# Patient Record
Sex: FEMALE | Race: WHITE | Employment: OTHER | URBAN - METROPOLITAN AREA
[De-identification: names, ages, dates, MRNs, and addresses within clinical notes are randomized per-mention and may not be internally consistent; named-entity substitution may affect disease eponyms.]

---

## 2017-03-03 ENCOUNTER — TELEPHONE (OUTPATIENT)
Dept: NEUROLOGY | Facility: CLINIC | Age: 62
End: 2017-03-03

## 2017-03-03 NOTE — TELEPHONE ENCOUNTER
Attending physician's statement and pertinent clinic visit notes faxed to [employer] at [phone].

## 2017-03-12 ENCOUNTER — MEDICAL CORRESPONDENCE (OUTPATIENT)
Dept: HEALTH INFORMATION MANAGEMENT | Facility: CLINIC | Age: 62
End: 2017-03-12

## 2017-03-27 ASSESSMENT — ENCOUNTER SYMPTOMS
BACK PAIN: 1
WEIGHT GAIN: 0
STIFFNESS: 1
DECREASED CONCENTRATION: 1
DOUBLE VISION: 0
FEVER: 0
SORE THROAT: 0
FLANK PAIN: 0
LOSS OF CONSCIOUSNESS: 0
TREMORS: 1
SEIZURES: 0
TASTE DISTURBANCE: 0
DISTURBANCES IN COORDINATION: 1
CHILLS: 0
COUGH DISTURBING SLEEP: 0
WEAKNESS: 1
RESPIRATORY PAIN: 0
SMELL DISTURBANCE: 0
SINUS PAIN: 0
NECK PAIN: 1
BRUISES/BLEEDS EASILY: 1
SPUTUM PRODUCTION: 0
DECREASED APPETITE: 0
MYALGIAS: 1
INSOMNIA: 1
DYSPNEA ON EXERTION: 1
POLYPHAGIA: 0
MUSCLE CRAMPS: 0
HEMOPTYSIS: 0
SWOLLEN GLANDS: 0
NERVOUS/ANXIOUS: 1
FATIGUE: 1
HEADACHES: 0
NIGHT SWEATS: 1
SINUS CONGESTION: 0
EYE IRRITATION: 1
SHORTNESS OF BREATH: 1
SPEECH CHANGE: 1
WEIGHT LOSS: 0
POSTURAL DYSPNEA: 1
DEPRESSION: 1
MEMORY LOSS: 1
POLYDIPSIA: 0
JOINT SWELLING: 0
EYE PAIN: 0
HEMATURIA: 0
ARTHRALGIAS: 1
TINGLING: 0
DIFFICULTY URINATING: 0
WHEEZING: 0
DIZZINESS: 1
NECK MASS: 0
NUMBNESS: 1
DYSURIA: 0
MUSCLE WEAKNESS: 1
INCREASED ENERGY: 0
HOARSE VOICE: 1
ALTERED TEMPERATURE REGULATION: 1
EYE WATERING: 0
SNORES LOUDLY: 0
PARALYSIS: 0
EYE REDNESS: 0
TROUBLE SWALLOWING: 0
COUGH: 0
PANIC: 0
HALLUCINATIONS: 0

## 2017-04-03 ENCOUNTER — OFFICE VISIT (OUTPATIENT)
Dept: NEUROLOGY | Facility: CLINIC | Age: 62
End: 2017-04-03

## 2017-04-03 VITALS — DIASTOLIC BLOOD PRESSURE: 89 MMHG | HEIGHT: 65 IN | SYSTOLIC BLOOD PRESSURE: 147 MMHG | HEART RATE: 79 BPM

## 2017-04-03 DIAGNOSIS — R25.3 FASCICULATION: ICD-10-CM

## 2017-04-03 DIAGNOSIS — F41.9 ANXIETY: ICD-10-CM

## 2017-04-03 DIAGNOSIS — G70.00 MYASTHENIA GRAVIS WITHOUT EXACERBATION (H): Primary | ICD-10-CM

## 2017-04-03 DIAGNOSIS — G70.00 MYASTHENIA GRAVIS WITHOUT EXACERBATION (H): ICD-10-CM

## 2017-04-03 LAB
EXPTIME-PRE: 6.14 SEC
FEF2575-%PRED-PRE: 91 %
FEF2575-PRE: 1.96 L/SEC
FEF2575-PRED: 2.13 L/SEC
FEFMAX-%PRED-PRE: 87 %
FEFMAX-PRE: 5.38 L/SEC
FEFMAX-PRED: 6.12 L/SEC
FEV1-%PRED-PRE: 67 %
FEV1-PRE: 1.63 L
FEV1FEV6-PRE: 88 %
FEV1FEV6-PRED: 80 %
FEV1FVC-PRE: 88 %
FEV1FVC-PRED: 79 %
FIFMAX-PRE: 3.21 L/SEC
FVC-%PRED-PRE: 59 %
FVC-PRE: 1.84 L
FVC-PRED: 3.08 L
MEP-PRE: 90 CMH2O
MIP-PRE: -80 CMH2O

## 2017-04-03 RX ORDER — CLONAZEPAM 1 MG/1
TABLET ORAL
Qty: 60 TABLET | Refills: 3 | Status: SHIPPED | OUTPATIENT
Start: 2017-04-03 | End: 2017-08-10

## 2017-04-03 RX ORDER — PREDNISONE 5 MG/1
15 TABLET ORAL DAILY
Qty: 90 TABLET | Refills: 3 | Status: SHIPPED | OUTPATIENT
Start: 2017-04-03 | End: 2017-09-18

## 2017-04-03 RX ORDER — CITALOPRAM HYDROBROMIDE 20 MG/1
20 TABLET ORAL DAILY
Qty: 30 TABLET | Refills: 5 | Status: SHIPPED | OUTPATIENT
Start: 2017-04-03 | End: 2018-02-10

## 2017-04-03 RX ORDER — CARBAMAZEPINE 200 MG/1
200 CAPSULE, EXTENDED RELEASE ORAL 2 TIMES DAILY
Qty: 120 CAPSULE | Refills: 5 | Status: SHIPPED | OUTPATIENT
Start: 2017-04-03 | End: 2018-07-15

## 2017-04-03 ASSESSMENT — PAIN SCALES - GENERAL: PAINLEVEL: NO PAIN (0)

## 2017-04-03 NOTE — PATIENT INSTRUCTIONS
If you have questions or concerns following today's appointment, please contact   Kendra Beckwith at [phone].    For more urgent concerns, contact the neurology department triage line at [phone] option 3.    Follow up with Dr. Vitale in 6 months.     We now have a  available to help patients with psychosocial needs, supportive counseling, advanced care planning, and insurance and/or disability questions. You can reach SEAN Zhang, [facility] at [phone].

## 2017-04-03 NOTE — LETTER
4/3/2017       RE: Martina Kim  [address] [facility] [address]     Dear Colleague,    Thank you for referring your patient, Martina Kim, to the [facility] NEUROLOGY at [facility]. Please see a copy of my visit note below.    April 3, 2017      Ivan Marie MD   [address]   [place] [address]      RE: Martina Kim   MRN: [id number]    : 1955        Dear Dr. Marie:      I had the pleasure of seeing your patient, Martina Kim at the [facility] on 2017.  Since her previous visit in November, she has done fairly well with no worsening of her symptoms.  In fact, she is feeling better and reports fairly good quality of life.  She is more active and able to walk independently for some distance.  She does take breaks and naps when she requires.  She denies any bulbar symptoms, significant double vision or respiratory difficulties.  She continues to be on prednisone which was reduced down to 15 mg daily.  She continues on Hizentra weekly.  Her last infusion of intravenous immunoglobulin was a few weeks ago.  She continues to have some fasciculations for which she is on carbamazepine.  She also reports on some vibration sensation in her thighs which last for a few seconds.  Her anxiety is improved on Celexa.      REVIEW OF SYSTEMS:  The reminder of 10-point review of systems was negative except as mentioned above.      CURRENT MEDICATIONS:  Reviewed and as in the EMR.      PHYSICAL EXAMINATION:   VITAL SIGNS:  Her blood pressure was 147/89.  Pulse 79.   GENERAL:   She appeared to be at her usual state.  She was awake, alert.   Sclera and conjunctiva clear.  Oropharynx was moist.  Language and speech were normal.  She had no ptosis and has good facial strength.  The reminder of the examination showed good strength in the proximal and distal muscles in the upper  extremities and mild weakness in hip flexion bilaterally.      IMPRESSION:  Ms. 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  At this point, she has been stable and will continue with her current course as it is.  She will continue with prednisone and gradual very slow taper.  She will continue with Hizentra at this point.  She will return to our clinic in 6 months or sooner if necessary.      Thank you for allowing me to be involved in her care.      Sincerely,       Frederick Vitale MD      In all, I spent at least 15 minutes more than half of overall time in counseling and coordination of care.            D: 2017 17:14   T: 2017 11:30   MT: AKA      Name:     IVY PETERSON   MRN:      [id number]        Account:      [id number]   :      1955           Service Date: 2017      Document: [id number]

## 2017-04-03 NOTE — MR AVS SNAPSHOT
After Visit Summary   4/3/2017    Martina Kim    MRN: [id number]           Patient Information     Date Of Birth          1955        Visit Information        Provider Department      4/3/2017 4:30 PM Frederick Vitale MD [facility] Neurology        Today's Diagnoses     Myasthenia gravis without exacerbation (H)    -  1    Anxiety        Fasciculation          Care Instructions    If you have questions or concerns following today's appointment, please contact   Kendra Beckwith at [phone].    For more urgent concerns, contact the neurology department triage line at [phone] option 3.    Follow up with Dr. Vitale in 6 months.     We now have a  available to help patients with psychosocial needs, supportive counseling, advanced care planning, and insurance and/or disability questions. You can reach SEAN Zhang, [facility] at [phone].            Follow-ups after your visit        Follow-up notes from your care team     Return in about 6 months (around 10/3/2017).      Who to contact     Please call your clinic at [phone] to:    Ask questions about your health    Make or cancel appointments    Discuss your medicines    Learn about your test results    Speak to your doctor   If you have compliments or concerns about an experience at your clinic, or if you wish to file a complaint, please contact [facility] Patient Relations at [phone] or email us at [email or web address]         Additional Information About Your Visit        MyChart Information     [employer] gives you secure access to your electronic health record. If you see a primary care provider, you can also send messages to your care team and make appointments. If you have questions, please call your primary care clinic.  If you do not have a primary care provider, please call [phone] and they will assist you.      [employer] is an electronic gateway  "that provides easy, online access to your medical records. With [employer], you can request a clinic appointment, read your test results, renew a prescription or communicate with your care team.     To access your existing account, please contact your [facility] or call [phone] for assistance.        Care EveryWhere ID     This is your Care EveryWhere ID. This could be used by other organizations to access your [facility] medical records  [id number]        Your Vitals Were     Pulse Height                79 5' 4.5\" (163.8 cm)           Blood Pressure from Last 3 Encounters:   04/03/17 147/89   11/28/16 140/76   10/27/16 125/75    Weight from Last 3 Encounters:   10/27/16 170 lb 11.2 oz (77.4 kg)   10/25/16 170 lb (77.1 kg)   08/29/16 178 lb (80.7 kg)                 Today's Medication Changes          These changes are accurate as of: 4/3/17 11:59 PM.  If you have any questions, ask your nurse or doctor.               These medicines have changed or have updated prescriptions.        Dose/Directions    citalopram 20 MG tablet   Commonly known as:  celeXA   This may have changed:    - how much to take  - how to take this  - when to take this  - additional instructions   Used for:  Myasthenia gravis without exacerbation (H)   Changed by:  Frederick Vitale MD        Dose:  20 mg   Take 1 tablet (20 mg) by mouth daily   Quantity:  30 tablet   Refills:  5       predniSONE 5 MG tablet   Commonly known as:  DELTASONE   This may have changed:    - how much to take  - how to take this  - when to take this   Used for:  Myasthenia gravis without exacerbation (H)   Changed by:  Frederick Vitale MD        Dose:  15 mg   Take 3 tablets (15 mg) by mouth daily Start 3.5 tab (17.5mg) x 4 weeks then 3tab (15 mg) daily   Quantity:  90 tablet   Refills:  3            Where to get your medicines      These medications were sent to [id number] - [place] - [address] [facility] [address]     Phone:  [phone]     carBAMazepine 200 MG 12 hr capsule    citalopram 20 MG tablet    predniSONE 5 MG tablet         Some of these will need a paper prescription and others can be bought over the counter.  Ask your nurse if you have questions.     Bring a paper prescription for each of these medications     clonazePAM 1 MG tablet                Primary Care Provider Office Phone # Fax #    Ivan Marie [phone]       [place] [address]  [place] [address]        Thank you!     Thank you for choosing [facility] NEUROLOGY  for your care. Our goal is always to provide you with excellent care. Hearing back from our patients is one way we can continue to improve our services. Please take a few minutes to complete the written survey that you may receive in the mail after your visit with us. Thank you!             Your Updated Medication List - Protect others around you: Learn how to safely use, store and throw away your medicines at www.disposemymeds.org.          This list is accurate as of: 4/3/17 11:59 PM.  Always use your most recent med list.                   Brand Name Dispense Instructions for use    albuterol 108 (90 BASE) MCG/ACT Inhaler    PROAIR HFA/PROVENTIL HFA/VENTOLIN HFA    1 Inhaler    Inhale 2 puffs into the lungs every 6 hours as needed for shortness of breath / dyspnea or wheezing       amLODIPine-benazepril 2.5-10 MG per capsule    LOTREL    90 capsule    Take 1 capsule by mouth daily       aspirin 81 MG tablet      Take  by mouth daily. 1 Tablet Daily       calcium carbonate 500 MG tablet    OS-LINDEN 500 mg [place]. Ca     Take 500 mg by mouth 2 times daily       carBAMazepine 200 MG 12 hr capsule    CARBATROL    120 capsule    Take 1 capsule (200 mg) by mouth 2 times daily       citalopram 20 MG tablet    celeXA    30 tablet    Take 1 tablet (20 mg) by mouth daily       clonazePAM 1 MG tablet    klonoPIN    60 tablet     Take 1-2 tablets at bedtime as needed for sleep       diphenhydrAMINE 50 MG capsule    BENADRYL     Take 50 mg by mouth. Every 4 hours during infusion.       DOXYCYCLINE CALCIUM PO      Take 100 mg by mouth       estradiol 0.5 MG tablet    ESTRACE    42 tablet    Take 1 tablet (0.5 mg) by mouth daily 1 Tablet Daily       fluticasone-salmeterol 500-50 MCG/DOSE diskus inhaler    ADVAIR    1 Inhaler    Inhale 1 puff into the lungs 2 times daily       FOSAMAX PO      Take 70 mg by mouth once a week       GAMMAKED IJ          IBUPROFEN PO      Take by mouth as needed       Immune globulin 2 GM/10ML Soln    HIZENTRA    65 mL    Inject 65 mLs (13 g) Subcutaneous once       LUMIGAN 0.01 % Soln   Generic drug:  bimatoprost      Place 1 drop into both eyes daily.       order for [facility]      [employer] Dream Station AutoBiPAP 11/6 cm, ComfortGel Blue Pte nasal mask.       predniSONE 5 MG tablet    DELTASONE    90 tablet    Take 3 tablets (15 mg) by mouth daily Start 3.5 tab (17.5mg) x 4 weeks then 3tab (15 mg) daily       PREVACID 30 MG CR capsule   Generic drug:  LANsoprazole      Take  by mouth daily. 1 Tablet Daily       SOLU-MEDROL 125 mg/2 mL injection   Generic drug:  methylPREDNISolone sodium succinate      Inject 125 mg into the vein once       spironolactone 100 MG tablet    ALDACTONE    30 tablet    Take 1 tablet (100 mg) by mouth daily Before infusion       UNKNOWN TO PATIENT      Eye drop- prednisone drop       VITAMIN A PO      Take  by mouth. 1 Tablet Daily       VITAMIN C PO      Take 1,000 mg by mouth daily       VITAMIN D PO      Take  by mouth. 1 Tablet Daily       VITAMIN E      1 Tablet Daily

## 2017-04-04 NOTE — PROGRESS NOTES
April 3, 2017      Ivan Marie MD   [facility]    [address]   [place] [address]      RE: Martina Kim   MRN: [id number]    : 1955        Dear Dr. Marie:      I had the pleasure of seeing your patient, Martina Kim at the [facility] on 2017.  Since her previous visit in November, she has done fairly well with no worsening of her symptoms.  In fact, she is feeling better and reports fairly good quality of life.  She is more active and able to walk independently for some distance.  She does take breaks and naps when she requires.  She denies any bulbar symptoms, significant double vision or respiratory difficulties.  She continues to be on prednisone which was reduced down to 15 mg daily.  She continues on Hizentra weekly.  Her last infusion of intravenous immunoglobulin was a few weeks ago.  She continues to have some fasciculations for which she is on carbamazepine.  She also reports on some vibration sensation in her thighs which last for a few seconds.  Her anxiety is improved on Celexa.      REVIEW OF SYSTEMS:  The reminder of 10-point review of systems was negative except as mentioned above.      CURRENT MEDICATIONS:  Reviewed and as in the EMR.      PHYSICAL EXAMINATION:   VITAL SIGNS:  Her blood pressure was 147/89.  Pulse 79.   GENERAL:   She appeared to be at her usual state.  She was awake, alert.   Sclera and conjunctiva clear.  Oropharynx was moist.  Language and speech were normal.  She had no ptosis and has good facial strength.  The reminder of the examination showed good strength in the proximal and distal muscles in the upper extremities and mild weakness in hip flexion bilaterally.      IMPRESSION:  Ms. 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  At this point, she has been stable and will  continue with her current course as it is.  She will continue with prednisone and gradual very slow taper.  She will continue with Hizentra at this point.  She will return to our clinic in 6 months or sooner if necessary.      Thank you for allowing me to be involved in her care.      Sincerely,       Frederick Vitale MD      In all, I spent at least 15 minutes more than half of overall time in counseling and coordination of care.            D: 2017 17:14   T: 2017 11:30   MT: AKA      Name:     IVY PETERSON   MRN:      [id number]        Account:      [id number]   :      1955           Service Date: 2017      Document: [id number]

## 2017-05-15 ENCOUNTER — HOSPITAL ENCOUNTER (EMERGENCY)
Facility: CLINIC | Age: 62
Discharge: HOME | End: 2017-05-15
Attending: PHYSICIAN ASSISTANT | Admitting: PHYSICIAN ASSISTANT
Payer: COMMERCIAL

## 2017-05-15 ENCOUNTER — APPOINTMENT (OUTPATIENT)
Dept: GENERAL RADIOLOGY | Facility: CLINIC | Age: 62
End: 2017-05-15
Attending: PHYSICIAN ASSISTANT
Payer: COMMERCIAL

## 2017-05-15 VITALS
HEART RATE: 76 BPM | HEIGHT: 65 IN | OXYGEN SATURATION: 97 % | DIASTOLIC BLOOD PRESSURE: 90 MMHG | BODY MASS INDEX: 28.32 KG/M2 | SYSTOLIC BLOOD PRESSURE: 163 MMHG | WEIGHT: 170 LBS | RESPIRATION RATE: 18 BRPM | TEMPERATURE: 97.9 F

## 2017-05-15 DIAGNOSIS — M79.671 RIGHT FOOT PAIN: Primary | ICD-10-CM

## 2017-05-15 PROCEDURE — 99213 OFFICE O/P EST LOW 20 MIN: CPT | Performed by: PHYSICIAN ASSISTANT

## 2017-05-15 PROCEDURE — 99213 OFFICE O/P EST LOW 20 MIN: CPT

## 2017-05-15 PROCEDURE — 73630 X-RAY EXAM OF FOOT: CPT | Mod: RT

## 2017-05-15 ASSESSMENT — ENCOUNTER SYMPTOMS
NEUROLOGICAL NEGATIVE: 1
CONSTITUTIONAL NEGATIVE: 1

## 2017-05-15 NOTE — ED AVS SNAPSHOT
[facility] Emergency Department    [address] [address]    Phone:  [phone]    Fax:  [phone]                                       Martina Kim   MRN: [id number]    Department:  [facility] Emergency Department   Date of Visit:  5/15/2017           Patient Information     Date Of Birth          1955        Your diagnoses for this visit were:     Right foot pain        You were seen by Emma Smith PA-C.      Follow-up Information     Call Orthopedics.    Why:  For follow-up        Follow up with [facility] Emergency Department.    Specialty:  EMERGENCY MEDICINE    Why:  As needed, If symptoms worsen    Contact information:    [address] [place] [address] [phone]    Additional information:    The medical center is located at   [address] ([address] [place], [place] miles north   of [place]).      Discharge References/Attachments     FRACTURE, STRESS (ENGLISH)      24 Hour Appointment Hotline       To make an appointment at any [place] clinic, call [phone] ([phone]). If you don't have a family doctor or clinic, we will help you find one. [place] clinics are conveniently located to serve the needs of you and your family.          ED Discharge Orders     Cam Walker Adj Ankle           ORTHO  REFERRAL       [facility] is referring you to the Orthopedic  Services at [facility].       The  Representative will assist you in the coordination of your Orthopedic and Musculoskeletal Care as prescribed by your physician.    The  Representative will call you within 1 business day to help schedule your appointment, or you may contact the  Representative at:    All areas ~ [phone]     Type of Referral : Non Surgical       Timeframe requested: Within 1-2 weeks    Coverage of these services is subject to the  terms and limitations of your health insurance plan.  Please call member services at your health plan with any benefit or coverage questions.      If X-rays, CT or MRI's have been performed, please contact the facility where they were done to arrange for , prior to your scheduled appointment.  Please bring this referral request to your appointment and present it to your specialist.                     Review of your medicines      Our records show that you are taking the medicines listed below. If these are incorrect, please call your family doctor or clinic.        Dose / Directions Last dose taken    albuterol 108 (90 BASE) MCG/ACT Inhaler   Commonly known as:  PROAIR HFA/PROVENTIL HFA/VENTOLIN HFA   Dose:  2 puff   Quantity:  1 Inhaler        Inhale 2 puffs into the lungs every 6 hours as needed for shortness of breath / dyspnea or wheezing   Refills:  11        amLODIPine-benazepril 2.5-10 MG per capsule   Commonly known as:  LOTREL   Dose:  1 capsule   Quantity:  90 capsule        Take 1 capsule by mouth daily   Refills:  3        aspirin 81 MG tablet        Take  by mouth daily. 1 Tablet Daily   Refills:  0        calcium carbonate 500 MG tablet   Commonly known as:  OS-LINDEN 500 mg [place]. Ca   Dose:  500 mg        Take 500 mg by mouth 2 times daily   Refills:  0        carBAMazepine 200 MG 12 hr capsule   Commonly known as:  CARBATROL   Dose:  200 mg   Quantity:  120 capsule        Take 1 capsule (200 mg) by mouth 2 times daily   Refills:  5        citalopram 20 MG tablet   Commonly known as:  celeXA   Dose:  20 mg   Quantity:  30 tablet        Take 1 tablet (20 mg) by mouth daily   Refills:  5        clonazePAM 1 MG tablet   Commonly known as:  klonoPIN   Quantity:  60 tablet        Take 1-2 tablets at bedtime as needed for sleep   Refills:  3        diphenhydrAMINE 50 MG capsule   Commonly known as:  BENADRYL   Dose:  50 mg        Take 50 mg by mouth. Every 4 hours during infusion.   Refills:  0         DOXYCYCLINE CALCIUM PO   Dose:  100 mg        Take 100 mg by mouth   Refills:  0        estradiol 0.5 MG tablet   Commonly known as:  ESTRACE   Dose:  0.5 mg   Quantity:  42 tablet        Take 1 tablet (0.5 mg) by mouth daily 1 Tablet Daily   Refills:  3        fluticasone-salmeterol 500-50 MCG/DOSE diskus inhaler   Commonly known as:  ADVAIR   Dose:  1 puff   Quantity:  1 Inhaler        Inhale 1 puff into the lungs 2 times daily   Refills:  11        FOSAMAX PO   Dose:  70 mg        Take 70 mg by mouth once a week   Refills:  0        GAMMAKED IJ        Refills:  0        IBUPROFEN PO        Take by mouth as needed   Refills:  0        Immune globulin 2 GM/10ML Soln   Commonly known as:  HIZENTRA   Dose:  13 g   Quantity:  65 mL        Inject 65 mLs (13 g) Subcutaneous once   Refills:  0        LUMIGAN 0.01 % Soln   Dose:  1 drop   Generic drug:  bimatoprost        Place 1 drop into both eyes daily.   Refills:  0        order for [facility]        [employer] Dream Station AutoBiPAP 11/6 cm, ComfortGel Blue Pte nasal mask.   Refills:  0        predniSONE 5 MG tablet   Commonly known as:  DELTASONE   Dose:  15 mg   Quantity:  90 tablet        Take 3 tablets (15 mg) by mouth daily Start 3.5 tab (17.5mg) x 4 weeks then 3tab (15 mg) daily   Refills:  3        PREVACID 30 MG CR capsule   Generic drug:  LANsoprazole        Take  by mouth daily. 1 Tablet Daily   Refills:  0        SOLU-MEDROL 125 mg/2 mL injection   Dose:  125 mg   Generic drug:  methylPREDNISolone sodium succinate        Inject 125 mg into the vein once   Refills:  0        spironolactone 100 MG tablet   Commonly known as:  ALDACTONE   Dose:  100 mg   Quantity:  30 tablet        Take 1 tablet (100 mg) by mouth daily Before infusion   Refills:  3        UNKNOWN TO PATIENT        Eye drop- prednisone drop   Refills:  0        VITAMIN A PO        Take  by mouth. 1 Tablet Daily   Refills:  0        VITAMIN C PO   Dose:  1000 mg        Take 1,000 mg by mouth daily    Refills:  0        VITAMIN D PO        Take  by mouth. 1 Tablet Daily   Refills:  0        VITAMIN E        1 Tablet Daily   Refills:  0                Procedures and tests performed during your visit     Foot  XR, G/E 3 views, right      Orders Needing Specimen Collection     None      Pending Results     Date and Time Order Name Status Description    5/15/2017 1734 Foot  XR, G/E 3 views, right Preliminary             Pending Culture Results     No orders found from 5/13/2017 to 5/16/2017.            Pending Results Instructions     If you had any lab results that were not finalized at the time of your Discharge, you can call the ED Lab Result RN at [phone]. You will be contacted by this team for any positive Lab results or changes in treatment. The nurses are available 7 days a week from 10A to 6:30P.  You can leave a message 24 hours per day and they will return your call.        Test Results From Your Hospital Stay        5/15/2017  5:57 PM      Narrative     RIGHT FOOT THREE OR MORE VIEWS   5/15/2017 5:48 PM     HISTORY: Tenderness to distal great toe metatarsal.    COMPARISON: None.        Impression     IMPRESSION: Normal.                 Thank you for choosing [facility]       Thank you for choosing [facility] for your care. Our goal is always to provide you with excellent care. Hearing back from our patients is one way we can continue to improve our services. Please take a few minutes to complete the written survey that you may receive in the mail after you visit with us. Thank you!        [employer] Information     [employer] gives you secure access to your electronic health record. If you see a primary care provider, you can also send messages to your care team and make appointments. If you have questions, please call your primary care clinic.  If you do not have a primary care provider, please call [phone] and they will assist you.        Care EveryWhere ID     This is your Care EveryWhere ID. This  could be used by other organizations to access your [facility] medical records  [id number]        After Visit Summary       This is your record. Keep this with you and show to your community pharmacist(s) and doctor(s) at your next visit.

## 2017-05-15 NOTE — ED AVS SNAPSHOT
[facility] Emergency Department    [address] [address]    Phone:  [phone]    Fax:  [phone]                                       Martina Kim   MRN: [id number]    Department:  [facility] Emergency Department   Date of Visit:  5/15/2017           After Visit Summary Signature Page     I have received my discharge instructions, and my questions have been answered. I have discussed any challenges I see with this plan with the nurse or doctor.    ..........................................................................................................................................  Patient/Patient Representative Signature      ..........................................................................................................................................  Patient Representative Print Name and Relationship to Patient    ..................................................               ................................................  Date                                            Time    ..........................................................................................................................................  Reviewed by Signature/Title    ...................................................              ..............................................  Date                                                            Time

## 2017-05-15 NOTE — ED PROVIDER NOTES
"  History     Chief Complaint   Patient presents with     Foot Pain     R foot pain x 3 days after standing on a shovel trying to get it throuh a bush.  pt is on chronic steroids dt myastina gravis     HPI  Martina Kim is a 61 year old female with hx myasthenia gravis, HTN who presents with complaints of right foot pain x 3 days.  States she was standing and pushing on a shovel several times while she was dividing a hydrangea plant and developed pain to the plantar aspect of her foot at that time, 3 days ago.  She reports worsening foot pain since that time.  The pain has now localized to the dorsum of her foot along her distal great toe metatarsal region.  Since this morning she has been unable to put weight on her foot due to the pain.  Pt denies prior injury to this foot.  She does note that she is on chronic steroids for her myasthenia gravis.  She did develop ecchymosis to the plantar aspect of her foot.    I have reviewed the Medications, Allergies, Past Medical and Surgical History, and Social History in the Epic system.    Review of Systems   Constitutional: Negative.    Musculoskeletal:        Right foot pain   Skin: Negative.    Neurological: Negative.    All other systems reviewed and are negative.      Physical Exam   BP: 163/90  Pulse: 76  Temp: 97.9  F (36.6  C)  Resp: 18  Height: 163.8 cm (5' 4.5\")  Weight: 77.1 kg (170 lb)  SpO2: 97 %  Physical Exam   Constitutional: She appears well-developed and well-nourished. No distress.   HENT:   Head: Normocephalic and atraumatic.   Cardiovascular: Intact distal pulses.    Musculoskeletal:        Right ankle: Normal.        Right foot: There is bony tenderness (point tenderness to distal first metatarsal) and swelling. There is normal range of motion, normal capillary refill, no crepitus, no deformity and no laceration.        Feet:    Ecchymosis to plantar aspect of right foot along distal metatarsals   Neurological: She is alert. She has normal " strength. No sensory deficit.   Skin: Skin is warm and dry.       ED Course     ED Course     Procedures    Results for orders placed or performed during the hospital encounter of 05/15/17   Foot  XR, G/E 3 views, right    Narrative    RIGHT FOOT THREE OR MORE VIEWS   5/15/2017 5:48 PM     HISTORY: Tenderness to distal great toe metatarsal.    COMPARISON: None.      Impression    IMPRESSION: Normal.     GERI RUCKER MD       Assessments & Plan (with Medical Decision Making)     Pt is a 61 year old female with hx myasthenia gravis, HTN who presents with complaints of right foot pain x 3 days.  States she was standing and pushing on a shovel several times while she was dividing a hydrangea plant and developed pain to the plantar aspect of her foot at that time, 3 days ago.  She reports worsening foot pain since that time.  The pain has now localized to the dorsum of her foot along her distal great toe metatarsal region.  Since this morning she has been unable to put weight on her foot due to the pain.  Pt denies prior injury to this foot.  She does note that she is on chronic steroids for her myasthenia gravis.  She did develop ecchymosis to the plantar aspect of her foot.  Pt is afebrile on arrival.  Exam as above.  X-rays of right foot were negative for fracture or acute pathology.  Discussed results with patient.  Concern for possible stress fracture given pt's point tenderness and inability to weight-bear; she is also at risk given her chronic steroid use.  Pt was therefore placed in a cam boot.  She has a cane to help with ambulation as well.  Hand-outs provided.    Patient was instructed to follow-up in the orthopedic clinic in 5-7 days for repeat imaging evaluating for stress fracture and for continued care and management.  She is to return to the ED for persistent and/or worsening symptoms.  Patient expressed understanding of the diagnosis and plan and was discharged home in good condition.    I have reviewed  the nursing notes.    I have reviewed the findings, diagnosis, plan and need for follow up with the patient.    Discharge Medication List as of 5/15/2017  6:17 PM          Final diagnoses:   Right foot pain       5/15/2017   [facility] EMERGENCY DEPARTMENT     Emma Smith PA-C  05/15/17 2042

## 2017-05-18 ENCOUNTER — TELEPHONE (OUTPATIENT)
Dept: NEUROLOGY | Facility: CLINIC | Age: 62
End: 2017-05-18

## 2017-05-18 RX ORDER — SPIRONOLACTONE 100 MG/1
100 TABLET, FILM COATED ORAL DAILY
Status: CANCELLED
Start: 2017-05-18

## 2017-05-18 RX ORDER — ACETAMINOPHEN 325 MG/1
650 TABLET ORAL ONCE
Status: CANCELLED
Start: 2017-05-18 | End: 2017-05-18

## 2017-05-18 RX ORDER — DIPHENHYDRAMINE HCL 25 MG
50 CAPSULE ORAL EVERY 6 HOURS PRN
Status: CANCELLED | OUTPATIENT
Start: 2017-05-18

## 2017-05-18 NOTE — TELEPHONE ENCOUNTER
Patient c/o worsening sx MG, and increased fasiculations in legs and arms, and some facial twitching.     Drooping of one or both eyelids: Yes, left eye has worsened  Double vision (diplopia): No    Altered speaking (i.e. Soft or nasal): No  Difficulty swallowing (i.e. Choking easily, liquids coming out of nose): No  Problems chewing (i.e. Muscles wear out detention through a meal, weight loss): No  Limited facial expressions: No, but some facial drooping    Weakness: YES If so, where (i.e. Neck and arms more often than legs): Trunk and neck    Lifestyle factors (i.e. Fatigue, illness, stress): Stress fracture recently at foot. Patient worries that she might have a UTI, and will see her PCP ASAP to address this.   Any new meds (i.e. Beta blocker, quinidine gluconate, quinidine sulfate, quinine (Qualaquin), phenytoin (Dilantin), certain anesthetics, and some antibiotics): No, but does do occasional doxycycline courses for 100mg daily for 10 days for optical rosacea and recently completed a course    Medications for MG (i.e. choinesterase inhibitor (pyridostigmine-Mestinon), corticosteroids (Prednisone), immunosuppressants (azathioprine-Imuran or mycophenolate mofetil-CellCept): SubQ Hyzentra every week, and Prednisone 17.5mg daily. No Mestinon currently, but wonders if this should be reintroduced.     Therapy for MG (i.e. Plasmapheresis, IVIg, Rituximab): Has had IVIG in the past, and wonders if she can do another round of this. If so, will need Spirinolactone prior to treatment.     Also, needs follow up appointment set up.

## 2017-05-18 NOTE — TELEPHONE ENCOUNTER
Per Dr. Vitale, will prescribe IVIG at same dose and frequency as last round.    Patient informed and agrees to this plan. Order updated and faxed to [facility] at [phone].

## 2017-05-19 ENCOUNTER — TELEPHONE (OUTPATIENT)
Dept: NEUROLOGY | Facility: CLINIC | Age: 62
End: 2017-05-19

## 2017-05-19 DIAGNOSIS — G70.01 MG WITH EXACERBATION (MYASTHENIA GRAVIS) (H): ICD-10-CM

## 2017-05-19 RX ORDER — SPIRONOLACTONE 100 MG/1
100 TABLET, FILM COATED ORAL DAILY
Qty: 5 TABLET | Refills: 3 | Status: SHIPPED | OUTPATIENT
Start: 2017-05-19 | End: 2017-11-27

## 2017-05-19 NOTE — TELEPHONE ENCOUNTER
Caller name:  Lima [facility] [phone]  IVIG is ordered and they do not have Gammaked, the immune globulin used before for patient. Lima states that Privigen and Gammunex C are substitutes they can offer.  Writer return call to Lima and left message stating that either would be acceptable substitutes and our preference would be for Gammunex C.  She is encouraged to call back with any questions or concerns.

## 2017-06-12 ENCOUNTER — MEDICAL CORRESPONDENCE (OUTPATIENT)
Dept: HEALTH INFORMATION MANAGEMENT | Facility: CLINIC | Age: 62
End: 2017-06-12

## 2017-07-03 ENCOUNTER — TELEPHONE (OUTPATIENT)
Dept: NEUROLOGY | Facility: CLINIC | Age: 62
End: 2017-07-03

## 2017-07-03 ENCOUNTER — MEDICAL CORRESPONDENCE (OUTPATIENT)
Dept: HEALTH INFORMATION MANAGEMENT | Facility: CLINIC | Age: 62
End: 2017-07-03

## 2017-07-03 NOTE — TELEPHONE ENCOUNTER
Prescription for Hizentra signed, [facility]ed [facility] at [phone], and scanned into EPIC.

## 2017-07-08 ENCOUNTER — HEALTH MAINTENANCE LETTER (OUTPATIENT)
Age: 62
End: 2017-07-08

## 2017-07-17 ENCOUNTER — TELEPHONE (OUTPATIENT)
Dept: NEUROLOGY | Facility: CLINIC | Age: 62
End: 2017-07-17

## 2017-08-10 DIAGNOSIS — G70.00 MYASTHENIA GRAVIS WITHOUT EXACERBATION (H): ICD-10-CM

## 2017-08-10 RX ORDER — CLONAZEPAM 1 MG/1
TABLET ORAL
Qty: 60 TABLET | Refills: 3 | Status: SHIPPED | OUTPATIENT
Start: 2017-08-10 | End: 2018-05-03

## 2017-09-12 ENCOUNTER — MEDICAL CORRESPONDENCE (OUTPATIENT)
Dept: HEALTH INFORMATION MANAGEMENT | Facility: CLINIC | Age: 62
End: 2017-09-12

## 2017-09-18 ENCOUNTER — TELEPHONE (OUTPATIENT)
Dept: NEUROLOGY | Facility: CLINIC | Age: 62
End: 2017-09-18

## 2017-09-18 DIAGNOSIS — G70.00 MYASTHENIA GRAVIS WITHOUT EXACERBATION (H): ICD-10-CM

## 2017-09-18 RX ORDER — PREDNISONE 5 MG/1
TABLET ORAL
Qty: 90 TABLET | Refills: 1 | Status: CANCELLED | OUTPATIENT
Start: 2017-09-18

## 2017-09-18 NOTE — TELEPHONE ENCOUNTER
Patient states that, when she attempted to make recommended reduction of Prednisone from 17.5mg to 15mg PO daily, fatigue and other MG symptoms returned. She has remained at 17.5mg and wishes to do so, as she feels stable at this dose.     She has been experiencing hot flashes for about 2 months, several times a day, that cause her to sweat profusely. She cannot think of any changes associated with this symptom. She saw her PCP related to this concern, who did many labs including thyroid, and everything was within normal limits. She would like to run this symptom by Dr. Vitale and ask whether he thinks this might be related to prednisone or any other treatment or concern related to MG.    Dr. Vitale will contact patient directly related to symptoms outlined above.

## 2017-09-19 RX ORDER — PREDNISONE 5 MG/1
17.5 TABLET ORAL DAILY
Qty: 105 TABLET | Refills: 3 | Status: SHIPPED | OUTPATIENT
Start: 2017-09-19 | End: 2018-01-23

## 2017-10-10 DIAGNOSIS — G70.00 MYASTHENIA GRAVIS WITHOUT EXACERBATION (H): ICD-10-CM

## 2017-10-10 DIAGNOSIS — R25.3 FASCICULATION: ICD-10-CM

## 2017-10-12 RX ORDER — CYCLOBENZAPRINE HCL 10 MG
TABLET ORAL
Qty: 30 TABLET | Refills: 1 | Status: SHIPPED | OUTPATIENT
Start: 2017-10-12 | End: 2017-12-06

## 2017-10-12 NOTE — TELEPHONE ENCOUNTER
Received refill request for cyclobenzaprine from [facility]; Patient was last seen in April and has follow up appointment in November with Dr. Vitale. Pended Rx to MD for signature.     Estee Hopper, RN Care Coordinator

## 2017-11-27 ENCOUNTER — OFFICE VISIT (OUTPATIENT)
Dept: NEUROLOGY | Facility: CLINIC | Age: 62
End: 2017-11-27

## 2017-11-27 ENCOUNTER — RESEARCH ENCOUNTER (OUTPATIENT)
Dept: NEUROLOGY | Facility: CLINIC | Age: 62
End: 2017-11-27

## 2017-11-27 VITALS
TEMPERATURE: 98.2 F | WEIGHT: 168.1 LBS | BODY MASS INDEX: 28.01 KG/M2 | SYSTOLIC BLOOD PRESSURE: 144 MMHG | HEART RATE: 89 BPM | HEIGHT: 65 IN | DIASTOLIC BLOOD PRESSURE: 81 MMHG | OXYGEN SATURATION: 93 % | RESPIRATION RATE: 20 BRPM

## 2017-11-27 DIAGNOSIS — G47.36 SLEEP-RELATED HYPOVENTILATION DUE TO NEUROMUSCULAR DISORDER (H): ICD-10-CM

## 2017-11-27 DIAGNOSIS — G70.00 MYASTHENIA GRAVIS WITHOUT EXACERBATION (H): Primary | ICD-10-CM

## 2017-11-27 DIAGNOSIS — G70.9 SLEEP-RELATED HYPOVENTILATION DUE TO NEUROMUSCULAR DISORDER (H): ICD-10-CM

## 2017-11-27 DIAGNOSIS — G70.01 MG WITH EXACERBATION (MYASTHENIA GRAVIS) (H): ICD-10-CM

## 2017-11-27 RX ORDER — THIAMINE HCL 50 MG
50 TABLET ORAL DAILY
COMMUNITY

## 2017-11-27 RX ORDER — SPIRONOLACTONE 100 MG/1
100 TABLET, FILM COATED ORAL DAILY
Qty: 5 TABLET | Refills: 3 | Status: SHIPPED | OUTPATIENT
Start: 2017-11-27 | End: 2019-06-04

## 2017-11-27 RX ORDER — FLUOROMETHOLONE 0.1 %
1 SUSPENSION, DROPS(FINAL DOSAGE FORM)(ML) OPHTHALMIC (EYE) 3 TIMES DAILY
Refills: 1 | COMMUNITY
Start: 2017-10-04

## 2017-11-27 ASSESSMENT — PAIN SCALES - GENERAL: PAINLEVEL: NO PAIN (0)

## 2017-11-27 NOTE — LETTER
11/27/2017       RE: Martina Kim  [address]  [place] [address]     Dear Colleague,    Thank you for referring your patient, Martina Kim, to the [facility] NEUROLOGY at [facility]. Please see a copy of my visit note below.    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 She is here with her .    Patient was last seen on 4/3/17, since the last visit she had a colonoscopy about two weeks ago and ended up being hospitalized briefly for worsening weakness, hypotension, elevated lactic acid, rigors and concern for sepsis. She reports she received one dose of rocephin in the ED and then was observed. She never became febrile and did not have a leukocytosis.  Since then she has noticed that she has been more run down. She wonders if she needs another couple of doses of IVIG to help her strength. She experiences exertional dyspnea more quickly. 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 She has concerns about the fact that she will not have health insurance coverage after February of 2018.     She reports her voice becomes more hoarse and soft when she is fatigued. She has not had any swallowing difficulty. She has not had any significant double vision but feels some eye twitching intermittently. She continues to have muscle twitching and cramping regularly. She uses a cane to walk. She has not had any major falls.     She continues to take 17.5 mg of prednisone daily. She also receives SQ Hizentra weekly.     Prior pertinent laboratory work-up:  2/2012 Ach Binding Ab elevated 53.0, Striated Type Muscle Ab IgG positive (1:40).   4/20/17 Neuronal (V-G) K+ channel Ab 0.10 (refe <=0.02).   Anti- DAVIS + at 250.     Past  "Medical History:   Myasthenia Gravis  Urinary Incontinence    Past Surgical History:  Colonoscopy  Cystoscopy    Medical Allergies:  As per medical record     Current Medications: As per medical record     Review of Systems: A 10 point review of systems was obtained and was negative except for what was noted above.    Physical examination:    /81  Pulse 89  Temp 98.2  F (36.8  C)  Resp 20  Ht 1.638 m (5' 4.5\")  Wt 76.2 kg (168 lb 1.6 oz)  SpO2 93%  Breastfeeding? No  BMI 28.41 kg/m2     General Appearance: NAD    Skin: There are no rashes or other skin lesions.    Musculoskeletal:  There is no scoliosis, lordosis, kyphosis, pes cavus, or hammertoes.    Neurologic examination:    Mental status:  Patient is alert, attentive, and oriented x 3.  Language is coherent and fluent without dysarthria or aphasia.  Memory, comprehension and ability to follow commands were intact.       Cranial nerves:  VFF. Pupils were round and reacted to light.  Extraocular movements were full. There is eye closure weakness (L>R). Intermittent left eyelid fasciculations. Slight mouth closure weakness. There was no jaw, palate or tongue weakness or atrophy. Hearing was grossly intact.  Shoulder shrug was normal.  She has shortness of breath while speaking during the examination and is only able to count until about 18-20 before she has to take a breath in.      Motor exam: No atrophy or fasciculations.  Manual muscle testing revealed the following MRC grade muscle power: upper extremity strength was 5/5 with encouragement but did have a component of inconsistent give away.    Right Left   Neck flexion 4+    Neck extension: 5    Shoulder abduction:  5 5   Elbow extension: 5 5   Elbow flexion:  5 5   Wrist flexion:  5 5   Wrist extension:  5 5   Finger flexion 5 5   FDI 5 5   APB 5 5   Hip flexion 5 5   Hip extension 5 5   Knee flexion 5 5   Knee extension 5 5   Dorsiflexion 5 5   Plantar flexion 5 5     Complex motor skills " revealed normal coordination.  Finger-nose- finger and heel to shin were intact.       Sensory exam revealed normal perception of  light touch proximally and distally in the arms and legs bilaterally.     Deep tendon reflexes:   Right Left   Triceps 3 3   Biceps 3 3   Brachioradialis 3 3   Knee jerk 3 3   Ankle jerk 2 2   Plantar responses were flexor bilaterally.       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 Unfortunately, she has developed some worsening subjective weakness after a routine colonoscopy which resulted in hospitalization for possible sepsis. She received a single dose of IV rocephin and after observation was eventually stable enough to be discharged home. She does have shortness of breath with speaking during today's interview, she is only able to count to about 18-20 before she takes a breath. She has previously received a dose of IVIG and has had improvement of her symptoms.     Plan:      1. PFT's  2. IVIg 0.5g/kg x 4 days with premedication ( tylenol, benadryl, methylprednisolone, aldactone).   3. Continue Prednisone 17.5 mg daily  4. Continue SQ Hizentra weekly  5. Follow up in 3 months      Miri Monreal DO  Neuromuscular Medicine Fellow [id number]  [facility] Department of Neurology  Pager # [phone]    I personally examined this patient, reviewed vital signs and pertinent auxiliary test results.  This note details our findings, impression and plan that we formulated together.    I spent total of 15 minutes face-to-face with Martina Kim during today's visit. Over 50% of this time was spent counseling the patient and coordinating care. See note for details.    Sincerely yours,      Frederick Vitale MD  Pediatric  Neurology  [phone]

## 2017-11-27 NOTE — PROGRESS NOTES
[facility] Neuromuscular Registry    IRB # [id number]  PI: Sean Kraft MD, PhD  : Michelle Macias    Patient was approached for possible participation for the above study. The current approved IRB consent form was discussed and explained to the patient.  It was discussed that involvement with the study is voluntary and refusal to participate would not involve penalty or decrease benefits at which the patient is entitled, and the subject may discontinue his/her involvement at any time without penalty or loss in benefits. We also discussed that this study does not have follow up visits or procedures. Patient was informed that an additional contact might occur if data needed was not found in patient s medical record. The patient was given time to review and ask any questions about the consent. Patient was shown contact information for PI and study staff in consent for future questions. Patient verbalized understanding of consent and study by restating the purpose, procedures, duration, risk, confidentiality of PHI, and voluntarily participation. Patient printed, signed and dated the consent and HIPAA form prior to study involvement. A copy was given to the patient for their records.     Subject Consent/HIPAA : SIGNED ON 11.27.2017

## 2017-11-27 NOTE — PROGRESS NOTES
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 She is here with her .    Patient was last seen on 4/3/17, since the last visit she had a colonoscopy about two weeks ago and ended up being hospitalized briefly for worsening weakness, hypotension, elevated lactic acid, rigors and concern for sepsis. She reports she received one dose of rocephin in the ED and then was observed. She never became febrile and did not have a leukocytosis.  Since then she has noticed that she has been more run down. She wonders if she needs another couple of doses of IVIG to help her strength. She experiences exertional dyspnea more quickly. 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 She has concerns about the fact that she will not have health insurance coverage after February of 2018.     She reports her voice becomes more hoarse and soft when she is fatigued. She has not had any swallowing difficulty. She has not had any significant double vision but feels some eye twitching intermittently. She continues to have muscle twitching and cramping regularly. She uses a cane to walk. She has not had any major falls.     She continues to take 17.5 mg of prednisone daily. She also receives SQ Hizentra weekly.     Prior pertinent laboratory work-up:  2/2012 Ach Binding Ab elevated 53.0, Striated Type Muscle Ab IgG positive (1:40).   4/20/17 Neuronal (V-G) K+ channel Ab 0.10 (refe <=0.02).   Anti- DAVIS + at 250.     Past Medical History:   Myasthenia Gravis  Urinary Incontinence    Past Surgical History:  Colonoscopy  Cystoscopy    Medical Allergies:  As per medical record     Current Medications: As per medical record     Review of Systems: A 10 point review of systems was obtained and was negative except  "for what was noted above.    Physical examination:    /81  Pulse 89  Temp 98.2  F (36.8  C)  Resp 20  Ht 1.638 m (5' 4.5\")  Wt 76.2 kg (168 lb 1.6 oz)  SpO2 93%  Breastfeeding? No  BMI 28.41 kg/m2     General Appearance: NAD    Skin: There are no rashes or other skin lesions.    Musculoskeletal:  There is no scoliosis, lordosis, kyphosis, pes cavus, or hammertoes.    Neurologic examination:    Mental status:  Patient is alert, attentive, and oriented x 3.  Language is coherent and fluent without dysarthria or aphasia.  Memory, comprehension and ability to follow commands were intact.       Cranial nerves:  VFF. Pupils were round and reacted to light.  Extraocular movements were full. There is eye closure weakness (L>R). Intermittent left eyelid fasciculations. Slight mouth closure weakness. There was no jaw, palate or tongue weakness or atrophy. Hearing was grossly intact.  Shoulder shrug was normal.  She has shortness of breath while speaking during the examination and is only able to count until about 18-20 before she has to take a breath in.      Motor exam: No atrophy or fasciculations.  Manual muscle testing revealed the following MRC grade muscle power: upper extremity strength was 5/5 with encouragement but did have a component of inconsistent give away.    Right Left   Neck flexion 4+    Neck extension: 5    Shoulder abduction:  5 5   Elbow extension: 5 5   Elbow flexion:  5 5   Wrist flexion:  5 5   Wrist extension:  5 5   Finger flexion 5 5   FDI 5 5   APB 5 5   Hip flexion 5 5   Hip extension 5 5   Knee flexion 5 5   Knee extension 5 5   Dorsiflexion 5 5   Plantar flexion 5 5     Complex motor skills revealed normal coordination.  Finger-nose- finger and heel to shin were intact.       Sensory exam revealed normal perception of  light touch proximally and distally in the arms and legs bilaterally.     Deep tendon reflexes:   Right Left   Triceps 3 3   Biceps 3 3   Brachioradialis 3 3   Knee " jerk 3 3   Ankle jerk 2 2   Plantar responses were flexor bilaterally.       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 Unfortunately, she has developed some worsening subjective weakness after a routine colonoscopy which resulted in hospitalization for possible sepsis. She received a single dose of IV rocephin and after observation was eventually stable enough to be discharged home. She does have shortness of breath with speaking during today's interview, she is only able to count to about 18-20 before she takes a breath. She has previously received a dose of IVIG and has had improvement of her symptoms.     Plan:      1. PFT's  2. IVIg 0.5g/kg x 4 days with premedication ( tylenol, benadryl, methylprednisolone, aldactone).   3. Continue Prednisone 17.5 mg daily  4. Continue SQ Hizentra weekly  5. Follow up in 3 months      Miri Monreal DO  Neuromuscular Medicine Fellow [id number]  [facility] Department of Neurology  Pager # [phone]    I personally examined this patient, reviewed vital signs and pertinent auxiliary test results.  This note details our findings, impression and plan that we formulated together.    I spent total of 15 minutes face-to-face with Martina Kim during today's visit. Over 50% of this time was spent counseling the patient and coordinating care. See note for details.    Sincerely yours,      Frederick Vitale MD  Pediatric Neurology  [phone]

## 2017-11-27 NOTE — NURSING NOTE
Chief Complaint   Patient presents with     RECHECK     [facility]- MYASTHENIA GRAVIS F/U     Morro Velez, CMA

## 2017-11-27 NOTE — MR AVS SNAPSHOT
After Visit Summary   11/27/2017    Martina Kim    MRN: [id number]           Patient Information     Date Of Birth          1955        Visit Information        Provider Department      11/27/2017 3:00 PM Frederick Vitale MD [facility]        Today's Diagnoses     MG with exacerbation (myasthenia gravis) (H)           Follow-ups after your visit        Follow-up notes from your care team     Return in about 2 months (around 2/5/2018).      Your next 10 appointments already scheduled     Nov 27, 2017  5:30 PM CST   PFT VISIT with  PFL GABBIE   [facility] Pulmonary Function Testing ([facility])    [address] [id number] [phone]            Dec 21, 2017  2:00 PM CST   Return Sleep Patient with Jed Wade MD   [facility] [place] ([facility])    [address] [id number] [phone]            Feb 19, 2018  8:00 AM CST   (Arrive by 7:45 AM)   Return Myasthenia Gravis with Frederick Vitale MD   [facility] ([facility])    [address] [address] [phone]              Future tests that were ordered for you today     Open Future Orders        Priority Expected Expires Ordered    General PFT Lab (Please always keep checked) Routine  11/27/2018 11/27/2017            Who to contact     Please call your clinic at [phone] to:    Ask questions about your health    Make or cancel appointments    Discuss your medicines    Learn about your test results    Speak to your doctor   If you have compliments or concerns about an experience at your clinic, or if you wish to file a complaint, please contact [facility] Patient Relations at [phone] or email us at [email or web address]         Additional Information About Your Visit    "     MyChart Information     [employer] gives you secure access to your electronic health record. If you see a primary care provider, you can also send messages to your care team and make appointments. If you have questions, please call your primary care clinic.  If you do not have a primary care provider, please call [phone] and they will assist you.      [employer] is an electronic gateway that provides easy, online access to your medical records. With [employer], you can request a clinic appointment, read your test results, renew a prescription or communicate with your care team.     To access your existing account, please contact your [facility] or call [phone] for assistance.        Care EveryWhere ID     This is your Care EveryWhere ID. This could be used by other organizations to access your [facility] medical records  [id number]        Your Vitals Were     Pulse Temperature Respirations Height Pulse Oximetry Breastfeeding?    89 98.2  F (36.8  C) 20 1.638 m (5' 4.5\") 93% No    BMI (Body Mass Index)                   28.41 kg/m2            Blood Pressure from Last 3 Encounters:   11/27/17 144/81   05/15/17 163/90   04/03/17 147/89    Weight from Last 3 Encounters:   11/27/17 76.2 kg (168 lb 1.6 oz)   05/15/17 77.1 kg (170 lb)   10/27/16 77.4 kg (170 lb 11.2 oz)                 Where to get your medicines      These medications were sent to [id number] - [place] - [address]  [address] [place] [address]     Phone:  [phone]     spironolactone 100 MG tablet          Primary Care Provider Office Phone # Fax #    Ivan Marie [phone]       [address] [address]        Equal Access to Services     ALEXANDRA LANCASTER : Katherine hills Somichele, waaxda luqadaha, qaybta kaalmada james, tristen kohli. [facility] [phone].    ATENCIÓN: Si jessica melendez " mann disposición servicios gratuitos de asistencia lingüística. Andriy dior [phone].    We comply with applicable federal civil rights laws and [place] laws. We do not discriminate on the basis of race, color, national origin, age, disability, sex, sexual orientation, or gender identity.            Thank you!     Thank you for choosing [facility]  for your care. Our goal is always to provide you with excellent care. Hearing back from our patients is one way we can continue to improve our services. Please take a few minutes to complete the written survey that you may receive in the mail after your visit with us. Thank you!             Your Updated Medication List - Protect others around you: Learn how to safely use, store and throw away your medicines at www.disposemymeds.org.          This list is accurate as of: 11/27/17  4:51 PM.  Always use your most recent med list.                   Brand Name Dispense Instructions for use Diagnosis    albuterol 108 (90 BASE) MCG/ACT Inhaler    PROAIR HFA/PROVENTIL HFA/VENTOLIN HFA    1 Inhaler    Inhale 2 puffs into the lungs every 6 hours as needed for shortness of breath / dyspnea or wheezing    Shortness of breath       amLODIPine-benazepril 2.5-10 MG per capsule    LOTREL    90 capsule    Take 1 capsule by mouth daily    Hypertension       aspirin 81 MG tablet      Take  by mouth daily. 1 Tablet Daily        calcium carbonate 1250 MG tablet    OS-LINDEN 500 mg Ramona. Ca     Take 500 mg by mouth 2 times daily    SOB (shortness of breath)       carBAMazepine 200 MG 12 hr capsule    CARBATROL    120 capsule    Take 1 capsule (200 mg) by mouth 2 times daily    Fasciculation       citalopram 20 MG tablet    celeXA    30 tablet    Take 1 tablet (20 mg) by mouth daily    Myasthenia gravis without exacerbation (H)       clonazePAM 1 MG tablet    klonoPIN    60 tablet    Take 1-2 tablets at bedtime as needed for sleep    Myasthenia gravis without exacerbation (H)        cyclobenzaprine 10 MG tablet    FLEXERIL    30 tablet    TAKE ONE TABLET BY MOUTH NIGHTLY AS NEEDED FOR MUSCLE SPASM    Fasciculation, Myasthenia gravis without exacerbation (H)       diphenhydrAMINE 50 MG capsule    BENADRYL     Take 50 mg by mouth every 6 hours as needed Every 4 hours during infusion        fluorometholone 0.1 % ophthalmic susp    FML LIQUIFILM     Apply 1 drop to eye 3 times daily        HEALON 10 MG/ML Soln intra-ocular inj   Generic drug:  hyaluronate      Apply 1 Application topically 4 times daily        IBUPROFEN PO      Take 800 mg by mouth every 8 hours as needed        Immune globulin 2 GM/10ML Soln    HIZENTRA    65 mL    Inject 13 g Subcutaneous once a week    Myasthenia gravis without exacerbation (H)       LUMIGAN 0.01 % Soln   Generic drug:  bimatoprost      Place 1 drop into both eyes daily.        order for [facility]      [facility] [employer] AutoBiPAP 11/6 cm, ComfortGel Blue Pte nasal mask.        predniSONE 5 MG tablet    DELTASONE    105 tablet    Take 3.5 tablets (17.5 mg) by mouth daily    Myasthenia gravis without exacerbation (H)       PREVACID 30 MG CR capsule   Generic drug:  LANsoprazole      Take 30 mg by mouth daily 1 Tablet Daily        SOLU-MEDROL 125 mg/2 mL injection   Generic drug:  methylPREDNISolone sodium succinate      Inject 125 mg into the vein once        spironolactone 100 MG tablet    ALDACTONE    5 tablet    Take 1 tablet (100 mg) by mouth daily Before infusion    MG with exacerbation (myasthenia gravis) (H)       vitamin  B-1 50 MG tablet      Take 50 mg by mouth daily        VITAMIN A PO      Take 8,000 Units by mouth daily 1 Tablet Daily        VITAMIN C PO      Take 1,000 mg by mouth daily        VITAMIN D PO      Take 5,000 Units by mouth daily 1 Tablet Daily        VITAMIN E      400 Units daily 1 Tablet Daily

## 2017-11-28 ENCOUNTER — TELEPHONE (OUTPATIENT)
Dept: NEUROLOGY | Facility: CLINIC | Age: 62
End: 2017-11-28

## 2017-11-28 NOTE — TELEPHONE ENCOUNTER
Patient was seen in clinic yesterday and the decision was made to do a round of IVIG. Therapy plan entered by Dr. Vitale and RN faxed to [facility] (Phone: [phone]; fax: [phone]).    Estee Hopper RN Care Coordinator

## 2017-11-30 ENCOUNTER — TELEPHONE (OUTPATIENT)
Dept: NEUROLOGY | Facility: CLINIC | Age: 62
End: 2017-11-30

## 2017-11-30 ENCOUNTER — TELEPHONE (OUTPATIENT)
Dept: OTHER | Facility: CLINIC | Age: 62
End: 2017-11-30

## 2017-11-30 LAB
EXPTIME-PRE: 5.8 SEC
FEF2575-%PRED-PRE: 106 %
FEF2575-PRE: 2.24 L/SEC
FEF2575-PRED: 2.1 L/SEC
FEFMAX-%PRED-PRE: 87 %
FEFMAX-PRE: 5.31 L/SEC
FEFMAX-PRED: 6.08 L/SEC
FEV1-%PRED-PRE: 73 %
FEV1-PRE: 1.77 L
FEV1FEV6-PRE: 89 %
FEV1FEV6-PRED: 80 %
FEV1FVC-PRE: 89 %
FEV1FVC-PRED: 79 %
FIFMAX-PRE: 3.7 L/SEC
FVC-%PRED-PRE: 64 %
FVC-PRE: 1.99 L
FVC-PRED: 3.06 L
MEP-PRE: 90 CMH2O
MIP-PRE: -75 CMH2O

## 2017-11-30 NOTE — TELEPHONE ENCOUNTER
Received phone call from Ling, pharmacist at [facility] (phone: [phone] ext: [phone]. She requested the following:    Clarification on:  -What brand of IVIG to be infused (pt previously had Gamunex 40 g)  -What dose?? Currently ordered is 0.5 g/kg but it is noted to give 28 g x4 every 7 days. 0.5 g/kg does not add up to 28g. Patient's current weight is 76 kg. So she should be receiving approx. 38 g per infusion.   -Need aldactone added to the therapy plan    Dr. Vitale, please complete the above ASAP.     Thank you,  Estee Hopper, RN Care Coordinator

## 2017-11-30 NOTE — TELEPHONE ENCOUNTER
Received a call from patient regarding IVIg. Notes record dosage at 0.5g/kg x 4 days but dosage written by ideal body weight for 28g. Patient very upset and says she has always received 40g. Clarified with pharmacist, she will receive 40g (based on actual body weight).

## 2017-12-01 NOTE — TELEPHONE ENCOUNTER
Therapy plan updated to reflect 0.5 g/kg every 7 days for 4 doses and to use Gamunex brand IVIG. Spoke with Ling, pharmacist at [facility] to update her. Therapy plan routed to Dr. Vitale for signature. Once signed, will fax to [phone].    Estee Hopper RN Care Coordinator

## 2017-12-06 DIAGNOSIS — R25.3 FASCICULATION: ICD-10-CM

## 2017-12-06 DIAGNOSIS — G70.00 MYASTHENIA GRAVIS WITHOUT EXACERBATION (H): ICD-10-CM

## 2017-12-06 RX ORDER — CYCLOBENZAPRINE HCL 10 MG
TABLET ORAL
Qty: 30 TABLET | Refills: 1 | Status: SHIPPED | OUTPATIENT
Start: 2017-12-06 | End: 2018-05-01

## 2017-12-06 NOTE — TELEPHONE ENCOUNTER
Received refill request for Flexeril from [facility]'s pharmacy. Rx pended to Dr. Vitale for review and signature.     Estee Hopper, RN Care Coordinator

## 2017-12-18 ENCOUNTER — MYC MEDICAL ADVICE (OUTPATIENT)
Dept: NEUROLOGY | Facility: CLINIC | Age: 62
End: 2017-12-18

## 2017-12-18 DIAGNOSIS — R25.3 FASCICULATION: ICD-10-CM

## 2017-12-19 ENCOUNTER — TELEPHONE (OUTPATIENT)
Dept: LAB | Facility: CLINIC | Age: 62
End: 2017-12-19

## 2017-12-19 ENCOUNTER — TELEPHONE (OUTPATIENT)
Dept: SLEEP MEDICINE | Facility: CLINIC | Age: 62
End: 2017-12-19

## 2017-12-19 DIAGNOSIS — G70.01 MYASTHENIA GRAVIS WITH EXACERBATION (H): Primary | ICD-10-CM

## 2017-12-19 RX ORDER — HEPARIN SODIUM (PORCINE) LOCK FLUSH IV SOLN 100 UNIT/ML 100 UNIT/ML
5 SOLUTION INTRAVENOUS
Status: DISCONTINUED | OUTPATIENT
Start: 2017-12-20 | End: 2017-12-21 | Stop reason: HOSPADM

## 2017-12-19 RX ORDER — CARBAMAZEPINE 200 MG/1
CAPSULE, EXTENDED RELEASE ORAL
Qty: 180 CAPSULE | Refills: 1 | Status: SHIPPED | OUTPATIENT
Start: 2017-12-19 | End: 2017-12-21

## 2017-12-19 NOTE — TELEPHONE ENCOUNTER
Called to confirm patients Thursday, December 21st appointment, she informed me that she may not be able to keep the appointment due to possibly having a procedure done. She needs orders for supplies before the end of the year. Orders have pended by [facility]. Will route message to Dr. Wade.

## 2017-12-19 NOTE — PATIENT INSTRUCTIONS
I advised her to stop taking the Amlodipine Besy-Benazepril HCL, and asked her to contact her provider that prescribed the ACE Inhibitor.  She should be on an alternative medication for about 2 weeks until the plasma exchange series is complete.  She will be coming to Apheresis on Wed. 12/20/17 for the initial consultation, and a central line placement if her veins are not adequate for the plasma exchange procedure.  We will plan on performing the first plasma exchange procedure on Thursday afternoon on 12-.  Dr. Weaver notified, and agrees with the plan.

## 2017-12-20 ENCOUNTER — RADIANT APPOINTMENT (OUTPATIENT)
Dept: RADIOLOGY | Facility: AMBULATORY SURGERY CENTER | Age: 62
End: 2017-12-20
Attending: PSYCHIATRY & NEUROLOGY
Payer: COMMERCIAL

## 2017-12-20 ENCOUNTER — HOSPITAL ENCOUNTER (OUTPATIENT)
Facility: AMBULATORY SURGERY CENTER | Age: 62
End: 2017-12-20
Attending: PHYSICIAN ASSISTANT
Payer: COMMERCIAL

## 2017-12-20 ENCOUNTER — HOSPITAL ENCOUNTER (OUTPATIENT)
Dept: LAB | Facility: CLINIC | Age: 62
Discharge: HOME | End: 2017-12-20
Attending: INTERNAL MEDICINE | Admitting: INTERNAL MEDICINE
Payer: COMMERCIAL

## 2017-12-20 ENCOUNTER — SURGERY (OUTPATIENT)
Age: 62
End: 2017-12-20

## 2017-12-20 VITALS
BODY MASS INDEX: 27.55 KG/M2 | TEMPERATURE: 97.8 F | RESPIRATION RATE: 20 BRPM | DIASTOLIC BLOOD PRESSURE: 72 MMHG | HEART RATE: 69 BPM | SYSTOLIC BLOOD PRESSURE: 129 MMHG | HEIGHT: 65 IN | WEIGHT: 165.34 LBS

## 2017-12-20 VITALS
HEART RATE: 71 BPM | OXYGEN SATURATION: 95 % | DIASTOLIC BLOOD PRESSURE: 81 MMHG | RESPIRATION RATE: 16 BRPM | TEMPERATURE: 97.4 F | SYSTOLIC BLOOD PRESSURE: 141 MMHG

## 2017-12-20 DIAGNOSIS — G70.01 MYASTHENIA GRAVIS WITH EXACERBATION (H): ICD-10-CM

## 2017-12-20 LAB
ERYTHROCYTE [DISTWIDTH] IN BLOOD BY AUTOMATED COUNT: 12.6 % (ref 10–15)
HCT VFR BLD AUTO: 40 % (ref 35–47)
HGB BLD-MCNC: 13.6 G/DL (ref 11.7–15.7)
INR PPP: 0.91 (ref 0.86–1.14)
MCH RBC QN AUTO: 30.4 PG (ref 26.5–33)
MCHC RBC AUTO-ENTMCNC: 34 G/DL (ref 31.5–36.5)
MCV RBC AUTO: 89 FL (ref 78–100)
PLATELET # BLD AUTO: 186 10E9/L (ref 150–450)
RBC # BLD AUTO: 4.48 10E12/L (ref 3.8–5.2)
WBC # BLD AUTO: 4.2 10E9/L (ref 4–11)

## 2017-12-20 PROCEDURE — 99212 OFFICE O/P EST SF 10 MIN: CPT | Mod: ZF

## 2017-12-20 PROCEDURE — 99213 OFFICE O/P EST LOW 20 MIN: CPT

## 2017-12-20 DEVICE — CATH VA PRECISION CHRONIC PALINDROME 14.5FRX19CM 8888119360P: Type: IMPLANTABLE DEVICE | Site: CHEST | Status: FUNCTIONAL

## 2017-12-20 RX ORDER — GABAPENTIN 300 MG/1
300 CAPSULE ORAL ONCE
Status: COMPLETED | OUTPATIENT
Start: 2017-12-20 | End: 2017-12-20

## 2017-12-20 RX ORDER — ONDANSETRON 4 MG/1
4 TABLET, ORALLY DISINTEGRATING ORAL EVERY 30 MIN PRN
Status: DISCONTINUED | OUTPATIENT
Start: 2017-12-20 | End: 2017-12-21 | Stop reason: HOSPADM

## 2017-12-20 RX ORDER — ACETAMINOPHEN 325 MG/1
975 TABLET ORAL ONCE
Status: COMPLETED | OUTPATIENT
Start: 2017-12-20 | End: 2017-12-20

## 2017-12-20 RX ORDER — SODIUM CHLORIDE, SODIUM LACTATE, POTASSIUM CHLORIDE, CALCIUM CHLORIDE 600; 310; 30; 20 MG/100ML; MG/100ML; MG/100ML; MG/100ML
INJECTION, SOLUTION INTRAVENOUS CONTINUOUS
Status: DISCONTINUED | OUTPATIENT
Start: 2017-12-20 | End: 2017-12-21 | Stop reason: HOSPADM

## 2017-12-20 RX ORDER — LIDOCAINE 40 MG/G
CREAM TOPICAL
Status: DISCONTINUED | OUTPATIENT
Start: 2017-12-20 | End: 2017-12-21 | Stop reason: HOSPADM

## 2017-12-20 RX ORDER — FENTANYL CITRATE 50 UG/ML
25-50 INJECTION, SOLUTION INTRAMUSCULAR; INTRAVENOUS
Status: DISCONTINUED | OUTPATIENT
Start: 2017-12-20 | End: 2017-12-21 | Stop reason: HOSPADM

## 2017-12-20 RX ORDER — NALOXONE HYDROCHLORIDE 0.4 MG/ML
.1-.4 INJECTION, SOLUTION INTRAMUSCULAR; INTRAVENOUS; SUBCUTANEOUS
Status: DISCONTINUED | OUTPATIENT
Start: 2017-12-20 | End: 2017-12-21 | Stop reason: HOSPADM

## 2017-12-20 RX ORDER — DIPHENHYDRAMINE HYDROCHLORIDE 50 MG/ML
25-50 INJECTION INTRAMUSCULAR; INTRAVENOUS
Status: CANCELLED | OUTPATIENT
Start: 2017-12-20

## 2017-12-20 RX ORDER — HEPARIN SODIUM (PORCINE) LOCK FLUSH IV SOLN 100 UNIT/ML 100 UNIT/ML
3 SOLUTION INTRAVENOUS EVERY 24 HOURS
Status: DISCONTINUED | OUTPATIENT
Start: 2017-12-20 | End: 2017-12-21 | Stop reason: HOSPADM

## 2017-12-20 RX ORDER — HEPARIN SODIUM (PORCINE) LOCK FLUSH IV SOLN 100 UNIT/ML 100 UNIT/ML
3 SOLUTION INTRAVENOUS
Status: DISCONTINUED | OUTPATIENT
Start: 2017-12-20 | End: 2017-12-21 | Stop reason: HOSPADM

## 2017-12-20 RX ORDER — ONDANSETRON 2 MG/ML
4 INJECTION INTRAMUSCULAR; INTRAVENOUS EVERY 30 MIN PRN
Status: DISCONTINUED | OUTPATIENT
Start: 2017-12-20 | End: 2017-12-21 | Stop reason: HOSPADM

## 2017-12-20 RX ORDER — SODIUM CHLORIDE 9 MG/ML
INJECTION, SOLUTION INTRAVENOUS CONTINUOUS
Status: DISCONTINUED | OUTPATIENT
Start: 2017-12-20 | End: 2017-12-21 | Stop reason: HOSPADM

## 2017-12-20 RX ORDER — MEPERIDINE HYDROCHLORIDE 25 MG/ML
12.5 INJECTION INTRAMUSCULAR; INTRAVENOUS; SUBCUTANEOUS
Status: DISCONTINUED | OUTPATIENT
Start: 2017-12-20 | End: 2017-12-21 | Stop reason: HOSPADM

## 2017-12-20 RX ADMIN — GABAPENTIN 300 MG: 300 CAPSULE ORAL at 09:57

## 2017-12-20 RX ADMIN — Medication 10 ML: at 10:37

## 2017-12-20 RX ADMIN — ACETAMINOPHEN 975 MG: 325 TABLET ORAL at 09:57

## 2017-12-20 NOTE — IP AVS SNAPSHOT
[facility]    [address] [place] [address]    Phone:  [phone]    Fax:  [phone]                                       After Visit Summary   12/20/2017    Martina Kim    MRN: [id number]           After Visit Summary Signature Page     I have received my discharge instructions, and my questions have been answered. I have discussed any challenges I see with this plan with the nurse or doctor.    ..........................................................................................................................................  Patient/Patient Representative Signature      ..........................................................................................................................................  Patient Representative Print Name and Relationship to Patient    ..................................................               ................................................  Date                                            Time    ..........................................................................................................................................  Reviewed by Signature/Title    ...................................................              ..............................................  Date                                                            Time

## 2017-12-20 NOTE — CONSULTS
Transfusion Medicine Consultation    Martina Kim MRN# [id number]   YOB: 1955 Age: 62 year old   Date of Consult: 2017     Reason for consult: Therapeutic apheresis for myasthenia gravis           Assessment and Plan:   62 year old female presents for consultation for apheresis. The patient has a history of myasthenia gravis which has responded well to treatment with IVIG.  This is the first time the patient will undergo TPE for an exacerbation of her MG.      The risks and benefits of the procedure were explained to the patient.  The patient requires a tunneled catheter to be placed for vascular access. She is quite nervous about possible sepsis from line placement, as she recently became septic after a colonoscopy procedure.  She is also nervous because her mother  due to problems with central line placement.  These concerns were discussed and all questions were answered.  The patient signed affirmation of informed consent.    The plan is to perform a series of 5 TPE, every other day, using albumin as the replacement fluid.  The patient had been taking an ACE inhibitor.  She will stop taking at least 24 hours prior to the first TPE, and understands that she should not take another dose until the series of 5 procedures is finished.      Please do not start or continue ace-inhibitors throughout the duration of a therapeutic plasma exchange series. Please notify the apheresis physician of any upcoming procedures, surgeries, or biopsies as therapeutic plasma exchange will affect coagulation factors.            Chief Complaint:   Transfusion medicine consultation.         History of Present Illness:   62 year old female presents for consultation for apheresis for an exacerbation of her myasthenia gravis.  Her past medical history includes a thymectomy for her MG.  Other than the signs and symptoms from MG, the patient he is currently well.  She has been experiencing shortness of breath,  "weakness, and her voice becomes hoarse as the day progresses.  The procedure, risks/benefits were discussed with the patient and she did not have any questions at that time.                Allergies:   Reviewed.          Medications:   Reviewed                Vital Signs:   /72  Pulse 69  Temp 97.8  F (36.6  C) (Oral)  Resp 20  Ht 1.638 m (5' 4.5\")  Wt 75 kg (165 lb 5.5 oz)  BMI 27.94 kg/m2              Data:         Last CBC:  Lab Results   Component Value Date    WBC 4.2 12/20/2017    HGB 13.6 12/20/2017    HCT 40.0 12/20/2017    MCV 89 12/20/2017     12/20/2017       ATTESTATION STATEMENT:   During the procedure this patient was directly seen and evaluated by me , Angeles Weaver MD, PhD.    Angeles Weaver MD, PhD  Transfusion Medicine Attending  Medical Director, Blood Bank Laboratory  Pager [phone]    "

## 2017-12-20 NOTE — CONSULTS
"APHERESIS INITIAL CONSULT CHECKLIST    Current Encounter Information  Current Encounter Information: Reason for Visit, Allergies and Current Meds  Procedure Requested: Plasma Exchange  History of: (Reason for Apheresis): Myasthenia Gravis    Access Assessment  Access Assessment  Vein Assessment:  Veins are adequate: No  Needs a catheter placed for Apheresis?: Yes, transfusion medicine physician informed.    Vital Signs  Vital Signs  BP: 129/72  Pulse: 69  Temp: 97.8  F (36.6  C)  Temp src: Oral  Resp: 20  O2 Sats: 97%  Height: 163.8 cm (5' 4.5\")  Weight: 75 kg (165 lb 5.5 oz)    Reviewed   Review With Patient  Have you read the brochure Getting ready for Apheresis?: Yes  Have you had any invasive procedures, surgery, biopsy, bleeding in the last month?: No  Review medications and allergies: Yes  Patient given tour of the unit: Yes  Photophoresis: sun precautions reviewed with patient: N/A    Additional Information  Notes, needs and time spent with patient  Explain procedure, side effects or reactions, instructions: Yes  Patient has special need?: No  Time spent: 30 minutes face to face time with patient; assessment done, explained TPE procedure, discuss the possible jackie effects or reactions and the importance of low-fat diet.  Consent was signed with Dr. Weaver.  Questions and concerns were addressed accordingly.        "

## 2017-12-20 NOTE — IP AVS SNAPSHOT
MRN:[id number]                      After Visit Summary   12/20/2017    Martina Kim    MRN: [id number]           Thank you!     Thank you for choosing [facility] for your care. Our goal is always to provide you with excellent care. Hearing back from our patients is one way we can continue to improve our services. Please take a few minutes to complete the written survey that you may receive in the mail after you visit with us. Thank you!        Patient Information     Date Of Birth          1955        About your hospital stay     You were admitted on:  December 20, 2017 You last received care in the:  [facility]    You were discharged on:  December 20, 2017       Who to Call     For medical emergencies, please call 911.  For non-urgent questions about your medical care, please call your primary care provider or clinic, [phone]  For questions related to your surgery, please call your surgery clinic        Attending Provider     Provider Alex Senior PA-C Physician Assistant       Primary Care Provider Office Phone # Fax #    Ivan Marie [phone]      Your next 10 appointments already scheduled     Dec 21, 2017  8:00 AM CST   (Arrive by 7:45 AM)   Ther Plasma Exchange with  APHERESIS RN5, UC 38 ATC   [facility] ([facility] [place])    [address] [place] [address] [phone]            Dec 21, 2017  2:00 PM CST   Return Sleep Patient with Jed Wade MD   [facility] ([facility])    [address] [place] [id number] [phone]            Dec 23, 2017  8:00 AM CST   (Arrive by 7:45 AM)   Ther Plasma Exchange with UC APHERESIS RN1, UC 33 ATC   [facility] ([facility] [place])    [address] [place] [address]  Se  2nd Floor  [facility] [address]   [phone]            Feb 19, 2018  8:00 AM CST   (Arrive by 7:45 AM)   Return Myasthenia Gravis with Frederick Vitale MD   [facility] ([facility])    [address] [facility]  3rd [facility] [address]   [phone]              Further instructions from your care team         A collaboration between [facility] and [facility]  Experts in minimally invasive, targeted treatments performed using imaging guidance    Venous Access Device,  Port Catheter or Tunneled Central Line Placement    Today you had a procedure today to install a venous access device; either a tunneled central vein catheter or a subcutaneous port catheter.    One of our Radiology PAs performed this procedure for you today:  ? Mark Cordon PA-C  ? SHELLIE Bowling PA-C  ? Maykel Montes PA-C  ? Ronit Calderon PA-C   ? Werner Martins PA-C    After you go home:  - Drink plenty of fluids.  Generally 6-8 (8 ounce) glasses a day is recommended.  - Resume your regular diet unless otherwise ordered by a medical provider.  - Keep any applied tape/gauze dressings clean and dry.  Change tape/gauze dressings if they get wet or soiled.  - You may shower the following day after procedure, however cover and protect from moisture any tape/gauze dressings.  You may let water hit and run over dried skin glue, but do not scrub.  Pat the area dry after showering.  - Port placement incisions are closed with absorbable suture, meaning they do not need to be removed at a later date, and a topical skin adhesive (skin glue).  This glue will wear off in 7-14 days.  Do not remove before this time.  If 14 days have passed and residual glue is present, you may gently remove it.  - Do not apply gels, lotions, or ointments to the glue site for the first 10 days as this may cause the glue to prematurely soften and fail.  - Do not  perform strenuous activities or lift greater than 10 pounds for the next three days.  - If there is bleeding or oozing from the procedure site, apply firm pressure to the area for 5-10 minutes.  If the bleeding continues seek medical advice at the numbers below.  - Mild procedure site discomfort can be treated with an ice pack and over-the-counter pain relievers.        For 24 hours after any sedation used:  - Relax and take it easy.  No strenuous activities.  - Do not drive or operate machines at home or at work.  - No alcohol consumption.  - Do not make any important or legal decisions.    Call our Interventional Radiology (IR) service if:  - If you start bleeding from the procedure site.  If you do start to bleed from the site, lie down and hold some pressure on the site.  Our radiology provider can help you decide if you need to return to the hospital.  - If you have new or worsening pain related to the procedure.  - If you have concerning swelling at the procedure site.  - If you develop persistent nausea or vomiting.  - If you develop hives or a rash or any unexplained itching.  - If you have a fever of greater than 100.5  F and chills in the first 5 days after procedure.  - Any other concerns related to your procedure.      [facility]  Interventional Radiology (IR)  [address] [facility]  [address] [facility] [place] [address]    Contact Number:  [phone]  (IR control desk)  - Monday - Friday 8:00 am - 4:30 pm    After hours for urgent concerns:  [phone]  - After 4:30 pm Monday - Friday, Weekends and Holidays.   - Ask for Interventional Radiology on-call.  Someone is available 24 hours a day.  - [facility] toll free number:  [phone]        Pending Results     Date and Time Order Name Status Description    12/20/2017 0911 IR CVC TUNNEL PLACEMENT > 5 YRS OF AGE In process             Admission Information     Date & Time Provider Department Dept. Phone     12/20/2017 Alex Montes PA-C [facility] [phone]      Your Vitals Were     Blood Pressure Pulse Temperature Respirations Pulse Oximetry       147/83 71 98.5  F (36.9  C) (Oral) 16 99%       MyChart Information     [employer] gives you secure access to your electronic health record. If you see a primary care provider, you can also send messages to your care team and make appointments. If you have questions, please call your primary care clinic.  If you do not have a primary care provider, please call [phone] and they will assist you.      [employer] is an electronic gateway that provides easy, online access to your medical records. With [employer], you can request a clinic appointment, read your test results, renew a prescription or communicate with your care team.     To access your existing account, please contact your [facility] or call [phone] for assistance.        Care EveryWhere ID     This is your Care EveryWhere ID. This could be used by other organizations to access your [facility] medical records  [id number]        Equal Access to Services     ALEXANDRA LANCASTER : Hadii anand Storm, waaxda lualison, qaybta kaalchristiano ramirez, tristen kohli. So [facility] [phone].    ATENCIÓN: Si habla español, tiene a mann disposición servicios gratuitos de asistencia lingüística. Llame al [phone].    We comply with applicable federal civil rights laws and [place] laws. We do not discriminate on the basis of race, color, national origin, age, disability, sex, sexual orientation, or gender identity.               Review of your medicines      UNREVIEWED medicines. Ask your doctor about these medicines        Dose / Directions    albuterol 108 (90 BASE) MCG/ACT Inhaler   Commonly known as:  PROAIR HFA/PROVENTIL HFA/VENTOLIN HFA   Used for:  Shortness of breath        Dose:  2 puff   Inhale 2 puffs into the lungs every  6 hours as needed for shortness of breath / dyspnea or wheezing   Quantity:  1 Inhaler   Refills:  11       amLODIPine-benazepril 2.5-10 MG per capsule   Commonly known as:  LOTREL   Used for:  Hypertension        Dose:  1 capsule   Take 1 capsule by mouth daily   Quantity:  90 capsule   Refills:  3       aspirin 81 MG tablet        Take  by mouth daily. 1 Tablet Daily   Refills:  0       calcium carbonate 1250 MG tablet   Commonly known as:  OS-LINDEN 500 mg [place]. [place]   Used for:  SOB (shortness of breath)        Dose:  500 mg   Take 500 mg by mouth 2 times daily   Refills:  0       * carBAMazepine 200 MG 12 hr capsule   Commonly known as:  CARBATROL   Used for:  Fasciculation        Dose:  200 mg   Take 1 capsule (200 mg) by mouth 2 times daily   Quantity:  120 capsule   Refills:  5       * carBAMazepine 200 MG 12 hr capsule   Commonly known as:  CARBATROL   Used for:  Fasciculation        TAKE ONE CAPSULE BY MOUTH TWICE A DAY   Quantity:  180 capsule   Refills:  1       citalopram 20 MG tablet   Commonly known as:  celeXA   Used for:  Myasthenia gravis without exacerbation (H)        Dose:  20 mg   Take 1 tablet (20 mg) by mouth daily   Quantity:  30 tablet   Refills:  5       clonazePAM 1 MG tablet   Commonly known as:  klonoPIN   Used for:  Myasthenia gravis without exacerbation (H)        Take 1-2 tablets at bedtime as needed for sleep   Quantity:  60 tablet   Refills:  3       cyclobenzaprine 10 MG tablet   Commonly known as:  FLEXERIL   Used for:  Fasciculation, Myasthenia gravis without exacerbation (H)        TAKE ONE TABLET BY MOUTH AT BEDTIME AS NEEDED FOR FOR MUSCLE SPASM   Quantity:  30 tablet   Refills:  1       diphenhydrAMINE 50 MG capsule   Commonly known as:  BENADRYL        Dose:  50 mg   Take 50 mg by mouth every 6 hours as needed Every 4 hours during infusion   Refills:  0       fluorometholone 0.1 % ophthalmic susp   Commonly known as:  FML LIQUIFILM        Dose:  1 drop   Apply 1 drop to eye 3  times daily   Refills:  1       HEALON 10 MG/ML Soln intra-ocular inj   Generic drug:  hyaluronate        Dose:  1 Application   Apply 1 Application topically 4 times daily   Refills:  3       IBUPROFEN PO        Dose:  800 mg   Take 800 mg by mouth every 8 hours as needed   Refills:  0       Immune globulin 2 GM/10ML Soln   Commonly known as:  HIZENTRA   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   Refills:  0       predniSONE 5 MG tablet   Commonly known as:  DELTASONE   Used for:  Myasthenia gravis without exacerbation (H)        Dose:  17.5 mg   Take 3.5 tablets (17.5 mg) by mouth daily   Quantity:  105 tablet   Refills:  3       PREVACID 30 MG CR capsule   Generic drug:  LANsoprazole        Dose:  30 mg   Take 30 mg by mouth daily 1 Tablet Daily   Refills:  0       SOLU-MEDROL 125 mg/2 mL injection   Generic drug:  methylPREDNISolone sodium succinate        Dose:  125 mg   Inject 125 mg into the vein once   Refills:  0       spironolactone 100 MG tablet   Commonly known as:  ALDACTONE   Used for:  MG with exacerbation (myasthenia gravis) (H)        Dose:  100 mg   Take 1 tablet (100 mg) by mouth daily Before infusion   Quantity:  5 tablet   Refills:  3       vitamin  B-1 50 MG tablet        Dose:  50 mg   Take 50 mg by mouth daily   Refills:  0       VITAMIN A PO        Dose:  8000 Units   Take 8,000 Units by mouth daily 1 Tablet Daily   Refills:  0       VITAMIN C PO        Dose:  1000 mg   Take 1,000 mg by mouth daily   Refills:  0       VITAMIN D PO        Dose:  5000 Units   Take 5,000 Units by mouth daily 1 Tablet Daily   Refills:  0       VITAMIN E        Dose:  400 Units   400 Units daily 1 Tablet Daily   Refills:  0        * Notice:  This list has 2 medication(s) that are the same as other medications prescribed for you. Read the directions carefully, and ask your doctor or other care provider to review them with you.      CONTINUE these medicines which have NOT CHANGED        Dose / Directions    order for DME        [employer] Dream Station AutoBiPAP 11/6 cm, ComfortGel Blue Pte nasal mask.   Refills:  0                Protect others around you: Learn how to safely use, store and throw away your medicines at www.disposemymeds.org.             Medication List: This is a list of all your medications and when to take them. Check marks below indicate your daily home schedule. Keep this list as a reference.      Medications           Morning Afternoon Evening Bedtime As Needed    albuterol 108 (90 BASE) MCG/ACT Inhaler   Commonly known as:  PROAIR HFA/PROVENTIL HFA/VENTOLIN HFA   Inhale 2 puffs into the lungs every 6 hours as needed for shortness of breath / dyspnea or wheezing                                amLODIPine-benazepril 2.5-10 MG per capsule   Commonly known as:  LOTREL   Take 1 capsule by mouth daily                                aspirin 81 MG tablet   Take  by mouth daily. 1 Tablet Daily                                calcium carbonate 1250 MG tablet   Commonly known as:  OS-LINDEN 500 mg [place]. [place]   Take 500 mg by mouth 2 times daily                                * carBAMazepine 200 MG 12 hr capsule   Commonly known as:  CARBATROL   Take 1 capsule (200 mg) by mouth 2 times daily                                * carBAMazepine 200 MG 12 hr capsule   Commonly known as:  CARBATROL   TAKE ONE CAPSULE BY MOUTH TWICE A DAY                                citalopram 20 MG tablet   Commonly known as:  celeXA   Take 1 tablet (20 mg) by mouth daily                                clonazePAM 1 MG tablet   Commonly known as:  klonoPIN   Take 1-2 tablets at bedtime as needed for sleep                                cyclobenzaprine 10 MG  tablet   Commonly known as:  FLEXERIL   TAKE ONE TABLET BY MOUTH AT BEDTIME AS NEEDED FOR FOR MUSCLE SPASM                                diphenhydrAMINE 50 MG capsule   Commonly known as:  BENADRYL   Take 50 mg by mouth every 6 hours as needed Every 4 hours during infusion                                fluorometholone 0.1 % ophthalmic susp   Commonly known as:  FML LIQUIFILM   Apply 1 drop to eye 3 times daily                                HEALON 10 MG/ML Soln intra-ocular inj   Apply 1 Application topically 4 times daily   Generic drug:  hyaluronate                                IBUPROFEN PO   Take 800 mg by mouth every 8 hours as needed                                Immune globulin 2 GM/10ML Soln   Commonly known as:  HIZENTRA   Inject 13 g Subcutaneous once a week                                LUMIGAN 0.01 % Soln   Place 1 drop into both eyes daily.   Generic drug:  bimatoprost                                order for [facility]   Respir[employer]ox Dream Station AutoBiPAP 11/6 cm, ComfortGel Blue Pte nasal mask.                                predniSONE 5 MG tablet   Commonly known as:  DELTASONE   Take 3.5 tablets (17.5 mg) by mouth daily                                PREVACID 30 MG CR capsule   Take 30 mg by mouth daily 1 Tablet Daily   Generic drug:  LANsoprazole                                SOLU-MEDROL 125 mg/2 mL injection   Inject 125 mg into the vein once   Generic drug:  methylPREDNISolone sodium succinate                                spironolactone 100 MG tablet   Commonly known as:  ALDACTONE   Take 1 tablet (100 mg) by mouth daily Before infusion                                vitamin  B-1 50 MG tablet   Take 50 mg by mouth daily                                VITAMIN A PO   Take 8,000 Units by mouth daily 1 Tablet Daily                                VITAMIN C PO   Take 1,000 mg by mouth daily                                VITAMIN D PO   Take 5,000 Units by mouth daily 1 Tablet Daily                                 VITAMIN E   400 Units daily 1 Tablet Daily                                * Notice:  This list has 2 medication(s) that are the same as other medications prescribed for you. Read the directions carefully, and ask your doctor or other care provider to review them with you.

## 2017-12-20 NOTE — DISCHARGE INSTRUCTIONS
A collaboration between [facility] and [facility]  Experts in minimally invasive, targeted treatments performed using imaging guidance    Venous Access Device,  Port Catheter or Tunneled Central Line Placement    Today you had a procedure today to install a venous access device; either a tunneled central vein catheter or a subcutaneous port catheter.    One of our Radiology PAs performed this procedure for you today:  ? Mark Cordon PA-C  ? SHELLIE Bowling PA-C  ? Maykel Montes PA-C  ? Ronit Calderon PA-C   ? Werner Martins PA-C    After you go home:  - Drink plenty of fluids.  Generally 6-8 (8 ounce) glasses a day is recommended.  - Resume your regular diet unless otherwise ordered by a medical provider.  - Keep any applied tape/gauze dressings clean and dry.  Change tape/gauze dressings if they get wet or soiled.  - You may shower the following day after procedure, however cover and protect from moisture any tape/gauze dressings.  You may let water hit and run over dried skin glue, but do not scrub.  Pat the area dry after showering.  - Port placement incisions are closed with absorbable suture, meaning they do not need to be removed at a later date, and a topical skin adhesive (skin glue).  This glue will wear off in 7-14 days.  Do not remove before this time.  If 14 days have passed and residual glue is present, you may gently remove it.  - Do not apply gels, lotions, or ointments to the glue site for the first 10 days as this may cause the glue to prematurely soften and fail.  - Do not perform strenuous activities or lift greater than 10 pounds for the next three days.  - If there is bleeding or oozing from the procedure site, apply firm pressure to the area for 5-10 minutes.  If the bleeding continues seek medical advice at the numbers below.  - Mild procedure site discomfort can be treated with an ice pack and over-the-counter pain  relievers.        For 24 hours after any sedation used:  - Relax and take it easy.  No strenuous activities.  - Do not drive or operate machines at home or at work.  - No alcohol consumption.  - Do not make any important or legal decisions.    Call our Interventional Radiology (IR) service if:  - If you start bleeding from the procedure site.  If you do start to bleed from the site, lie down and hold some pressure on the site.  Our radiology provider can help you decide if you need to return to the hospital.  - If you have new or worsening pain related to the procedure.  - If you have concerning swelling at the procedure site.  - If you develop persistent nausea or vomiting.  - If you develop hives or a rash or any unexplained itching.  - If you have a fever of greater than 100.5  F and chills in the first 5 days after procedure.  - Any other concerns related to your procedure.      [facility]  Interventional Radiology (IR)  [address] [place] [address]    Contact Number:  [phone]  (IR control desk)  - Monday - Friday 8:00 am - 4:30 pm    After hours for urgent concerns:  [phone]  - After 4:30 pm Monday - Friday, Weekends and Holidays.   - Ask for Interventional Radiology on-call.  Someone is available 24 hours a day.  - [facility] toll free number:  [phone]

## 2017-12-21 ENCOUNTER — OFFICE VISIT (OUTPATIENT)
Dept: SLEEP MEDICINE | Facility: CLINIC | Age: 62
End: 2017-12-21
Payer: COMMERCIAL

## 2017-12-21 ENCOUNTER — HOSPITAL ENCOUNTER (OUTPATIENT)
Dept: LAB | Facility: CLINIC | Age: 62
Discharge: HOME | End: 2017-12-21
Attending: PSYCHIATRY & NEUROLOGY | Admitting: PSYCHIATRY & NEUROLOGY
Payer: COMMERCIAL

## 2017-12-21 VITALS
HEIGHT: 65 IN | WEIGHT: 166.8 LBS | DIASTOLIC BLOOD PRESSURE: 83 MMHG | RESPIRATION RATE: 16 BRPM | SYSTOLIC BLOOD PRESSURE: 133 MMHG | HEART RATE: 98 BPM | OXYGEN SATURATION: 95 % | BODY MASS INDEX: 27.79 KG/M2

## 2017-12-21 VITALS
DIASTOLIC BLOOD PRESSURE: 87 MMHG | SYSTOLIC BLOOD PRESSURE: 149 MMHG | TEMPERATURE: 97.5 F | WEIGHT: 165.34 LBS | HEIGHT: 64 IN | HEART RATE: 83 BPM | RESPIRATION RATE: 16 BRPM | BODY MASS INDEX: 28.23 KG/M2

## 2017-12-21 DIAGNOSIS — G70.9 SLEEP-RELATED HYPOVENTILATION DUE TO NEUROMUSCULAR DISORDER (H): Primary | ICD-10-CM

## 2017-12-21 DIAGNOSIS — G70.00 MYASTHENIA GRAVIS WITHOUT EXACERBATION (H): ICD-10-CM

## 2017-12-21 DIAGNOSIS — G47.36 SLEEP-RELATED HYPOVENTILATION DUE TO NEUROMUSCULAR DISORDER (H): Primary | ICD-10-CM

## 2017-12-21 DIAGNOSIS — G47.34 SLEEP RELATED HYPOXIA: ICD-10-CM

## 2017-12-21 LAB
BASOPHILS # BLD AUTO: 0 10E9/L (ref 0–0.2)
BASOPHILS NFR BLD AUTO: 0.5 %
DIFFERENTIAL METHOD BLD: NORMAL
EOSINOPHIL # BLD AUTO: 0.1 10E9/L (ref 0–0.7)
EOSINOPHIL NFR BLD AUTO: 1 %
ERYTHROCYTE [DISTWIDTH] IN BLOOD BY AUTOMATED COUNT: 12.6 % (ref 10–15)
FIBRINOGEN PPP-MCNC: 277 MG/DL (ref 200–420)
HCT VFR BLD AUTO: 35.5 % (ref 35–47)
HGB BLD-MCNC: 12.1 G/DL (ref 11.7–15.7)
IMM GRANULOCYTES # BLD: 0 10E9/L (ref 0–0.4)
IMM GRANULOCYTES NFR BLD: 0.5 %
INR PPP: 0.85 (ref 0.86–1.14)
LYMPHOCYTES # BLD AUTO: 0.9 10E9/L (ref 0.8–5.3)
LYMPHOCYTES NFR BLD AUTO: 15.1 %
MCH RBC QN AUTO: 30.4 PG (ref 26.5–33)
MCHC RBC AUTO-ENTMCNC: 34.1 G/DL (ref 31.5–36.5)
MCV RBC AUTO: 89 FL (ref 78–100)
MONOCYTES # BLD AUTO: 0.4 10E9/L (ref 0–1.3)
MONOCYTES NFR BLD AUTO: 6.9 %
NEUTROPHILS # BLD AUTO: 4.6 10E9/L (ref 1.6–8.3)
NEUTROPHILS NFR BLD AUTO: 76 %
NRBC # BLD AUTO: 0 10*3/UL
NRBC BLD AUTO-RTO: 0 /100
PLATELET # BLD AUTO: 191 10E9/L (ref 150–450)
RBC # BLD AUTO: 3.98 10E12/L (ref 3.8–5.2)
WBC # BLD AUTO: 6.1 10E9/L (ref 4–11)

## 2017-12-21 PROCEDURE — 25000125 ZZHC RX 250: Mod: ZF | Performed by: PATHOLOGY

## 2017-12-21 PROCEDURE — 99214 OFFICE O/P EST MOD 30 MIN: CPT | Performed by: INTERNAL MEDICINE

## 2017-12-21 PROCEDURE — 36514 APHERESIS PLASMA: CPT | Mod: ZF

## 2017-12-21 PROCEDURE — 85025 COMPLETE CBC W/AUTO DIFF WBC: CPT | Performed by: PATHOLOGY

## 2017-12-21 PROCEDURE — 85610 PROTHROMBIN TIME: CPT | Performed by: PATHOLOGY

## 2017-12-21 PROCEDURE — 85384 FIBRINOGEN ACTIVITY: CPT | Performed by: PATHOLOGY

## 2017-12-21 PROCEDURE — 25000128 H RX IP 250 OP 636: Mod: ZF | Performed by: PATHOLOGY

## 2017-12-21 PROCEDURE — P9041 ALBUMIN (HUMAN),5%, 50ML: HCPCS | Mod: ZF | Performed by: PATHOLOGY

## 2017-12-21 RX ORDER — ALBUMIN HUMAN 25 %
2500 INTRAVENOUS SOLUTION INTRAVENOUS
Status: COMPLETED | OUTPATIENT
Start: 2017-12-21 | End: 2017-12-21

## 2017-12-21 RX ORDER — HEPARIN SODIUM 1000 [USP'U]/ML
3 INJECTION, SOLUTION INTRAVENOUS; SUBCUTANEOUS
Status: COMPLETED | OUTPATIENT
Start: 2017-12-21 | End: 2017-12-21

## 2017-12-21 RX ORDER — CALCIUM GLUCONATE 100 MG/ML
AMPUL (ML) INTRAVENOUS
Status: COMPLETED | OUTPATIENT
Start: 2017-12-21 | End: 2017-12-21

## 2017-12-21 RX ADMIN — HEPARIN SODIUM 3000 UNITS: 1000 INJECTION, SOLUTION INTRAVENOUS; SUBCUTANEOUS at 09:34

## 2017-12-21 RX ADMIN — HEPARIN SODIUM 3000 UNITS: 1000 INJECTION, SOLUTION INTRAVENOUS; SUBCUTANEOUS at 09:33

## 2017-12-21 RX ADMIN — ANTICOAGULANT CITRATE DEXTROSE SOLUTION FORMULA A 515 ML: 12.25; 11; 3.65 SOLUTION INTRAVENOUS at 09:03

## 2017-12-21 RX ADMIN — ALBUMIN (HUMAN) 2500 ML: 12.5 INJECTION, SOLUTION INTRAVENOUS at 09:02

## 2017-12-21 RX ADMIN — CALCIUM GLUCONATE 3 G: 94 INJECTION, SOLUTION INTRAVENOUS at 09:03

## 2017-12-21 NOTE — NURSING NOTE
"Chief Complaint   Patient presents with     CPAP Follow Up     Follow up anna       Initial /83  Pulse 98  Resp 16  Ht 1.638 m (5' 4.5\")  Wt 75.7 kg (166 lb 12.8 oz)  SpO2 95%  BMI 28.19 kg/m2 Estimated body mass index is 28.19 kg/(m^2) as calculated from the following:    Height as of this encounter: 1.638 m (5' 4.5\").    Weight as of this encounter: 75.7 kg (166 lb 12.8 oz).  Medication Reconciliation: complete   ESS 16  Janet Murphy MA      "

## 2017-12-21 NOTE — MR AVS SNAPSHOT
After Visit Summary   12/21/2017    Martina Kim    MRN: [id number]           Patient Information     Date Of Birth          1955        Visit Information        Provider Department      12/21/2017 2:00 PM Jed Wade MD [facility]        Today's Diagnoses     Sleep-related hypoventilation due to neuromuscular disorder (H)    -  1    Myasthenia gravis without exacerbation (H)        Sleep related hypoxia          Care Instructions      Your BMI is Body mass index is 28.19 kg/(m^2).  Weight management is a personal decision.  If you are interested in exploring weight loss strategies, the following discussion covers the approaches that may be successful. Body mass index (BMI) is one way to tell whether you are at a healthy weight, overweight, or obese. It measures your weight in relation to your height.  A BMI of 18.5 to 24.9 is in the healthy range. A person with a BMI of 25 to 29.9 is considered overweight, and someone with a BMI of 30 or greater is considered obese. More than two-thirds of American adults are considered overweight or obese.  Being overweight or obese increases the risk for further weight gain. Excess weight may lead to heart disease and diabetes.  Creating and following plans for healthy eating and physical activity may help you improve your health.  Weight control is part of healthy lifestyle and includes exercise, emotional health, and healthy eating habits. Careful eating habits lifelong are the mainstay of weight control. Though there are significant health benefits from weight loss, long-term weight loss with diet alone may be very difficult to achieve- studies show long-term success with dietary management in less than 10% of people. Attaining a healthy weight may be especially difficult to achieve in those with severe obesity. In some cases, medications, devices and surgical management might be considered.  What can you do?  If you  are overweight or obese and are interested in methods for weight loss, you should discuss this with your provider.     Consider reducing daily calorie intake by 500 calories.     Keep a food journal.     Avoiding skipping meals, consider cutting portions instead.    Diet combined with exercise helps maintain muscle while optimizing fat loss. Strength training is particularly important for building and maintaining muscle mass. Exercise helps reduce stress, increase energy, and improves fitness. Increasing exercise without diet control, however, may not burn enough calories to loose weight.       Start walking three days a week 10-20 minutes at a time    Work towards walking thirty minutes five days a week     Eventually, increase the speed of your walking for 1-2 minutes at time    In addition, we recommend that you review healthy lifestyles and methods for weight loss available through the National Institutes of Health patient information sites:  http://win.niddk.nih.gov/publications/index.htm    And look into health and wellness programs that may be available through your health insurance provider, employer, local community center, or lester club.    Weight management plan: Patient was referred to their PCP to discuss a diet and exercise plan.              Follow-ups after your visit        Follow-up notes from your care team     Return in 1 year (on 12/21/2018) for PAP follow up.      Your next 10 appointments already scheduled     Dec 23, 2017  8:00 AM CST   (Arrive by 7:45 AM)   Ther Plasma Exchange with ANGELINE APHERESIS RN1,  33 ATC   [facility] ([facility])    [address] [place] [id number]   [phone]            Dec 26, 2017 12:30 PM CST   (Arrive by 12:15 PM)   Ther Plasma Exchange with ANGELINE APHERESIS RN5,  35 ATC   [facility] ([facility])    [address] [place] [address]  Se  2nd Floor  [facility] [address]   [phone]            Dec 28, 2017  8:00 AM CST   (Arrive by 7:45 AM)   Ther Plasma Exchange with UC APHERESIS RN1, UC 33 ATC   [facility] ([facility])    [address]  2nd Floor  [facility] [address]   [phone]            Dec 30, 2017  8:00 AM CST   (Arrive by 7:45 AM)   Ther Plasma Exchange with UC APHERESIS RN1,  33 ATC   [facility] ([facility])    [address]  2nd Floor  [facility] [address]   [phone]            Feb 19, 2018  8:00 AM CST   (Arrive by 7:45 AM)   Return Myasthenia Gravis with Frederick Vitale MD   [facility] ([facility])    [address]  3rd Floor  [facility] [address]   [phone]              Who to contact     If you have questions or need follow up information about today's clinic visit or your schedule please contact [facility] directly at [phone].  Normal or non-critical lab and imaging results will be communicated to you by MyChart, letter or phone within 4 business days after the clinic has received the results. If you do not hear from us within 7 days, please contact the clinic through Sarnov[employer] or phone. If you have a critical or abnormal lab result, we will notify you by phone as soon as possible.  Submit refill requests through [email or web address] or call your pharmacy and they will forward the refill request to us. Please allow 3 business days for your refill to be completed.          Additional Information About Your Visit        [employer] Software Information     [email or web address] gives you secure access to your electronic health record. If you see a primary care provider, you can also send messages to your care team and make appointments. If you have questions, please call your primary care clinic.  If you do not have a primary care  "provider, please call [phone] and they will assist you.        Care EveryWhere ID     This is your Care EveryWhere ID. This could be used by other organizations to access your [place] medical records  [id number]        Your Vitals Were     Pulse Respirations Height Pulse Oximetry BMI (Body Mass Index)       98 16 1.638 m (5' 4.5\") 95% 28.19 kg/m2        Blood Pressure from Last 3 Encounters:   12/21/17 133/83   12/21/17 149/87   12/20/17 141/81    Weight from Last 3 Encounters:   12/21/17 75.7 kg (166 lb 12.8 oz)   12/21/17 75 kg (165 lb 5.5 oz)   12/20/17 75 kg (165 lb 5.5 oz)              Today, you had the following     No orders found for display         Today's Medication Changes          These changes are accurate as of: 12/21/17 11:59 PM.  If you have any questions, ask your nurse or doctor.               These medicines have changed or have updated prescriptions.        Dose/Directions    carBAMazepine 200 MG 12 hr capsule   Commonly known as:  CARBATROL   This may have changed:  Another medication with the same name was removed. Continue taking this medication, and follow the directions you see here.   Used for:  Fasciculation        Dose:  200 mg   Take 1 capsule (200 mg) by mouth 2 times daily   Quantity:  120 capsule   Refills:  5         Stop taking these medicines if you haven't already. Please contact your care team if you have questions.     albuterol 108 (90 BASE) MCG/ACT Inhaler   Commonly known as:  PROAIR HFA/PROVENTIL HFA/VENTOLIN HFA                    Primary Care Provider Office Phone # Fax #    Ivan Marie [phone]       [place] [id number]        Equal Access to Services     ALEXANDRA LANCASTER : Katherine Storm, jason lepe, rome ramirez, tristen kohli. So [facility] [phone].    ATENCIÓN: Si habla español, tiene a mann disposición servicios gratuitos de asistencia " lingüísticaBessie Ashraf al [phone].    We comply with applicable federal civil rights laws and [place] laws. We do not discriminate on the basis of race, color, national origin, age, disability, sex, sexual orientation, or gender identity.            Thank you!     Thank you for choosing [facility]  for your care. Our goal is always to provide you with excellent care. Hearing back from our patients is one way we can continue to improve our services. Please take a few minutes to complete the written survey that you may receive in the mail after your visit with us. Thank you!             Your Updated Medication List - Protect others around you: Learn how to safely use, store and throw away your medicines at www.disposemymeds.org.          This list is accurate as of: 12/21/17 11:59 PM.  Always use your most recent med list.                   Brand Name Dispense Instructions for use Diagnosis    amLODIPine-benazepril 2.5-10 MG per capsule    LOTREL    90 capsule    Take 1 capsule by mouth daily    Hypertension       aspirin 81 MG tablet      Take  by mouth daily. 1 Tablet Daily        calcium carbonate 1250 MG tablet    OS-LINDEN 500 mg [place]. Ca     Take 500 mg by mouth 2 times daily    SOB (shortness of breath)       carBAMazepine 200 MG 12 hr capsule    CARBATROL    120 capsule    Take 1 capsule (200 mg) by mouth 2 times daily    Fasciculation       citalopram 20 MG tablet    celeXA    30 tablet    Take 1 tablet (20 mg) by mouth daily    Myasthenia gravis without exacerbation (H)       clonazePAM 1 MG tablet    klonoPIN    60 tablet    Take 1-2 tablets at bedtime as needed for sleep    Myasthenia gravis without exacerbation (H)       cyclobenzaprine 10 MG tablet    FLEXERIL    30 tablet    TAKE ONE TABLET BY MOUTH AT BEDTIME AS NEEDED FOR FOR MUSCLE SPASM    Fasciculation, Myasthenia gravis without exacerbation (H)       diphenhydrAMINE 50 MG capsule    BENADRYL     Take 50 mg by mouth every 6 hours  as needed Every 4 hours during infusion        fluorometholone 0.1 % ophthalmic susp    FML LIQUIFILM     Apply 1 drop to eye 3 times daily        HEALON 10 MG/ML Soln intra-ocular inj   Generic drug:  hyaluronate      Apply 1 Application topically 4 times daily        IBUPROFEN PO      Take 800 mg by mouth every 8 hours as needed        Immune globulin 2 GM/10ML Soln    HIZENTRA    65 mL    Inject 13 g Subcutaneous once a week    Myasthenia gravis without exacerbation (H)       LUMIGAN 0.01 % Soln   Generic drug:  bimatoprost      Place 1 drop into both eyes daily.        order for [facility]      [email or web address] Dream Station AutoBiPAP 11/6 cm, ComfortGel Blue Pte nasal mask.        predniSONE 5 MG tablet    DELTASONE    105 tablet    Take 3.5 tablets (17.5 mg) by mouth daily    Myasthenia gravis without exacerbation (H)       PREVACID 30 MG CR capsule   Generic drug:  LANsoprazole      Take 30 mg by mouth daily 1 Tablet Daily        SOLU-MEDROL 125 mg/2 mL injection   Generic drug:  methylPREDNISolone sodium succinate      Inject 125 mg into the vein once        spironolactone 100 MG tablet    ALDACTONE    5 tablet    Take 1 tablet (100 mg) by mouth daily Before infusion    MG with exacerbation (myasthenia gravis) (H)       vitamin  B-1 50 MG tablet      Take 50 mg by mouth daily        VITAMIN A PO      Take 8,000 Units by mouth daily 1 Tablet Daily        VITAMIN C PO      Take 1,000 mg by mouth daily        VITAMIN D PO      Take 5,000 Units by mouth daily 1 Tablet Daily        VITAMIN E      400 Units daily 1 Tablet Daily

## 2017-12-21 NOTE — PATIENT INSTRUCTIONS

## 2017-12-21 NOTE — PROGRESS NOTES
"  Hypoventilation - PAP Follow-Up Visit:    Chief Complaint   Patient presents with     CPAP Follow Up     Follow up anna       Martina Kim comes in today for follow-up of their neuromuscular hypoventilation, managed with BPAP.   Has been having trouble with hot flashes due to steroids with treatment (IVIg).  Now switching to plasmapheresis and shouldn't be getting steroids.  Thinks she'll feel better and has started using BPAP more as she is able to tolerate.    Total score - Arnold: 16 (12/21/2017  1:59 PM)    Respironics  Bilevel PAP 11/6 cmH2O 30 day usage data:  50% of days with > 4 hours of use. 11/30 days with no use. Average use 296 minutes per day.   Average leak 26.26 LPM.  Average % of night in large leak 0%.    AHI 3.25 events per hour.     Problem List:  Patient Active Problem List    Diagnosis Date Noted     Sleep related hypoxia 07/07/2016     Priority: Medium     Sleep-related hypoventilation due to neuromuscular disorder (H) 07/07/2016     Priority: Medium     Split Night:  Sleep Study 7/19/2016 - (174.0 lbs) AHI 6.1, RDI 6.6, Supine AHI 4.6, REM AHI -, Low O2% -, Time Spent ?88% 74.1, Time Spent ?89% 133.2, PAP was titrated to a pressure of 11/6/0 with an AHI 1.7. Time spent in REM supine at this pressure was - minutes.       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 Sleep-related hypoventilation due to neuromuscular disorder (H) G70.9     G47.36    2. " Myasthenia gravis without exacerbation (H) G70.00    3. Sleep related hypoxia G47.34    Warranty out humidifier chamber.  Patient is going to increase use.   We will meet up again in 1 year.     Tolerating PAP ok. Daytime symptoms are improved when she uses treatment.     Martina Kim will follow up in about 1 year(s).     Twenty-five minutes spent with patient, all of which were spent face-to-face counseling, consulting, coordinating plan of care.      Jed Wade MD    CC:  Ivan Marie,

## 2017-12-21 NOTE — PROCEDURES
Transfusion Medicine Procedure    Martina Kim MRN# [id number]   YOB: 1955 Age: 62 year old        Procedure: Therapeutic apheresis for myasthenia gravis           Assessment and Plan:   62 year old female presents for the 1/5 apheresis procedures. The patient has a history of myasthenia gravis which has responded well to treatment with IVIG.  This is the first time the patient will undergo TPE for an exacerbation of her MG.      The plan is to perform a series of 5 TPE, every other day, using albumin as the replacement fluid.  The patient had been taking an ACE inhibitor.  She will stop taking at least 24 hours prior to the first TPE, and understands that she should not take another dose until the series of 5 procedures is finished.      She is currently tolerating the first procedure.  She says that she already feels better than when she is given IVIG for an MG flare.  We will continue with plan per neurology.      Please do not start or continue ace-inhibitors throughout the duration of a therapeutic plasma exchange series. Please notify the apheresis physician of any upcoming procedures, surgeries, or biopsies as therapeutic plasma exchange will affect coagulation factors.            Chief Complaint:   Transfusion medicine consultation.         History of Present Illness:   62 year old female presents for consultation for apheresis for an exacerbation of her myasthenia gravis.  Her past medical history includes a thymectomy for her MG.  Other than the signs and symptoms from MG, the patient he is currently well.  She has been experiencing shortness of breath, weakness, and her voice becomes hoarse as the day progresses.  The procedure, risks/benefits were discussed with the patient and she did not have any questions at that time.                Allergies:   Reviewed.          Medications:   Reviewed                Vital Signs:   /87  Pulse 83  Temp 97.5  F (36.4  C) (Oral)  Resp  "16  Ht 1.638 m (5' 4.49\")  Wt 75 kg (165 lb 5.5 oz)  BMI 27.95 kg/m2              Data:         Last CBC:  Lab Results   Component Value Date    WBC 6.1 12/21/2017    HGB 12.1 12/21/2017    HCT 35.5 12/21/2017    MCV 89 12/21/2017     12/21/2017       ATTESTATION STATEMENT:   During the procedure this patient was directly seen and evaluated by me , Angeles Weaver MD, PhD.    Angeles Weaver MD, PhD  Transfusion Medicine Attending  Medical Director, Blood Bank Laboratory  Pager [phone]    "

## 2017-12-22 RX ORDER — DIPHENHYDRAMINE HYDROCHLORIDE 50 MG/ML
25-50 INJECTION INTRAMUSCULAR; INTRAVENOUS
Status: CANCELLED | OUTPATIENT
Start: 2017-12-22

## 2017-12-23 ENCOUNTER — HOSPITAL ENCOUNTER (OUTPATIENT)
Dept: LAB | Facility: CLINIC | Age: 62
Discharge: HOME | End: 2017-12-23
Attending: PSYCHIATRY & NEUROLOGY | Admitting: PSYCHIATRY & NEUROLOGY
Payer: COMMERCIAL

## 2017-12-23 VITALS
DIASTOLIC BLOOD PRESSURE: 78 MMHG | TEMPERATURE: 97.8 F | HEART RATE: 80 BPM | RESPIRATION RATE: 18 BRPM | SYSTOLIC BLOOD PRESSURE: 158 MMHG

## 2017-12-23 LAB
BASOPHILS # BLD AUTO: 0 10E9/L (ref 0–0.2)
BASOPHILS NFR BLD AUTO: 0.5 %
DIFFERENTIAL METHOD BLD: NORMAL
EOSINOPHIL # BLD AUTO: 0.1 10E9/L (ref 0–0.7)
EOSINOPHIL NFR BLD AUTO: 1.8 %
ERYTHROCYTE [DISTWIDTH] IN BLOOD BY AUTOMATED COUNT: 13 % (ref 10–15)
FIBRINOGEN PPP-MCNC: 170 MG/DL (ref 200–420)
HCT VFR BLD AUTO: 35.6 % (ref 35–47)
HGB BLD-MCNC: 12.2 G/DL (ref 11.7–15.7)
IMM GRANULOCYTES # BLD: 0.1 10E9/L (ref 0–0.4)
IMM GRANULOCYTES NFR BLD: 0.8 %
INR PPP: 0.96 (ref 0.86–1.14)
LYMPHOCYTES # BLD AUTO: 2 10E9/L (ref 0.8–5.3)
LYMPHOCYTES NFR BLD AUTO: 32.6 %
MCH RBC QN AUTO: 30.7 PG (ref 26.5–33)
MCHC RBC AUTO-ENTMCNC: 34.3 G/DL (ref 31.5–36.5)
MCV RBC AUTO: 89 FL (ref 78–100)
MONOCYTES # BLD AUTO: 0.6 10E9/L (ref 0–1.3)
MONOCYTES NFR BLD AUTO: 9.7 %
NEUTROPHILS # BLD AUTO: 3.4 10E9/L (ref 1.6–8.3)
NEUTROPHILS NFR BLD AUTO: 54.6 %
NRBC # BLD AUTO: 0 10*3/UL
NRBC BLD AUTO-RTO: 0 /100
PLATELET # BLD AUTO: 151 10E9/L (ref 150–450)
RBC # BLD AUTO: 3.98 10E12/L (ref 3.8–5.2)
WBC # BLD AUTO: 6.3 10E9/L (ref 4–11)

## 2017-12-23 PROCEDURE — P9041 ALBUMIN (HUMAN),5%, 50ML: HCPCS | Mod: ZF | Performed by: PATHOLOGY

## 2017-12-23 PROCEDURE — 85384 FIBRINOGEN ACTIVITY: CPT | Performed by: PATHOLOGY

## 2017-12-23 PROCEDURE — 85610 PROTHROMBIN TIME: CPT | Performed by: PATHOLOGY

## 2017-12-23 PROCEDURE — 25000128 H RX IP 250 OP 636: Mod: ZF | Performed by: PATHOLOGY

## 2017-12-23 PROCEDURE — 85025 COMPLETE CBC W/AUTO DIFF WBC: CPT | Performed by: PATHOLOGY

## 2017-12-23 PROCEDURE — 36514 APHERESIS PLASMA: CPT | Mod: ZF

## 2017-12-23 PROCEDURE — 25000125 ZZHC RX 250: Mod: ZF | Performed by: PATHOLOGY

## 2017-12-23 RX ORDER — HEPARIN SODIUM 1000 [USP'U]/ML
3 INJECTION, SOLUTION INTRAVENOUS; SUBCUTANEOUS
Status: COMPLETED | OUTPATIENT
Start: 2017-12-23 | End: 2017-12-23

## 2017-12-23 RX ORDER — ALBUMIN HUMAN 25 %
2500 INTRAVENOUS SOLUTION INTRAVENOUS
Status: COMPLETED | OUTPATIENT
Start: 2017-12-23 | End: 2017-12-23

## 2017-12-23 RX ORDER — CALCIUM GLUCONATE 100 MG/ML
AMPUL (ML) INTRAVENOUS
Status: COMPLETED | OUTPATIENT
Start: 2017-12-23 | End: 2017-12-23

## 2017-12-23 RX ADMIN — ALBUMIN (HUMAN) 2500 ML: 12.5 INJECTION, SOLUTION INTRAVENOUS at 10:10

## 2017-12-23 RX ADMIN — CALCIUM GLUCONATE 3 G: 94 INJECTION, SOLUTION INTRAVENOUS at 10:13

## 2017-12-23 RX ADMIN — HEPARIN SODIUM 3000 UNITS: 1000 INJECTION, SOLUTION INTRAVENOUS; SUBCUTANEOUS at 10:12

## 2017-12-23 RX ADMIN — ANTICOAGULANT CITRATE DEXTROSE SOLUTION FORMULA A 521 ML: 12.25; 11; 3.65 SOLUTION INTRAVENOUS at 10:14

## 2017-12-23 RX ADMIN — HEPARIN SODIUM 3000 UNITS: 1000 INJECTION, SOLUTION INTRAVENOUS; SUBCUTANEOUS at 10:11

## 2017-12-23 NOTE — IP AVS SNAPSHOT
MRN:[id number]                      After Visit Summary   12/23/2017    Martina Kim    MRN: [id number]           Thank you!     Thank you for choosing [facility] for your care. Our goal is always to provide you with excellent care. Hearing back from our patients is one way we can continue to improve our services. Please take a few minutes to complete the written survey that you may receive in the mail after you visit with us. Thank you!        Patient Information     Date Of Birth          1955        About your hospital stay     You were admitted on:  December 23, 2017 You last received care in the:  [facility]    You were discharged on:  December 23, 2017       Who to Call     For medical emergencies, please call 911.  For non-urgent questions about your medical care, please call your primary care provider or clinic, [phone]          Attending Provider     Provider Specialty    Frederick Vitale MD Pediatric Neurology       Primary Care Provider Office Phone # Fax #    Ivan Marie [phone]      Your next 10 appointments already scheduled     Dec 26, 2017 12:30 PM CST   (Arrive by 12:15 PM)   Ther Plasma Exchange with UC APHERESIS RN5, UC 35 ATC   [facility] ([facility])    [address] [place] [address]   [phone]            Dec 28, 2017  8:00 AM CST   (Arrive by 7:45 AM)   Ther Plasma Exchange with UC APHERESIS RN1, UC 33 ATC   [facility] ([facility])    [address] [place] [address]   [phone]            Dec 30, 2017  8:00 AM CST   (Arrive by 7:45 AM)   Ther Plasma Exchange with UC APHERESIS RN1, UC 33 ATC   [facility] ([facility])    [address] [place] [address]  [facility] [id number] [phone]            Feb 19, 2018  8:00 AM CST   (Arrive by 7:45 AM)   Return Myasthenia Gravis with Frederick Vitale MD   [facility] ([facility])    [address] [facility]  3rd [facility] [id number] [phone]              Further instructions from your care team       Plasma exchange:  IIf you received albumin as part of your treatment (this is the most common), some of your clotting factors have been removed.  You body will replace these factors, but you could be at a slight risk for bleeding.  Please inform us if you have had any bleeding or a recent invasive procedure, such as a biopsy or surgery.    Certain medications that lower your blood pressure (ace inhibitors) such as Lisinopril are contraindicated while you are receiving plasma exchange.  Please inform us if you have started taking this medication during your plasma exchange series.  Apheresis Blood Donor Center Post Instructions  You may feel tired after your procedure today.   Please call your doctor if you have:  bleeding that doesn t stop, fever, pain where a needle or tube (catheter) was placed, seizures, trouble breathing, red urine, nausea or vomiting, other health concerns.     If your symptoms are severe, call 911.  If you have a Central Venous Catheter:  Notify your doctor if you have had a fever, chills, shaking  or redness, warmth, swelling, drainage at the exit-site.  This could be a sign of infection.      The Apheresis/Blood Donor Center is open Monday-Friday 7:30 a.m. to 5 p.m.  The phone number is [phone].  A Transfusion Medicine physician can be reached after 5:00 p.m. weekdays and on weekends /Holidays by calling [phone], and asking for the physician on call.            Pending Results     No orders found from 12/21/2017 to 12/24/2017.            Admission Information     Date & Time Provider Department Dept. Phone    12/23/2017  Frederikc Vitale MD [facility] [phone]      Your Vitals Were     Blood Pressure Pulse Temperature Respirations          129/80 78 98.1  F (36.7  C) (Oral) 18        MyChart Information     [employer] gives you secure access to your electronic health record. If you see a primary care provider, you can also send messages to your care team and make appointments. If you have questions, please call your primary care clinic.  If you do not have a primary care provider, please call [phone] and they will assist you.        Care EveryWhere ID     This is your Care EveryWhere ID. This could be used by other organizations to access your [place] medical records  [id number]        Equal Access to Services     ALEXANDRA LANCASTER : Katherine Storm, jason lepe, rome ramirez, tristen kohli. So [facility] [phone].    ATENCIÓN: Si habla español, tiene a mann disposición servicios gratuitos de asistencia lingüística. Llame al [phone].    We comply with applicable federal civil rights laws and [place] laws. We do not discriminate on the basis of race, color, national origin, age, disability, sex, sexual orientation, or gender identity.               Review of your medicines      UNREVIEWED medicines. Ask your doctor about these medicines        Dose / Directions    amLODIPine-benazepril 2.5-10 MG per capsule   Commonly known as:  LOTREL   Used for:  Hypertension        Dose:  1 capsule   Take 1 capsule by mouth daily   Quantity:  90 capsule   Refills:  3       aspirin 81 MG tablet        Take  by mouth daily. 1 Tablet Daily   Refills:  0       calcium carbonate 1250 MG tablet   Commonly known as:  OS-LINDEN 500 mg [place]. [place]   Used for:  SOB (shortness of breath)        Dose:  500 mg   Take 500 mg by mouth 2 times daily   Refills:  0       carBAMazepine 200 MG 12 hr capsule   Commonly known as:  CARBATROL   Used for:  Fasciculation         Dose:  200 mg   Take 1 capsule (200 mg) by mouth 2 times daily   Quantity:  120 capsule   Refills:  5       citalopram 20 MG tablet   Commonly known as:  celeXA   Used for:  Myasthenia gravis without exacerbation (H)        Dose:  20 mg   Take 1 tablet (20 mg) by mouth daily   Quantity:  30 tablet   Refills:  5       clonazePAM 1 MG tablet   Commonly known as:  klonoPIN   Used for:  Myasthenia gravis without exacerbation (H)        Take 1-2 tablets at bedtime as needed for sleep   Quantity:  60 tablet   Refills:  3       cyclobenzaprine 10 MG tablet   Commonly known as:  FLEXERIL   Used for:  Fasciculation, Myasthenia gravis without exacerbation (H)        TAKE ONE TABLET BY MOUTH AT BEDTIME AS NEEDED FOR FOR MUSCLE SPASM   Quantity:  30 tablet   Refills:  1       diphenhydrAMINE 50 MG capsule   Commonly known as:  BENADRYL        Dose:  50 mg   Take 50 mg by mouth every 6 hours as needed Every 4 hours during infusion   Refills:  0       fluorometholone 0.1 % ophthalmic susp   Commonly known as:  FML LIQUIFILM        Dose:  1 drop   Apply 1 drop to eye 3 times daily   Refills:  1       HEALON 10 MG/ML Soln intra-ocular inj   Generic drug:  hyaluronate        Dose:  1 Application   Apply 1 Application topically 4 times daily   Refills:  3       IBUPROFEN PO        Dose:  800 mg   Take 800 mg by mouth every 8 hours as needed   Refills:  0       Immune globulin 2 GM/10ML Soln   Commonly known as:  HIZENTRA   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   Refills:  0       predniSONE 5 MG tablet   Commonly known as:  DELTASONE   Used for:  Myasthenia gravis without exacerbation (H)        Dose:  17.5 mg   Take 3.5 tablets (17.5 mg)  by mouth daily   Quantity:  105 tablet   Refills:  3       PREVACID 30 MG CR capsule   Generic drug:  LANsoprazole        Dose:  30 mg   Take 30 mg by mouth daily 1 Tablet Daily   Refills:  0       SOLU-MEDROL 125 mg/2 mL injection   Generic drug:  methylPREDNISolone sodium succinate        Dose:  125 mg   Inject 125 mg into the vein once   Refills:  0       spironolactone 100 MG tablet   Commonly known as:  ALDACTONE   Used for:  MG with exacerbation (myasthenia gravis) (H)        Dose:  100 mg   Take 1 tablet (100 mg) by mouth daily Before infusion   Quantity:  5 tablet   Refills:  3       vitamin  B-1 50 MG tablet        Dose:  50 mg   Take 50 mg by mouth daily   Refills:  0       VITAMIN A PO        Dose:  8000 Units   Take 8,000 Units by mouth daily 1 Tablet Daily   Refills:  0       VITAMIN C PO        Dose:  1000 mg   Take 1,000 mg by mouth daily   Refills:  0       VITAMIN D PO        Dose:  5000 Units   Take 5,000 Units by mouth daily 1 Tablet Daily   Refills:  0       VITAMIN E        Dose:  400 Units   400 Units daily 1 Tablet Daily   Refills:  0         CONTINUE these medicines which have NOT CHANGED        Dose / Directions    order for [facility]        [employer] [place] AutoBiPAP 11/6 cm, ComfortGel Blue Pte nasal mask.   Refills:  0                Protect others around you: Learn how to safely use, store and throw away your medicines at www.disposemymeds.org.             Medication List: This is a list of all your medications and when to take them. Check marks below indicate your daily home schedule. Keep this list as a reference.      Medications           Morning Afternoon Evening Bedtime As Needed    amLODIPine-benazepril 2.5-10 MG per capsule   Commonly known as:  LOTREL   Take 1 capsule by mouth daily                                aspirin 81 MG tablet   Take  by mouth daily. 1 Tablet Daily                                calcium carbonate 1250 MG tablet   Commonly known as:  OS-LINDEN 500 mg [place].  Ca   Take 500 mg by mouth 2 times daily                                carBAMazepine 200 MG 12 hr capsule   Commonly known as:  CARBATROL   Take 1 capsule (200 mg) by mouth 2 times daily                                citalopram 20 MG tablet   Commonly known as:  celeXA   Take 1 tablet (20 mg) by mouth daily                                clonazePAM 1 MG tablet   Commonly known as:  klonoPIN   Take 1-2 tablets at bedtime as needed for sleep                                cyclobenzaprine 10 MG tablet   Commonly known as:  FLEXERIL   TAKE ONE TABLET BY MOUTH AT BEDTIME AS NEEDED FOR FOR MUSCLE SPASM                                diphenhydrAMINE 50 MG capsule   Commonly known as:  BENADRYL   Take 50 mg by mouth every 6 hours as needed Every 4 hours during infusion                                fluorometholone 0.1 % ophthalmic susp   Commonly known as:  FML LIQUIFILM   Apply 1 drop to eye 3 times daily                                HEALON 10 MG/ML Soln intra-ocular inj   Apply 1 Application topically 4 times daily   Generic drug:  hyaluronate                                IBUPROFEN PO   Take 800 mg by mouth every 8 hours as needed                                Immune globulin 2 GM/10ML Soln   Commonly known as:  HIZENTRA   Inject 13 g Subcutaneous once a week                                LUMIGAN 0.01 % Soln   Place 1 drop into both eyes daily.   Generic drug:  bimatoprost                                order for [facility]   [facility] [employer] Auto[employer]AP 11/6 cm, ComfortGel Blue Pte nasal mask.                                predniSONE 5 MG tablet   Commonly known as:  DELTASONE   Take 3.5 tablets (17.5 mg) by mouth daily                                PREVACID 30 MG CR capsule   Take 30 mg by mouth daily 1 Tablet Daily   Generic drug:  LANsoprazole                                SOLU-MEDROL 125 mg/2 mL injection   Inject 125 mg into the vein once   Generic drug:  methylPREDNISolone sodium succinate                                 spironolactone 100 MG tablet   Commonly known as:  ALDACTONE   Take 1 tablet (100 mg) by mouth daily Before infusion                                vitamin  B-1 50 MG tablet   Take 50 mg by mouth daily                                VITAMIN A PO   Take 8,000 Units by mouth daily 1 Tablet Daily                                VITAMIN C PO   Take 1,000 mg by mouth daily                                VITAMIN D PO   Take 5,000 Units by mouth daily 1 Tablet Daily                                VITAMIN E   400 Units daily 1 Tablet Daily

## 2017-12-23 NOTE — DISCHARGE INSTRUCTIONS
Plasma exchange:  IIf you received albumin as part of your treatment (this is the most common), some of your clotting factors have been removed.  You body will replace these factors, but you could be at a slight risk for bleeding.  Please inform us if you have had any bleeding or a recent invasive procedure, such as a biopsy or surgery.    Certain medications that lower your blood pressure (ace inhibitors) such as Lisinopril are contraindicated while you are receiving plasma exchange.  Please inform us if you have started taking this medication during your plasma exchange series.  Apheresis Blood Donor Center Post Instructions  You may feel tired after your procedure today.   Please call your doctor if you have:  bleeding that doesn t stop, fever, pain where a needle or tube (catheter) was placed, seizures, trouble breathing, red urine, nausea or vomiting, other health concerns.     If your symptoms are severe, call 911.  If you have a Central Venous Catheter:  Notify your doctor if you have had a fever, chills, shaking  or redness, warmth, swelling, drainage at the exit-site.  This could be a sign of infection.      The Apheresis/Blood Donor Center is open Monday-Friday 7:30 a.m. to 5 p.m.  The phone number is [phone].  A Transfusion Medicine physician can be reached after 5:00 p.m. weekdays and on weekends /Holidays by calling [phone], and asking for the physician on call.

## 2017-12-23 NOTE — IP AVS SNAPSHOT
[facility]    [address] [address]    Phone:  [phone]    Fax:  [phone]                                       After Visit Summary   12/23/2017    Martina Kim    MRN: [id number]           After Visit Summary Signature Page     I have received my discharge instructions, and my questions have been answered. I have discussed any challenges I see with this plan with the nurse or doctor.    ..........................................................................................................................................  Patient/Patient Representative Signature      ..........................................................................................................................................  Patient Representative Print Name and Relationship to Patient    ..................................................               ................................................  Date                                            Time    ..........................................................................................................................................  Reviewed by Signature/Title    ...................................................              ..............................................  Date                                                            Time

## 2017-12-23 NOTE — PROCEDURES
Transfusion Medicine Procedure    Martina Kim MRN# [id number]   YOB: 1955 Age: 62 year old        Procedure: Therapeutic apheresis for myasthenia gravis           Assessment and Plan:   62 year old female presents for the 2/5 apheresis procedures. The patient has a history of myasthenia gravis which has responded well to treatment with IVIG.  This is the first time the patient will undergo a series of TPE for an exacerbation of her MG.      The plan is to perform a series of 5 TPE, every other day, using albumin as the replacement fluid.  The patient had been taking an ACE inhibitor.  She will stop taking at least 24 hours prior to the first TPE, and understands that she should not take another dose until the series of 5 procedures is finished.      She tolerated the second procedure.  Nursing reported that she feels well and had no complaints.  We will continue with plan per neurology.      Please do not start or continue ace-inhibitors throughout the duration of a therapeutic plasma exchange series. Please notify the apheresis physician of any upcoming procedures, surgeries, or biopsies as therapeutic plasma exchange will affect coagulation factors.                History of Present Illness:   62 year old female presents for consultation for apheresis for an exacerbation of her myasthenia gravis.  Her past medical history includes a thymectomy for her MG.  Other than the signs and symptoms from MG, the patient is currently well.  She has been experiencing shortness of breath, weakness, and her voice becomes hoarse as the day progresses.  The procedure, risks/benefits were discussed with the patient and she did not have any questions at that time.                Allergies:   Reviewed.          Medications:   Reviewed                Vital Signs:   /78  Pulse 80  Temp 97.8  F (36.6  C) (Oral)  Resp 18              Data:         Last CBC:  Lab Results   Component Value Date    WBC 6.3  12/23/2017    HGB 12.2 12/23/2017    HCT 35.6 12/23/2017    MCV 89 12/23/2017     12/23/2017       ATTESTATION STATEMENT:  I, Angeles Weaver MD, PhD., was available by pager during the entire procedure.  I have reviewed the chart and discussed the patient and current procedure with the nursing staff.      Angeles Weaver MD, PhD  Transfusion Medicine Attending  Medical Director, Blood Bank Laboratory  Pager [phone]

## 2017-12-25 RX ORDER — DIPHENHYDRAMINE HYDROCHLORIDE 50 MG/ML
25-50 INJECTION INTRAMUSCULAR; INTRAVENOUS
Status: CANCELLED | OUTPATIENT
Start: 2017-12-25

## 2017-12-26 ENCOUNTER — HOSPITAL ENCOUNTER (OUTPATIENT)
Dept: LAB | Facility: CLINIC | Age: 62
Discharge: HOME | End: 2017-12-26
Attending: PSYCHIATRY & NEUROLOGY | Admitting: PSYCHIATRY & NEUROLOGY
Payer: COMMERCIAL

## 2017-12-26 VITALS
TEMPERATURE: 98.1 F | HEART RATE: 71 BPM | SYSTOLIC BLOOD PRESSURE: 125 MMHG | DIASTOLIC BLOOD PRESSURE: 77 MMHG | WEIGHT: 169.3 LBS | BODY MASS INDEX: 28.61 KG/M2 | RESPIRATION RATE: 16 BRPM

## 2017-12-26 LAB
BASOPHILS # BLD AUTO: 0 10E9/L (ref 0–0.2)
BASOPHILS NFR BLD AUTO: 0.5 %
DIFFERENTIAL METHOD BLD: NORMAL
EOSINOPHIL # BLD AUTO: 0 10E9/L (ref 0–0.7)
EOSINOPHIL NFR BLD AUTO: 0.4 %
ERYTHROCYTE [DISTWIDTH] IN BLOOD BY AUTOMATED COUNT: 13.1 % (ref 10–15)
FIBRINOGEN PPP-MCNC: 175 MG/DL (ref 200–420)
HCT VFR BLD AUTO: 36.1 % (ref 35–47)
HGB BLD-MCNC: 12.2 G/DL (ref 11.7–15.7)
IMM GRANULOCYTES # BLD: 0 10E9/L (ref 0–0.4)
IMM GRANULOCYTES NFR BLD: 0.5 %
INR PPP: 0.94 (ref 0.86–1.14)
LYMPHOCYTES # BLD AUTO: 0.8 10E9/L (ref 0.8–5.3)
LYMPHOCYTES NFR BLD AUTO: 13.7 %
MCH RBC QN AUTO: 30.7 PG (ref 26.5–33)
MCHC RBC AUTO-ENTMCNC: 33.8 G/DL (ref 31.5–36.5)
MCV RBC AUTO: 91 FL (ref 78–100)
MONOCYTES # BLD AUTO: 0.2 10E9/L (ref 0–1.3)
MONOCYTES NFR BLD AUTO: 2.7 %
NEUTROPHILS # BLD AUTO: 4.5 10E9/L (ref 1.6–8.3)
NEUTROPHILS NFR BLD AUTO: 82.2 %
NRBC # BLD AUTO: 0 10*3/UL
NRBC BLD AUTO-RTO: 0 /100
PLATELET # BLD AUTO: 155 10E9/L (ref 150–450)
RBC # BLD AUTO: 3.98 10E12/L (ref 3.8–5.2)
WBC # BLD AUTO: 5.5 10E9/L (ref 4–11)

## 2017-12-26 PROCEDURE — 85384 FIBRINOGEN ACTIVITY: CPT | Performed by: PATHOLOGY

## 2017-12-26 PROCEDURE — 85025 COMPLETE CBC W/AUTO DIFF WBC: CPT | Performed by: PATHOLOGY

## 2017-12-26 PROCEDURE — 25000128 H RX IP 250 OP 636: Mod: ZF | Performed by: PATHOLOGY

## 2017-12-26 PROCEDURE — 85610 PROTHROMBIN TIME: CPT | Performed by: PATHOLOGY

## 2017-12-26 PROCEDURE — P9041 ALBUMIN (HUMAN),5%, 50ML: HCPCS | Mod: ZF | Performed by: PATHOLOGY

## 2017-12-26 PROCEDURE — 36514 APHERESIS PLASMA: CPT | Mod: ZF

## 2017-12-26 PROCEDURE — 25000125 ZZHC RX 250: Mod: ZF | Performed by: PATHOLOGY

## 2017-12-26 RX ORDER — HEPARIN SODIUM 1000 [USP'U]/ML
3 INJECTION, SOLUTION INTRAVENOUS; SUBCUTANEOUS
Status: COMPLETED | OUTPATIENT
Start: 2017-12-26 | End: 2017-12-26

## 2017-12-26 RX ORDER — ALBUMIN HUMAN 25 %
2500 INTRAVENOUS SOLUTION INTRAVENOUS
Status: COMPLETED | OUTPATIENT
Start: 2017-12-26 | End: 2017-12-26

## 2017-12-26 RX ORDER — CALCIUM GLUCONATE 100 MG/ML
AMPUL (ML) INTRAVENOUS
Status: COMPLETED | OUTPATIENT
Start: 2017-12-26 | End: 2017-12-26

## 2017-12-26 RX ADMIN — CALCIUM GLUCONATE: 94 INJECTION, SOLUTION INTRAVENOUS at 12:36

## 2017-12-26 RX ADMIN — HEPARIN SODIUM 3000 UNITS: 1000 INJECTION, SOLUTION INTRAVENOUS; SUBCUTANEOUS at 14:17

## 2017-12-26 RX ADMIN — ALBUMIN (HUMAN) 2500 ML: 12.5 INJECTION, SOLUTION INTRAVENOUS at 12:36

## 2017-12-26 RX ADMIN — ANTICOAGULANT CITRATE DEXTROSE SOLUTION FORMULA A 538 ML: 12.25; 11; 3.65 SOLUTION INTRAVENOUS at 12:36

## 2017-12-26 RX ADMIN — HEPARIN SODIUM 3000 UNITS: 1000 INJECTION, SOLUTION INTRAVENOUS; SUBCUTANEOUS at 14:16

## 2017-12-26 NOTE — PROCEDURES
Transfusion Medicine  Apheresis Procedure Note    Martina Kim MRN# [id number]   YOB: 1955 Age: 62 year old        Procedure: Therapeutic plasma exchange for myasthenia gravis           Assessment and Plan:   62 year old female presents for Therapeutic plasma exchange. The patient has a history of myasthenia gravis which has responded well to treatment with IVIG.  This is the first time the patient will undergo a series of TPE for an exacerbation of her MG.      The plan is to perform a series of 5 TPE, every other day, using albumin as the replacement fluid.  The patient had been taking an ACE inhibitor.  She stopped taking this at least 24 hours prior to the first TPE, and understands that she should not take another dose until the series of 5 procedures is finished.      Today is procedure #3 of the planned 5 procedures.  She is feeling well today.  She notes less feeling of fasciculations in her legs, also she says her  has bee noticing less drooping of her yes.  She denies nausea, vomiting, fevers, chills, diarrhea.  She is planning on checking in with her neurologist to help determine her long term plan, specifically with some emphasis on what the plan is for the line she currently has in, whether it needs to be removed or if she will need to get supplies to care for it while it is in place.  We will continue with plan per neurology.      Please do not start or continue ace-inhibitors throughout the duration of a therapeutic plasma exchange series. Please notify the apheresis physician of any upcoming procedures, surgeries, or biopsies as therapeutic plasma exchange will affect coagulation factors.                History of Present Illness:   62 year old female presents for apheresis for an exacerbation of her myasthenia gravis.  Her past medical history includes a thymectomy for her MG.                  Allergies:        Allergies   Allergen Reactions     Bee Venom  Anaphylaxis     Fentanyl And Related Nausea and Vomiting     Intractable vomiting     Wasp Venom Protein Anaphylaxis     Codeine Nausea and Vomiting     Fosamax [Alendronic Acid]      Other reaction(s): GI Upset  GERD     Morphine Nausea and Vomiting     Morphine Hcl Nausea and Vomiting     Pt states she is allergic to ALL Narcotics.     Oxycodone Nausea and Vomiting     Percocet [Oxycodone-Acetaminophen] Nausea and Vomiting     Phenytoin Nausea and Vomiting     Other reaction(s): GI Upset     Contrast Dye Rash     Other reaction(s): Intolerance-Can't Take  Worsens MG symptoms  Worsens MG symptoms               Medications:      Current Outpatient Prescriptions   Medication     cyclobenzaprine (FLEXERIL) 10 MG tablet     Thiamine HCl (VITAMIN  B-1) 50 MG tablet     fluorometholone (FML LIQUIFILM) 0.1 % ophthalmic susp     HEALON 10 MG/ML SOLN intra-ocular inj     spironolactone (ALDACTONE) 100 MG tablet     predniSONE (DELTASONE) 5 MG tablet     clonazePAM (KLONOPIN) 1 MG tablet     citalopram (CELEXA) 20 MG tablet     carBAMazepine (CARBATROL) 200 MG 12 hr capsule     order for DME     calcium carbonate (OS-LINDEN 500 MG [place]. [place]) 500 MG tablet     Immune globulin (HIZENTRA) 2 GM/10ML SOLN     Ascorbic Acid (VITAMIN C PO)     methylPREDNISolone sodium succinate (SOLU-MEDROL) 125 mg/2 mL injection     IBUPROFEN PO     amLODIPine-benazepril (LOTREL) 2.5-10 MG per capsule     bimatoprost (LUMIGAN) 0.01 % SOLN     diphenhydrAMINE (BENADRYL) 50 MG capsule     aspirin 81 MG tablet     Cholecalciferol (VITAMIN D PO)     VITAMIN A PO     VITAMIN E     LANsoprazole (PREVACID) 30 MG capsule     No current facility-administered medications for this encounter.                 Exam:   /71  Pulse 74  Temp 97.8  F (36.6  C) (Oral)  Resp 16  Wt 76.8 kg (169 lb 4.8 oz)  BMI 28.61 kg/m2  Alert, no apparent distress  Breathing appears comfortable on room air  Tunneled central line accessed for the procedure.            Data:            Results for orders placed or performed during the hospital encounter of 12/26/17 (from the past 24 hour(s))   Fibrinogen activity   Result Value Ref Range    Fibrinogen 175 (L) 200 - 420 mg/dL   INR   Result Value Ref Range    INR 0.94 0.86 - 1.14   CBC with platelets differential   Result Value Ref Range    WBC 5.5 4.0 - 11.0 10e9/L    RBC Count 3.98 3.8 - 5.2 10e12/L    Hemoglobin 12.2 11.7 - 15.7 g/dL    Hematocrit 36.1 35.0 - 47.0 %    MCV 91 78 - 100 fl    MCH 30.7 26.5 - 33.0 pg    MCHC 33.8 31.5 - 36.5 g/dL    RDW 13.1 10.0 - 15.0 %    Platelet Count 155 150 - 450 10e9/L    Diff Method Automated Method     % Neutrophils 82.2 %    % Lymphocytes 13.7 %    % Monocytes 2.7 %    % Eosinophils 0.4 %    % Basophils 0.5 %    % Immature Granulocytes 0.5 %    Nucleated RBCs 0 0 /100    Absolute Neutrophil 4.5 1.6 - 8.3 10e9/L    Absolute Lymphocytes 0.8 0.8 - 5.3 10e9/L    Absolute Monocytes 0.2 0.0 - 1.3 10e9/L    Absolute Eosinophils 0.0 0.0 - 0.7 10e9/L    Absolute Basophils 0.0 0.0 - 0.2 10e9/L    Abs Immature Granulocytes 0.0 0 - 0.4 10e9/L    Absolute Nucleated RBC 0.0              Procedure Summary:   A single volume therapeutic plasma exchange was performed and 5% albumin was used as the replacement fluid.  A dual lumen central line was used for access and allowed for appropriate flow during the procedure.  ACD-A was used for anticoagulation. To offset the effects of the citrate, calcium gluconate was given in the return line.  The patient's vital signs were stable throughout.  The patient tolerated the procedure well without complication.  See apheresis flowsheet for additional details.      Attestation: During the procedure the patient was directly seen and evaluated by me, Alex Terry MD.    Alex Terry MD  Transfusion Medicine Attending  Laboratory Medicine & Pathology  Pager: [phone]

## 2017-12-27 RX ORDER — ALBUMIN HUMAN 25 %
2500 INTRAVENOUS SOLUTION INTRAVENOUS
Status: CANCELLED | OUTPATIENT
Start: 2017-12-27

## 2017-12-27 RX ORDER — DIPHENHYDRAMINE HYDROCHLORIDE 50 MG/ML
25-50 INJECTION INTRAMUSCULAR; INTRAVENOUS
Status: CANCELLED | OUTPATIENT
Start: 2017-12-27

## 2017-12-27 RX ORDER — HEPARIN SODIUM 1000 [USP'U]/ML
3 INJECTION, SOLUTION INTRAVENOUS; SUBCUTANEOUS
Status: CANCELLED | OUTPATIENT
Start: 2017-12-27

## 2017-12-27 RX ORDER — CALCIUM GLUCONATE 100 MG/ML
AMPUL (ML) INTRAVENOUS
Status: CANCELLED | OUTPATIENT
Start: 2017-12-27

## 2017-12-28 ENCOUNTER — HOSPITAL ENCOUNTER (OUTPATIENT)
Dept: LAB | Facility: CLINIC | Age: 62
Discharge: HOME | End: 2017-12-28
Attending: PSYCHIATRY & NEUROLOGY | Admitting: PSYCHIATRY & NEUROLOGY
Payer: COMMERCIAL

## 2017-12-28 VITALS
WEIGHT: 167.33 LBS | RESPIRATION RATE: 16 BRPM | DIASTOLIC BLOOD PRESSURE: 69 MMHG | HEART RATE: 77 BPM | TEMPERATURE: 98.7 F | BODY MASS INDEX: 28.28 KG/M2 | SYSTOLIC BLOOD PRESSURE: 125 MMHG

## 2017-12-28 LAB
BASOPHILS # BLD AUTO: 0 10E9/L (ref 0–0.2)
BASOPHILS NFR BLD AUTO: 0.7 %
DIFFERENTIAL METHOD BLD: ABNORMAL
EOSINOPHIL # BLD AUTO: 0.1 10E9/L (ref 0–0.7)
EOSINOPHIL NFR BLD AUTO: 1.4 %
ERYTHROCYTE [DISTWIDTH] IN BLOOD BY AUTOMATED COUNT: 13.3 % (ref 10–15)
FIBRINOGEN PPP-MCNC: 157 MG/DL (ref 200–420)
HCT VFR BLD AUTO: 34.8 % (ref 35–47)
HGB BLD-MCNC: 11.7 G/DL (ref 11.7–15.7)
IMM GRANULOCYTES # BLD: 0 10E9/L (ref 0–0.4)
IMM GRANULOCYTES NFR BLD: 0.7 %
INR PPP: 1.02 (ref 0.86–1.14)
LYMPHOCYTES # BLD AUTO: 0.9 10E9/L (ref 0.8–5.3)
LYMPHOCYTES NFR BLD AUTO: 14.9 %
MCH RBC QN AUTO: 30.6 PG (ref 26.5–33)
MCHC RBC AUTO-ENTMCNC: 33.6 G/DL (ref 31.5–36.5)
MCV RBC AUTO: 91 FL (ref 78–100)
MONOCYTES # BLD AUTO: 0.4 10E9/L (ref 0–1.3)
MONOCYTES NFR BLD AUTO: 7 %
NEUTROPHILS # BLD AUTO: 4.4 10E9/L (ref 1.6–8.3)
NEUTROPHILS NFR BLD AUTO: 75.3 %
NRBC # BLD AUTO: 0 10*3/UL
NRBC BLD AUTO-RTO: 0 /100
PLATELET # BLD AUTO: 151 10E9/L (ref 150–450)
RBC # BLD AUTO: 3.82 10E12/L (ref 3.8–5.2)
WBC # BLD AUTO: 5.9 10E9/L (ref 4–11)

## 2017-12-28 PROCEDURE — 85610 PROTHROMBIN TIME: CPT | Performed by: PATHOLOGY

## 2017-12-28 PROCEDURE — 25000128 H RX IP 250 OP 636: Mod: ZF | Performed by: PATHOLOGY

## 2017-12-28 PROCEDURE — P9041 ALBUMIN (HUMAN),5%, 50ML: HCPCS | Mod: ZF | Performed by: PATHOLOGY

## 2017-12-28 PROCEDURE — 36514 APHERESIS PLASMA: CPT | Mod: ZF

## 2017-12-28 PROCEDURE — 85025 COMPLETE CBC W/AUTO DIFF WBC: CPT | Performed by: PATHOLOGY

## 2017-12-28 PROCEDURE — 85384 FIBRINOGEN ACTIVITY: CPT | Performed by: PATHOLOGY

## 2017-12-28 RX ORDER — CALCIUM GLUCONATE 100 MG/ML
AMPUL (ML) INTRAVENOUS
Status: COMPLETED | OUTPATIENT
Start: 2017-12-28 | End: 2017-12-28

## 2017-12-28 RX ORDER — HEPARIN SODIUM 1000 [USP'U]/ML
3 INJECTION, SOLUTION INTRAVENOUS; SUBCUTANEOUS
Status: COMPLETED | OUTPATIENT
Start: 2017-12-28 | End: 2017-12-28

## 2017-12-28 RX ORDER — ALBUMIN HUMAN 25 %
2500 INTRAVENOUS SOLUTION INTRAVENOUS
Status: COMPLETED | OUTPATIENT
Start: 2017-12-28 | End: 2017-12-28

## 2017-12-28 RX ADMIN — ALBUMIN (HUMAN) 2500 ML: 12.5 INJECTION, SOLUTION INTRAVENOUS at 10:17

## 2017-12-28 RX ADMIN — HEPARIN SODIUM 3000 UNITS: 1000 INJECTION, SOLUTION INTRAVENOUS; SUBCUTANEOUS at 09:53

## 2017-12-28 RX ADMIN — CALCIUM GLUCONATE 3 G: 94 INJECTION, SOLUTION INTRAVENOUS at 10:18

## 2017-12-28 RX ADMIN — HEPARIN SODIUM 3000 UNITS: 1000 INJECTION, SOLUTION INTRAVENOUS; SUBCUTANEOUS at 09:52

## 2017-12-28 NOTE — PROCEDURES
Transfusion Medicine  Apheresis Procedure Note    Martina Kim MRN# [id number]   YOB: 1955 Age: 62 year old        Procedure: Therapeutic plasma exchange for myasthenia gravis           Assessment and Plan:   62 year old female presents for Therapeutic plasma exchange. The patient has a history of myasthenia gravis which has responded well to treatment with IVIG.  This is the first time the patient will undergo a series of TPE for an exacerbation of her MG.      The plan is to perform a series of 5 TPE, every other day, using albumin as the replacement fluid.  The patient had been taking an ACE inhibitor.  She stopped taking this at least 24 hours prior to the first TPE, and understands that she should not take another dose until the series of 5 procedures is finished.      Today is procedure #4 of the planned 5 procedures.  She is feeling well today.  She notes resolution of the feeling of fasciculations in her legs, also notes less drooping of her yes.  She denies nausea, vomiting, fevers, chills, diarrhea.  She is going to follow up with her neurologist to help determine her long term plan, specifically with some emphasis on what the plan is for the line she currently has in.  Whether it needs to be removed or if she will need stop by the apheresis unit for occasional cares while it is in place.  We will continue with plan per neurology.      Please do not start or continue ace-inhibitors throughout the duration of a therapeutic plasma exchange series. Please notify the apheresis physician of any upcoming procedures, surgeries, or biopsies as therapeutic plasma exchange will affect coagulation factors.                History of Present Illness:   62 year old female presents for apheresis for an exacerbation of her myasthenia gravis.  Her past medical history includes a thymectomy for her MG.                  Allergies:        Allergies   Allergen Reactions     Bee Venom Anaphylaxis      Fentanyl And Related Nausea and Vomiting     Intractable vomiting     Wasp Venom Protein Anaphylaxis     Codeine Nausea and Vomiting     Fosamax [Alendronic Acid]      Other reaction(s): GI Upset  GERD     Morphine Nausea and Vomiting     Morphine Hcl Nausea and Vomiting     Pt states she is allergic to ALL Narcotics.     Oxycodone Nausea and Vomiting     Percocet [Oxycodone-Acetaminophen] Nausea and Vomiting     Phenytoin Nausea and Vomiting     Other reaction(s): GI Upset     Contrast Dye Rash     Other reaction(s): Intolerance-Can't Take  Worsens MG symptoms  Worsens MG symptoms               Medications:      Current Outpatient Prescriptions   Medication     cyclobenzaprine (FLEXERIL) 10 MG tablet     Thiamine HCl (VITAMIN  B-1) 50 MG tablet     fluorometholone (FML LIQUIFILM) 0.1 % ophthalmic susp     HEALON 10 MG/ML SOLN intra-ocular inj     spironolactone (ALDACTONE) 100 MG tablet     predniSONE (DELTASONE) 5 MG tablet     clonazePAM (KLONOPIN) 1 MG tablet     carBAMazepine (CARBATROL) 200 MG 12 hr capsule     order for DME     calcium carbonate (OS-LINDEN 500 MG [place]. [place]) 500 MG tablet     Immune globulin (HIZENTRA) 2 GM/10ML SOLN     Ascorbic Acid (VITAMIN C PO)     methylPREDNISolone sodium succinate (SOLU-MEDROL) 125 mg/2 mL injection     IBUPROFEN PO     amLODIPine-benazepril (LOTREL) 2.5-10 MG per capsule     bimatoprost (LUMIGAN) 0.01 % SOLN     diphenhydrAMINE (BENADRYL) 50 MG capsule     aspirin 81 MG tablet     Cholecalciferol (VITAMIN D PO)     VITAMIN A PO     VITAMIN E     LANsoprazole (PREVACID) 30 MG capsule     citalopram (CELEXA) 20 MG tablet     No current facility-administered medications for this encounter.                 Exam:   /69  Pulse 77  Temp 98.7  F (37.1  C) (Oral)  Resp 16  Wt 75.9 kg (167 lb 5.3 oz)  BMI 28.28 kg/m2  Alert, no apparent distress  Breathing appears comfortable on room air  Tunneled central line accessed for the procedure.            Data:            Results for orders placed or performed during the hospital encounter of 12/28/17 (from the past 24 hour(s))   Fibrinogen activity   Result Value Ref Range    Fibrinogen 157 (L) 200 - 420 mg/dL   INR   Result Value Ref Range    INR 1.02 0.86 - 1.14   CBC with platelets differential   Result Value Ref Range    WBC 5.9 4.0 - 11.0 10e9/L    RBC Count 3.82 3.8 - 5.2 10e12/L    Hemoglobin 11.7 11.7 - 15.7 g/dL    Hematocrit 34.8 (L) 35.0 - 47.0 %    MCV 91 78 - 100 fl    MCH 30.6 26.5 - 33.0 pg    MCHC 33.6 31.5 - 36.5 g/dL    RDW 13.3 10.0 - 15.0 %    Platelet Count 151 150 - 450 10e9/L    Diff Method Automated Method     % Neutrophils 75.3 %    % Lymphocytes 14.9 %    % Monocytes 7.0 %    % Eosinophils 1.4 %    % Basophils 0.7 %    % Immature Granulocytes 0.7 %    Nucleated RBCs 0 0 /100    Absolute Neutrophil 4.4 1.6 - 8.3 10e9/L    Absolute Lymphocytes 0.9 0.8 - 5.3 10e9/L    Absolute Monocytes 0.4 0.0 - 1.3 10e9/L    Absolute Eosinophils 0.1 0.0 - 0.7 10e9/L    Absolute Basophils 0.0 0.0 - 0.2 10e9/L    Abs Immature Granulocytes 0.0 0 - 0.4 10e9/L    Absolute Nucleated RBC 0.0              Procedure Summary:   A single volume therapeutic plasma exchange was performed and 5% albumin was used as the replacement fluid.  A dual lumen central line was used for access and allowed for appropriate flow during the procedure.  ACD-A was used for anticoagulation. To offset the effects of the citrate, calcium gluconate was given in the return line.  The patient's vital signs were stable throughout.  The patient tolerated the procedure well without complication.  See apheresis flowsheet for additional details.      Attestation: During the procedure the patient was directly seen and evaluated by me, Alex Terry MD.    Alex Terry MD  Transfusion Medicine Attending  Laboratory Medicine & Pathology  Pager: [phone]

## 2017-12-28 NOTE — DISCHARGE INSTRUCTIONS
Plasma exchange:  If you received blood products (plasma or red blood cells) as part of your treatment, you need to be aware that transfusion reactions can occur up to several hours after they have been given to you.  Call your physician if you experience any symptoms in the next 48 hours, including: breathing problems, rash, itching, hives, nausea or vomiting, fever or chills, blood in your urine or stools, or joint pain.  Please inform the Transfusion Medicine Physician by calling [phone] and asking for the physician on call.  If you received albumin as part of your treatment (this is the most common), some of your clotting factors have been removed.  You body will replace these factors, but you could be at a slight risk for bleeding.  Please inform us if you have had any bleeding or a recent invasive procedure, such as a biopsy or surgery.    Certain medications that lower your blood pressure (ace inhibitors) such as Lisinopril are contraindicated while you are receiving plasma exchange.  Please inform us if you have started taking this medication during your plasma exchange series.  Apheresis Blood Donor Center Post Instructions  You may feel tired after your procedure today.   Please call your doctor if you have:  bleeding that doesn t stop, fever, pain where a needle or tube (catheter) was placed, seizures, trouble breathing, red urine, nausea or vomiting, other health concerns.     If your symptoms are severe, call [phone].  If you have a Central Venous Catheter:  Notify your doctor if you have had a fever, chills, shaking  or redness, warmth, swelling, drainage at the exit-site.  This could be a sign of infection.    The Apheresis/Blood Donor Center is open Monday-Friday 7:30 a.m. to 5 p.m.  The phone number is [phone].  A Transfusion Medicine physician can be reached after 5:00 p.m. weekdays and on weekends /Holidays by calling [phone], and asking for the physician on call.

## 2017-12-29 RX ORDER — HEPARIN SODIUM 1000 [USP'U]/ML
3 INJECTION, SOLUTION INTRAVENOUS; SUBCUTANEOUS
Status: CANCELLED | OUTPATIENT
Start: 2017-12-29

## 2017-12-29 RX ORDER — ALBUMIN HUMAN 25 %
2500 INTRAVENOUS SOLUTION INTRAVENOUS
Status: CANCELLED | OUTPATIENT
Start: 2017-12-29

## 2017-12-29 RX ORDER — DIPHENHYDRAMINE HYDROCHLORIDE 50 MG/ML
25-50 INJECTION INTRAMUSCULAR; INTRAVENOUS
Status: CANCELLED | OUTPATIENT
Start: 2017-12-29

## 2017-12-29 RX ORDER — CALCIUM GLUCONATE 100 MG/ML
AMPUL (ML) INTRAVENOUS
Status: CANCELLED | OUTPATIENT
Start: 2017-12-29

## 2017-12-30 ENCOUNTER — HOSPITAL ENCOUNTER (OUTPATIENT)
Dept: LAB | Facility: CLINIC | Age: 62
Discharge: HOME | End: 2017-12-30
Attending: INTERNAL MEDICINE | Admitting: INTERNAL MEDICINE
Payer: COMMERCIAL

## 2017-12-30 VITALS
TEMPERATURE: 98.6 F | SYSTOLIC BLOOD PRESSURE: 133 MMHG | HEART RATE: 72 BPM | RESPIRATION RATE: 16 BRPM | DIASTOLIC BLOOD PRESSURE: 73 MMHG

## 2017-12-30 LAB
BASOPHILS # BLD AUTO: 0.1 10E9/L (ref 0–0.2)
BASOPHILS NFR BLD AUTO: 0.8 %
DIFFERENTIAL METHOD BLD: ABNORMAL
EOSINOPHIL # BLD AUTO: 0.1 10E9/L (ref 0–0.7)
EOSINOPHIL NFR BLD AUTO: 1.3 %
ERYTHROCYTE [DISTWIDTH] IN BLOOD BY AUTOMATED COUNT: 13.6 % (ref 10–15)
FIBRINOGEN PPP-MCNC: 169 MG/DL (ref 200–420)
HCT VFR BLD AUTO: 35.2 % (ref 35–47)
HGB BLD-MCNC: 11.5 G/DL (ref 11.7–15.7)
IMM GRANULOCYTES # BLD: 0 10E9/L (ref 0–0.4)
IMM GRANULOCYTES NFR BLD: 0.5 %
INR PPP: 1.02 (ref 0.86–1.14)
LYMPHOCYTES # BLD AUTO: 0.8 10E9/L (ref 0.8–5.3)
LYMPHOCYTES NFR BLD AUTO: 13 %
MCH RBC QN AUTO: 30.4 PG (ref 26.5–33)
MCHC RBC AUTO-ENTMCNC: 32.7 G/DL (ref 31.5–36.5)
MCV RBC AUTO: 93 FL (ref 78–100)
MONOCYTES # BLD AUTO: 0.4 10E9/L (ref 0–1.3)
MONOCYTES NFR BLD AUTO: 5.6 %
NEUTROPHILS # BLD AUTO: 5 10E9/L (ref 1.6–8.3)
NEUTROPHILS NFR BLD AUTO: 78.8 %
NRBC # BLD AUTO: 0 10*3/UL
NRBC BLD AUTO-RTO: 0 /100
PLATELET # BLD AUTO: 168 10E9/L (ref 150–450)
RBC # BLD AUTO: 3.78 10E12/L (ref 3.8–5.2)
WBC # BLD AUTO: 6.3 10E9/L (ref 4–11)

## 2017-12-30 PROCEDURE — P9041 ALBUMIN (HUMAN),5%, 50ML: HCPCS | Mod: ZF | Performed by: PATHOLOGY

## 2017-12-30 PROCEDURE — 85384 FIBRINOGEN ACTIVITY: CPT | Performed by: PATHOLOGY

## 2017-12-30 PROCEDURE — 85025 COMPLETE CBC W/AUTO DIFF WBC: CPT | Performed by: PATHOLOGY

## 2017-12-30 PROCEDURE — 36514 APHERESIS PLASMA: CPT | Mod: ZF

## 2017-12-30 PROCEDURE — 85610 PROTHROMBIN TIME: CPT | Performed by: PATHOLOGY

## 2017-12-30 PROCEDURE — 25000128 H RX IP 250 OP 636: Mod: ZF | Performed by: PATHOLOGY

## 2017-12-30 PROCEDURE — 25000125 ZZHC RX 250: Mod: ZF | Performed by: PATHOLOGY

## 2017-12-30 RX ORDER — HEPARIN SODIUM 1000 [USP'U]/ML
3 INJECTION, SOLUTION INTRAVENOUS; SUBCUTANEOUS
Status: COMPLETED | OUTPATIENT
Start: 2017-12-30 | End: 2017-12-30

## 2017-12-30 RX ORDER — ALBUMIN HUMAN 25 %
2500 INTRAVENOUS SOLUTION INTRAVENOUS
Status: COMPLETED | OUTPATIENT
Start: 2017-12-30 | End: 2017-12-30

## 2017-12-30 RX ORDER — CALCIUM GLUCONATE 100 MG/ML
AMPUL (ML) INTRAVENOUS
Status: COMPLETED | OUTPATIENT
Start: 2017-12-30 | End: 2017-12-30

## 2017-12-30 RX ADMIN — ANTICOAGULANT CITRATE DEXTROSE SOLUTION FORMULA A 510 ML: 12.25; 11; 3.65 SOLUTION INTRAVENOUS at 08:42

## 2017-12-30 RX ADMIN — ALBUMIN (HUMAN) 2500 ML: 12.5 INJECTION, SOLUTION INTRAVENOUS at 08:41

## 2017-12-30 RX ADMIN — CALCIUM GLUCONATE 3 G: 94 INJECTION, SOLUTION INTRAVENOUS at 08:42

## 2017-12-30 RX ADMIN — HEPARIN SODIUM 3000 UNITS: 1000 INJECTION, SOLUTION INTRAVENOUS; SUBCUTANEOUS at 09:53

## 2017-12-30 NOTE — PROCEDURES
Transfusion Medicine  Apheresis Procedure Note    Martina Kim MRN# [id number]   YOB: 1955 Age: 62 year old        Procedure: Therapeutic plasma exchange for myasthenia gravis           Assessment and Plan:   62 year old female presents for Therapeutic plasma exchange. The patient has a history of myasthenia gravis which has responded well to treatment with IVIG.  This is the first time the patient will undergo a series of TPE for an exacerbation of her MG.      The plan is to perform a series of 5 TPE, every other day, using albumin as the replacement fluid.  The patient had been taking an ACE inhibitor.  She stopped taking this at least 24 hours prior to the first TPE, and understands that she should not take another dose until the series of TPE is finished.      Today is procedure #5 of the planned 5 procedures.  She is feeling well today.  She notes of the feeling of fasciculations in her legs were back a little, but over all much improved in her strength, eyelid droop.  She denies nausea, vomiting, fevers, chills, diarrhea.     She will work with her neurologist to come up with a plan for removing the line,  She has felt improved, and likely will work to remove the line up coming.      Please do not start or continue ace-inhibitors throughout the duration of a therapeutic plasma exchange series. Please notify the apheresis physician of any upcoming procedures, surgeries, or biopsies as therapeutic plasma exchange will affect coagulation factors.                History of Present Illness:   62 year old female presents for apheresis for an exacerbation of her myasthenia gravis.  Her past medical history includes a thymectomy for her MG.                  Allergies:        Allergies   Allergen Reactions     Bee Venom Anaphylaxis     Fentanyl And Related Nausea and Vomiting     Intractable vomiting     Wasp Venom Protein Anaphylaxis     Codeine Nausea and Vomiting     Fosamax [Alendronic  Acid]      Other reaction(s): GI Upset  GERD     Morphine Nausea and Vomiting     Morphine Hcl Nausea and Vomiting     Pt states she is allergic to ALL Narcotics.     Oxycodone Nausea and Vomiting     Percocet [Oxycodone-Acetaminophen] Nausea and Vomiting     Phenytoin Nausea and Vomiting     Other reaction(s): GI Upset     Contrast Dye Rash     Other reaction(s): Intolerance-Can't Take  Worsens MG symptoms  Worsens MG symptoms               Medications:      Current Outpatient Prescriptions   Medication     cyclobenzaprine (FLEXERIL) 10 MG tablet     HEALON 10 MG/ML SOLN intra-ocular inj     predniSONE (DELTASONE) 5 MG tablet     clonazePAM (KLONOPIN) 1 MG tablet     carBAMazepine (CARBATROL) 200 MG 12 hr capsule     calcium carbonate (OS-LINDEN 500 MG [place]. [place]) 500 MG tablet     Ascorbic Acid (VITAMIN C PO)     IBUPROFEN PO     bimatoprost (LUMIGAN) 0.01 % SOLN     aspirin 81 MG tablet     Cholecalciferol (VITAMIN D PO)     VITAMIN A PO     VITAMIN E     LANsoprazole (PREVACID) 30 MG capsule     Thiamine HCl (VITAMIN  B-1) 50 MG tablet     fluorometholone (FML LIQUIFILM) 0.1 % ophthalmic susp     spironolactone (ALDACTONE) 100 MG tablet     citalopram (CELEXA) 20 MG tablet     order for DME     Immune globulin (HIZENTRA) 2 GM/10ML SOLN     methylPREDNISolone sodium succinate (SOLU-MEDROL) 125 mg/2 mL injection     amLODIPine-benazepril (LOTREL) 2.5-10 MG per capsule     diphenhydrAMINE (BENADRYL) 50 MG capsule     No current facility-administered medications for this encounter.                 Exam:   /73  Pulse 72  Temp 98.6  F (37  C) (Oral)  Resp 16  Alert, no apparent distress  Breathing appears comfortable on room air  Tunneled central line accessed for the procedure.            Data:           Results for orders placed or performed during the hospital encounter of 12/30/17 (from the past 24 hour(s))   CBC with platelets differential   Result Value Ref Range    WBC 6.3 4.0 - 11.0 10e9/L    RBC Count  3.78 (L) 3.8 - 5.2 10e12/L    Hemoglobin 11.5 (L) 11.7 - 15.7 g/dL    Hematocrit 35.2 35.0 - 47.0 %    MCV 93 78 - 100 fl    MCH 30.4 26.5 - 33.0 pg    MCHC 32.7 31.5 - 36.5 g/dL    RDW 13.6 10.0 - 15.0 %    Platelet Count 168 150 - 450 10e9/L    Diff Method Automated Method     % Neutrophils 78.8 %    % Lymphocytes 13.0 %    % Monocytes 5.6 %    % Eosinophils 1.3 %    % Basophils 0.8 %    % Immature Granulocytes 0.5 %    Nucleated RBCs 0 0 /100    Absolute Neutrophil 5.0 1.6 - 8.3 10e9/L    Absolute Lymphocytes 0.8 0.8 - 5.3 10e9/L    Absolute Monocytes 0.4 0.0 - 1.3 10e9/L    Absolute Eosinophils 0.1 0.0 - 0.7 10e9/L    Absolute Basophils 0.1 0.0 - 0.2 10e9/L    Abs Immature Granulocytes 0.0 0 - 0.4 10e9/L    Absolute Nucleated RBC 0.0    Fibrinogen activity   Result Value Ref Range    Fibrinogen 169 (L) 200 - 420 mg/dL   INR   Result Value Ref Range    INR 1.02 0.86 - 1.14             Procedure Summary:   A single volume therapeutic plasma exchange was performed and 5% albumin was used as the replacement fluid.  A dual lumen central line was used for access and allowed for appropriate flow during the procedure.  ACD-A was used for anticoagulation. To offset the effects of the citrate, calcium gluconate was given in the return line.  The patient's vital signs were stable throughout.  The patient tolerated the procedure well without complication.  See apheresis flowsheet for additional details.    Attestation: During the procedure the patient was directly seen and evaluated by me, Alex Terry MD.    Alex Terry MD  Transfusion Medicine Attending  Laboratory Medicine & Pathology  Pager: [phone]

## 2018-01-03 DIAGNOSIS — G70.00 MYASTHENIA GRAVIS WITHOUT EXACERBATION (H): Primary | ICD-10-CM

## 2018-01-04 ENCOUNTER — RADIANT APPOINTMENT (OUTPATIENT)
Dept: ULTRASOUND IMAGING | Facility: CLINIC | Age: 63
End: 2018-01-04
Attending: PSYCHIATRY & NEUROLOGY
Payer: COMMERCIAL

## 2018-01-04 DIAGNOSIS — G70.00 MYASTHENIA GRAVIS WITHOUT EXACERBATION (H): ICD-10-CM

## 2018-01-04 RX ORDER — LIDOCAINE HYDROCHLORIDE 10 MG/ML
30 INJECTION, SOLUTION EPIDURAL; INFILTRATION; INTRACAUDAL; PERINEURAL ONCE
Status: ACTIVE | OUTPATIENT
Start: 2018-01-04

## 2018-01-04 NOTE — PROGRESS NOTES
Interventional Radiology Brief Post Procedure Note    Procedure: Tunneled central venous catheter removal.    Proceduralist: [facility] JOSHUA    Assistant: None    Time Out: Prior to the start of the procedure and with procedural staff participation, I verbally confirmed the patient s identity using two indicators, relevant allergies, that the procedure was appropriate and matched the consent or emergent situation, and that the correct equipment/implants were available. Immediately prior to starting the procedure I conducted the Time Out with the procedural staff and re-confirmed the patient s name, procedure, and site/side. (The Joint Commission universal protocol was followed.)  Yes    Medications   Medication Event Details Admin User Admin Time       Sedation: None.    Findings: Existing 14.5 Fr., double lumen tunneled central venous catheter removed intact and without difficulty.    Estimated Blood Loss: Minimal    Fluoroscopy Time: None.    SPECIMENS: None    Complications: 1. None     Condition: Stable    Plan: Follow up per primary team. Upright for 2 hours, with no strenuous activity for 3 days.    Comments: See dictated procedure note for full details.    Mrak Cordon PA-C

## 2018-01-04 NOTE — MR AVS SNAPSHOT
MRN:[id number]                      After Visit Summary   1/4/2018    Martina Kim    MRN: [id number]           Visit Information        Provider Department      1/4/2018 8:30 AM [facility] PA;  IMAGING NURSE; [address] [facility]            Review of your medicines          These changes are accurate as of: 1/4/18  8:12 AM.  If you have any questions, ask your nurse or doctor.               CONTINUE these medicines which have NOT CHANGED        Dose / Directions    amLODIPine-benazepril 2.5-10 MG per capsule   Commonly known as:  LOTREL   Used for:  Hypertension        Dose:  1 capsule   Take 1 capsule by mouth daily   Quantity:  90 capsule   Refills:  3       aspirin 81 MG tablet        Take  by mouth daily. 1 Tablet Daily   Refills:  0       calcium carbonate 1250 MG tablet   Commonly known as:  OS-LINDEN 500 mg [place]   Used for:  SOB (shortness of breath)        Dose:  500 mg   Take 500 mg by mouth 2 times daily   Refills:  0       carBAMazepine 200 MG 12 hr capsule   Commonly known as:  CARBATROL   Used for:  Fasciculation        Dose:  200 mg   Take 1 capsule (200 mg) by mouth 2 times daily   Quantity:  120 capsule   Refills:  5       citalopram 20 MG tablet   Commonly known as:  celeXA   Used for:  Myasthenia gravis without exacerbation (H)        Dose:  20 mg   Take 1 tablet (20 mg) by mouth daily   Quantity:  30 tablet   Refills:  5       clonazePAM 1 MG tablet   Commonly known as:  klonoPIN   Used for:  Myasthenia gravis without exacerbation (H)        Take 1-2 tablets at bedtime as needed for sleep   Quantity:  60 tablet   Refills:  3       cyclobenzaprine 10 MG tablet   Commonly known as:  FLEXERIL   Used for:  Fasciculation, Myasthenia gravis without exacerbation (H)        TAKE ONE TABLET BY MOUTH AT BEDTIME AS NEEDED FOR FOR MUSCLE SPASM   Quantity:  30 tablet   Refills:  1       diphenhydrAMINE 50 MG capsule   Commonly known as:  BENADRYL        Dose:  50 mg    Take 50 mg by mouth every 6 hours as needed Every 4 hours during infusion   Refills:  0       fluorometholone 0.1 % ophthalmic susp   Commonly known as:  FML LIQUIFILM        Dose:  1 drop   Apply 1 drop to eye 3 times daily   Refills:  1       HEALON 10 MG/ML Soln intra-ocular inj   Generic drug:  hyaluronate        Dose:  1 Application   Apply 1 Application topically 4 times daily   Refills:  3       IBUPROFEN PO        Dose:  800 mg   Take 800 mg by mouth every 8 hours as needed   Refills:  0       Immune globulin 2 GM/10ML Soln   Commonly known as:  HIZENTRA   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   Refills:  0       order for [facility]        [employer] AutoBiPAP 11/6 cm, ComfortGel Blue Pte nasal mask.   Refills:  0       predniSONE 5 MG tablet   Commonly known as:  DELTASONE   Used for:  Myasthenia gravis without exacerbation (H)        Dose:  17.5 mg   Take 3.5 tablets (17.5 mg) by mouth daily   Quantity:  105 tablet   Refills:  3       PREVACID 30 MG CR capsule   Generic drug:  LANsoprazole        Dose:  30 mg   Take 30 mg by mouth daily 1 Tablet Daily   Refills:  0       SOLU-MEDROL 125 mg/2 mL injection   Generic drug:  methylPREDNISolone sodium succinate        Dose:  125 mg   Inject 125 mg into the vein once   Refills:  0       spironolactone 100 MG tablet   Commonly known as:  ALDACTONE   Used for:  MG with exacerbation (myasthenia gravis) (H)        Dose:  100 mg   Take 1 tablet (100 mg) by mouth daily Before infusion   Quantity:  5 tablet   Refills:  3       vitamin  B-1 50 MG tablet        Dose:  50 mg   Take 50 mg by mouth daily   Refills:  0       VITAMIN A PO        Dose:  8000 Units   Take 8,000  Units by mouth daily 1 Tablet Daily   Refills:  0       VITAMIN C PO        Dose:  1000 mg   Take 1,000 mg by mouth daily   Refills:  0       VITAMIN D PO        Dose:  5000 Units   Take 5,000 Units by mouth daily 1 Tablet Daily   Refills:  0       VITAMIN E        Dose:  400 Units   400 Units daily 1 Tablet Daily   Refills:  0                Protect others around you: Learn how to safely use, store and throw away your medicines at www.disposemymeds.org.         Follow-ups after your visit        Your next 10 appointments already scheduled     Jan 04, 2018  8:30 AM CST   (Arrive by 8:15 AM)   IR CVC TUNNEL REMOVAL LEFT with ANGELINEUS4, ANGELINE IMAGING NURSE, ANGELINE IMAGING [facility] ([facility] [place])    [address]  1st Floor  [facility] [address] [phone]           1. Your doctor will need to do a history and physical within 7 days before this procedure. 2. Your doctor will which medications should not be taken the morning of the exam. 3. Laboratory tests are to be obtained by your doctor prior to the exam (Basic Metabolic Panel, CBCP, PTT and INR) (No labs needed if you are having a tunneled catheter exchange or removal) 4. If you have allergies to x-ray contrast or iodine, contact your doctor or a Radiology nurse prior to the exam day for instructions. 5. Someone will need to drive you to and from the hospital. 6. If you are or may be pregnant, contact your doctor or a Radiology nurse prior to the day of the exam. 7. If you have diabetes, check with your doctor or a Radiology nurse to see if your insulin needs to be adjusted for the exam. 8. If you are taking a medication called Glucophage or Glucovance; these medications need to be held the day of the exam and for approximately 48 hours following. A blood sample must be drawn so your creatinine level can be checked before resuming this medication. 9. If you are taking Coumadin (to thin you blood) please contact  your doctor or a Radiology nurse at least 3 days before the exam for special instructions. 10. You should not have received contrast within 48 hours of this exam. 11. The day before your exam you may eat your regular diet and are encouraged to drink at least 2 quarts of clear liquids. Drink no alcoholic beverages for 24 hours prior to the exam. 12. If you have a colostomy you will need to irrigate it with tap water at 8PM the evening before and again at 6AM the morning of the exam. 13. Do not smoke for 24 hours prior to the procedure. 14. Birth to 4 years: - Breast feeding must be stopped 4 hours prior to exam - Solid food or formula must be stopped 6 hours prior to exam - Tube feedings must be stopped 6 hours prior to exam 15. 4-10 years old: - Nothing to eat or drink 6 hours prior to exam 16. 10+ years old: - Nothing to eat or drink 8 hours prior to exam 17. The morning of the exam you may brush your teeth and take medications as directed with a sip of water. 18. When discharged, you cannot drive until morning, and an adult must be with you until then. You should stay in the [facility] overnight. 19. Bring a list of all drugs you are taking; include supplements and over-the-counter medications. Wear comfortable clothes and leave your valuables at home.            Feb 19, 2018  8:00 AM CST   (Arrive by 7:45 AM)   Return Myasthenia Gravis with Frederick Vitale MD   [facility] ([facility])    [address] [place] [address] [phone]               Care Instructions        Further instructions from your care team         A collaboration between [facility] and [facility]  Experts in minimally invasive, targeted treatments performed using imaging guidance    Venous Access Device, Port Catheter or Tunneled Central Line Removal    Today you had your existing venous access device removed,  either because it was no longer needed or because there was malfunction or infection issues.    One of our Radiology PAs performed this procedure for you today:  ? Mark Cordon, [facility], PA-C  ? SHELLIE Bowling, PA-C  ? Maykel Montes PA-C  ? Ronit Calderon MS, JOSHUA  ? Werner Martins PA-C    After you go home:  - Drink plenty of fluids.  Generally 6-8 (8 ounce) glasses a day is recommended.  - Resume your regular diet unless otherwise ordered by a medical provider.  - Keep any applied tape/gauze dressings clean and dry.  Change tape/gauze dressings if they get wet or soiled.  - You may shower the following day after procedure, however cover and protect from moisture any tape/gauze dressings.  You may let water hit and run over dried skin glue, but do not scrub.  Pat the area dry after showering.  - Port removal incisions are closed with absorbable suture, meaning they do not need to be removed at a later date, and a topical skin adhesive (skin glue).  This glue will wear off in 7-14 days.  Do not remove before this time.  If 14 days have passed and residual glue is present, you may gently remove it.  - You may remove tape/gauze dressings after 5 days if the site looks closed and in the process of healing.  - Do not apply gels, lotions, or ointments to the glue site for the first 10 days as this may cause the glue to prematurely soften and fail.  - Do not perform strenuous activities or lift greater than 10 pounds for the next three days.  - If there is bleeding or oozing from the procedure site, apply firm pressure to the area for 5-10 minutes.  If the bleeding continues seek medical advice at the numbers below.  - Mild procedure site discomfort can be treated with an ice pack and over-the-counter pain relievers.              For 24 hours after any sedation used:  - Relax and take it easy.  No strenuous activities.  - Do not drive or operate machines at home or at work.  - No alcohol consumption.  - Do not make  any important or legal decisions.    Call our Interventional Radiology (IR) service if:  - If you start bleeding from the procedure site.  If you do start to bleed from the site, lie down and hold some pressure on the site.  Our radiology provider can help you decide if you need to return to the hospital.  - If you have new or worsening pain related to the procedure.  - If you have concerning swelling at the procedure site.  - If you develop persistent nausea or vomiting.  - If you develop hives or a rash or any unexplained itching.  - If you have a fever of greater than 100.5  F and chills in the first 5 days after procedure.  - Any other concerns related to your procedure.      [facility]  Interventional Radiology (IR)  [address]  [address] [facility] [place] [address]    Contact Number:  [phone]  (IR control desk)  - Monday - Friday 8:00 am - 4:30 pm    After hours for urgent concerns:  [phone]  - After 4:30 pm Monday - Friday, Weekends and Holidays.   - Ask for Interventional Radiology on-call.  Someone is available 24 hours a day.  - [facility] toll free number:  [phone]         Additional Information About Your Visit        [employer] Information     [employer] gives you secure access to your electronic health record. If you see a primary care provider, you can also send messages to your care team and make appointments. If you have questions, please call your primary care clinic.  If you do not have a primary care provider, please call [phone] and they will assist you.      [employer] is an electronic gateway that provides easy, online access to your medical records. With [employer], you can request a clinic appointment, read your test results, renew a prescription or communicate with your care team.     To access your existing account, please contact your [facility] or call [phone] for assistance.        Care EveryWhere  ID     This is your Care EveryWhere ID. This could be used by other organizations to access your [facility] medical records  [id number]         Primary Care Provider Office Phone # Fax #    Ivan Marie [phone]      Equal Access to Services     JOHNYCONCHITA ANISHA : Hadii anand vyas hadvikao Soomaali, waaxda luqadaha, qaybta kaalmada adeegyada, tristen leavitt fritzoneal funes jaronaraseli germain . So [facility] [phone].    ATENCIÓN: Si habla español, tiene a mann disposición servicios gratuitos de asistencia lingüística. Llame al [phone].    We comply with applicable federal civil rights laws and [place] laws. We do not discriminate on the basis of race, color, national origin, age, disability, sex, sexual orientation, or gender identity.            Thank you!     Thank you for choosing [facility] for your care. Our goal is always to provide you with excellent care. Hearing back from our patients is one way we can continue to improve our services. Please take a few minutes to complete the written survey that you may receive in the mail after you visit with us. Thank you!             Medication List: This is a list of all your medications and when to take them. Check marks below indicate your daily home schedule. Keep this list as a reference.          This list is accurate as of: 1/4/18  8:12 AM.  Always use your most recent med list.               Medications           Morning Afternoon Evening Bedtime As Needed    amLODIPine-benazepril 2.5-10 MG per capsule   Commonly known as:  LOTREL   Take 1 capsule by mouth daily                                aspirin 81 MG tablet   Take  by mouth daily. 1 Tablet Daily                                calcium carbonate 1250 MG tablet   Commonly known as:  OS-LINDEN 500 mg [place]. Ca   Take 500 mg by mouth 2 times daily                                carBAMazepine 200 MG 12 hr capsule   Commonly known as:  CARBATROL   Take 1 capsule (200 mg) by mouth 2 times daily                                 citalopram 20 MG tablet   Commonly known as:  celeXA   Take 1 tablet (20 mg) by mouth daily                                clonazePAM 1 MG tablet   Commonly known as:  klonoPIN   Take 1-2 tablets at bedtime as needed for sleep                                cyclobenzaprine 10 MG tablet   Commonly known as:  FLEXERIL   TAKE ONE TABLET BY MOUTH AT BEDTIME AS NEEDED FOR FOR MUSCLE SPASM                                diphenhydrAMINE 50 MG capsule   Commonly known as:  BENADRYL   Take 50 mg by mouth every 6 hours as needed Every 4 hours during infusion                                fluorometholone 0.1 % ophthalmic susp   Commonly known as:  FML LIQUIFILM   Apply 1 drop to eye 3 times daily                                HEALON 10 MG/ML Soln intra-ocular inj   Apply 1 Application topically 4 times daily   Generic drug:  hyaluronate                                IBUPROFEN PO   Take 800 mg by mouth every 8 hours as needed                                Immune globulin 2 GM/10ML Soln   Commonly known as:  HIZENTRA   Inject 13 g Subcutaneous once a week                                LUMIGAN 0.01 % Soln   Place 1 drop into both eyes daily.   Generic drug:  bimatoprost                                order for [facility]   [employer] Dream Station AutoBiPAP 11/6 cm, ComfortGel Blue Pte nasal mask.                                predniSONE 5 MG tablet   Commonly known as:  DELTASONE   Take 3.5 tablets (17.5 mg) by mouth daily                                PREVACID 30 MG CR capsule   Take 30 mg by mouth daily 1 Tablet Daily   Generic drug:  LANsoprazole                                SOLU-MEDROL 125 mg/2 mL injection   Inject 125 mg into the vein once   Generic drug:  methylPREDNISolone sodium succinate                                spironolactone 100 MG tablet   Commonly known as:  ALDACTONE   Take 1 tablet (100 mg) by mouth daily Before infusion                                vitamin  B-1 50 MG tablet   Take 50  mg by mouth daily                                VITAMIN A PO   Take 8,000 Units by mouth daily 1 Tablet Daily                                VITAMIN C PO   Take 1,000 mg by mouth daily                                VITAMIN D PO   Take 5,000 Units by mouth daily 1 Tablet Daily                                VITAMIN E   400 Units daily 1 Tablet Daily

## 2018-01-04 NOTE — DISCHARGE INSTRUCTIONS
A collaboration between [facility] and [facility]  Experts in minimally invasive, targeted treatments performed using imaging guidance    Venous Access Device, Port Catheter or Tunneled Central Line Removal    Today you had your existing venous access device removed, either because it was no longer needed or because there was malfunction or infection issues.    One of our Radiology PAs performed this procedure for you today:  ? Mark Cordon, [facility], PAAnniaC  ? SHELLIE Bowling, PA-C  ? Maykel Montes PA-C  ? Ronit Calderon MS, PA-C  ? Werner Martins PA-C    After you go home:  - Drink plenty of fluids.  Generally 6-8 (8 ounce) glasses a day is recommended.  - Resume your regular diet unless otherwise ordered by a medical provider.  - Keep any applied tape/gauze dressings clean and dry.  Change tape/gauze dressings if they get wet or soiled.  - You may shower the following day after procedure, however cover and protect from moisture any tape/gauze dressings.  You may let water hit and run over dried skin glue, but do not scrub.  Pat the area dry after showering.  - Port removal incisions are closed with absorbable suture, meaning they do not need to be removed at a later date, and a topical skin adhesive (skin glue).  This glue will wear off in 7-14 days.  Do not remove before this time.  If 14 days have passed and residual glue is present, you may gently remove it.  - You may remove tape/gauze dressings after 5 days if the site looks closed and in the process of healing.  - Do not apply gels, lotions, or ointments to the glue site for the first 10 days as this may cause the glue to prematurely soften and fail.  - Do not perform strenuous activities or lift greater than 10 pounds for the next three days.  - If there is bleeding or oozing from the procedure site, apply firm pressure to the area for 5-10 minutes.  If the bleeding continues seek medical advice at the  numbers below.  - Mild procedure site discomfort can be treated with an ice pack and over-the-counter pain relievers.              For 24 hours after any sedation used:  - Relax and take it easy.  No strenuous activities.  - Do not drive or operate machines at home or at work.  - No alcohol consumption.  - Do not make any important or legal decisions.    Call our Interventional Radiology (IR) service if:  - If you start bleeding from the procedure site.  If you do start to bleed from the site, lie down and hold some pressure on the site.  Our radiology provider can help you decide if you need to return to the hospital.  - If you have new or worsening pain related to the procedure.  - If you have concerning swelling at the procedure site.  - If you develop persistent nausea or vomiting.  - If you develop hives or a rash or any unexplained itching.  - If you have a fever of greater than 100.5  F and chills in the first 5 days after procedure.  - Any other concerns related to your procedure.      [facility]  Interventional Radiology (IR)  [address] [place] [address]    Contact Number:  [phone]  (IR control desk)  - Monday - Friday 8:00 am - 4:30 pm    After hours for urgent concerns:  [phone]  - After 4:30 pm Monday - Friday, Weekends and Holidays.   - Ask for Interventional Radiology on-call.  Someone is available 24 hours a day.  - [facility] toll free number:  [phone]

## 2018-01-08 ENCOUNTER — TELEPHONE (OUTPATIENT)
Dept: NEUROLOGY | Facility: CLINIC | Age: 63
End: 2018-01-08

## 2018-01-08 NOTE — TELEPHONE ENCOUNTER
Prior Authorization Specialty Medication Request    Medication/Dose: Hizentra 13 g  Diagnosis and ICD: Myasthenia Gravis G70.01  New/Renewal/Insurance Change PA: Renewal    Important Lab Values: na    Previously Tried and Failed Therapies: na    Rationale: Continuation of IVIG therapy. Patient is stable on Hizentra. Please approve.     Would you like to include any research articles? na   If yes please include the hyperlink(s) below or fax @ [phone].    (Include Name and MRN)    If you received a fax notification from an outside Pharmacy;  Pharmacy Name:ed  Pharmacy #:  Pharmacy Fax:na

## 2018-01-10 NOTE — TELEPHONE ENCOUNTER
[facility] Prior Authorization Team   Phone: [phone]  Fax: [phone]    PA Initiation    Medication: Hizentra  Insurance Company: [employer] Clinical Review - Phone [phone] Fax [phone]  Pharmacy Filling the Rx: [id number] - [place] - [address] [facility]  Filling Pharmacy Phone: [phone]  Filling Pharmacy Fax: [phone]  Start Date: 1/10/2018

## 2018-01-11 NOTE — TELEPHONE ENCOUNTER
[facility] Prior Authorization Team   Phone: [phone]  Fax: [phone]    Prior Authorization Approval    Authorization Effective Date: 1/10/2018  Authorization Expiration Date: 1/9/2019  Medication: Hizentra - PA approved  Approved Dose/Quantity: 65 units  Reference #: (Key: [id number])   Insurance Company: [employer] Clinical Review - Phone [phone] Fax [phone]  Expected CoPay: NA     CoPay Card Available: No   Foundation Assistance Needed: NA  Which Pharmacy is filling the prescription (Not needed for infusion/clinic administered): [id number] - [place], [place] - [address]  Pharmacy Notified: Yes  Patient Notified: Yes

## 2018-01-23 DIAGNOSIS — G70.00 MYASTHENIA GRAVIS WITHOUT EXACERBATION (H): ICD-10-CM

## 2018-01-23 RX ORDER — PREDNISONE 5 MG/1
TABLET ORAL
Qty: 105 TABLET | Refills: 3 | Status: SHIPPED | OUTPATIENT
Start: 2018-01-23 | End: 2018-02-19

## 2018-02-10 DIAGNOSIS — G70.00 MYASTHENIA GRAVIS WITHOUT EXACERBATION (H): ICD-10-CM

## 2018-02-10 DIAGNOSIS — R25.3 FASCICULATION: ICD-10-CM

## 2018-02-12 RX ORDER — CITALOPRAM HYDROBROMIDE 20 MG/1
TABLET ORAL
Qty: 30 TABLET | Refills: 2 | Status: SHIPPED | OUTPATIENT
Start: 2018-02-12 | End: 2018-12-03

## 2018-02-12 RX ORDER — CARBAMAZEPINE 200 MG/1
CAPSULE, EXTENDED RELEASE ORAL
Qty: 180 CAPSULE | Refills: 0 | Status: ON HOLD | OUTPATIENT
Start: 2018-02-12 | End: 2018-07-09

## 2018-02-12 ASSESSMENT — ENCOUNTER SYMPTOMS
LEG PAIN: 0
FATIGUE: 0
BRUISES/BLEEDS EASILY: 1
POLYDIPSIA: 0
DIFFICULTY URINATING: 0
HEMATURIA: 0
HALLUCINATIONS: 0
NECK PAIN: 1
DOUBLE VISION: 0
TINGLING: 1
HEADACHES: 0
ARTHRALGIAS: 0
ALTERED TEMPERATURE REGULATION: 1
FLANK PAIN: 0
INCREASED ENERGY: 0
NERVOUS/ANXIOUS: 1
SLEEP DISTURBANCES DUE TO BREATHING: 0
FEVER: 0
NIGHT SWEATS: 1
HYPERTENSION: 1
WEIGHT LOSS: 0
MYALGIAS: 1
SWOLLEN GLANDS: 0
DECREASED APPETITE: 0
PARALYSIS: 0
INSOMNIA: 1
EYE WATERING: 0
POLYPHAGIA: 0
CHILLS: 0
SYNCOPE: 0
MEMORY LOSS: 0
DIZZINESS: 0
DYSURIA: 0
MUSCLE CRAMPS: 1
WEIGHT GAIN: 0
HYPOTENSION: 0
EYE IRRITATION: 1
DISTURBANCES IN COORDINATION: 0
LIGHT-HEADEDNESS: 0
WEAKNESS: 0
ORTHOPNEA: 0
EXERCISE INTOLERANCE: 0
JOINT SWELLING: 0
EYE PAIN: 0
BACK PAIN: 1
STIFFNESS: 0
EYE REDNESS: 1
NUMBNESS: 1
MUSCLE WEAKNESS: 0
PANIC: 0
PALPITATIONS: 0
LOSS OF CONSCIOUSNESS: 0
DEPRESSION: 0
DECREASED CONCENTRATION: 0
TREMORS: 0
SPEECH CHANGE: 0
SEIZURES: 0

## 2018-02-19 ENCOUNTER — MEDICAL CORRESPONDENCE (OUTPATIENT)
Dept: HEALTH INFORMATION MANAGEMENT | Facility: CLINIC | Age: 63
End: 2018-02-19

## 2018-02-19 ENCOUNTER — OFFICE VISIT (OUTPATIENT)
Dept: NEUROLOGY | Facility: CLINIC | Age: 63
End: 2018-02-19
Payer: COMMERCIAL

## 2018-02-19 VITALS
WEIGHT: 169.3 LBS | HEIGHT: 64 IN | DIASTOLIC BLOOD PRESSURE: 85 MMHG | BODY MASS INDEX: 28.9 KG/M2 | HEART RATE: 72 BPM | SYSTOLIC BLOOD PRESSURE: 156 MMHG

## 2018-02-19 DIAGNOSIS — G70.00 MYASTHENIA GRAVIS WITHOUT EXACERBATION (H): Primary | ICD-10-CM

## 2018-02-19 RX ORDER — MYCOPHENOLATE MOFETIL 500 MG/1
1000 TABLET, FILM COATED ORAL 2 TIMES DAILY
Qty: 120 TABLET | Refills: 3 | Status: SHIPPED | OUTPATIENT
Start: 2018-02-19 | End: 2019-07-15

## 2018-02-19 RX ORDER — PREDNISONE 5 MG/1
TABLET ORAL
Qty: 105 TABLET | Refills: 3 | Status: SHIPPED | OUTPATIENT
Start: 2018-02-19 | End: 2018-11-12

## 2018-02-19 NOTE — MR AVS SNAPSHOT
After Visit Summary   2/19/2018    Martina Kim    MRN: [id number]           Patient Information     Date Of Birth          1955        Visit Information        Provider Department      2/19/2018 8:00 AM Frederick Vitale MD [facility]        Today's Diagnoses     Myasthenia gravis without exacerbation (H)    -  1       Follow-ups after your visit        Follow-up notes from your care team     Return in about 4 months (around 6/19/2018).      Your next 10 appointments already scheduled     Feb 19, 2018  9:00 AM CST   PFT VISIT with  PFL KONSTANTIN   [facility] ([facility])    [address] [place] [id number] [phone]            Aug 06, 2018  2:30 PM CDT   (Arrive by 2:15 PM)   Return Myasthenia Gravis with Frederick Vitale MD   [facility] ([facility])    [address] [place] [id number] [phone]              Future tests that were ordered for you today     Open Future Orders        Priority Expected Expires Ordered    PFT General Lab Testing Routine 2/19/2018 2/16/2019 2/16/2018            Who to contact     Please call your clinic at [phone] to:    Ask questions about your health    Make or cancel appointments    Discuss your medicines    Learn about your test results    Speak to your doctor            Additional Information About Your Visit        [employer] Information     [employer] gives you secure access to your electronic health record. If you see a primary care provider, you can also send messages to your care team and make appointments. If you have questions, please call your primary care clinic.  If you do not have a primary care provider, please call [phone] and they will assist you.      [employer] is an electronic gateway that provides easy, online access to your medical records. With [employer], you can request a  "clinic appointment, read your test results, renew a prescription or communicate with your care team.     To access your existing account, please contact your [facility] or call [phone] for assistance.        Care EveryWhere ID     This is your Care EveryWhere ID. This could be used by other organizations to access your [facility] medical records  [id number]        Your Vitals Were     Pulse Height BMI (Body Mass Index)             72 1.626 m (5' 4\") 29.06 kg/m2          Blood Pressure from Last 3 Encounters:   02/19/18 156/85   12/30/17 133/73   12/28/17 125/69    Weight from Last 3 Encounters:   02/19/18 76.8 kg (169 lb 4.8 oz)   12/28/17 75.9 kg (167 lb 5.3 oz)   12/26/17 76.8 kg (169 lb 4.8 oz)                 Today's Medication Changes          These changes are accurate as of 2/19/18  8:55 AM.  If you have any questions, ask your nurse or doctor.               Start taking these medicines.        Dose/Directions    mycophenolate 500 MG tablet   Used for:  Myasthenia gravis without exacerbation (H)   Started by:  Frederick Vitale MD        Dose:  1000 mg   Take 2 tablets (1,000 mg) by mouth 2 times daily   Quantity:  120 tablet   Refills:  3         These medicines have changed or have updated prescriptions.        Dose/Directions    predniSONE 5 MG tablet   Commonly known as:  DELTASONE   This may have changed:  See the new instructions.   Used for:  Myasthenia gravis without exacerbation (H)   Changed by:  Frederick Vitale MD        12.5mg daily   Quantity:  105 tablet   Refills:  3            Where to get your medicines      These medications were sent to [id number] - [place] - [address]  [address] [address]     Phone:  [phone]     mycophenolate 500 MG tablet    predniSONE 5 MG tablet                Primary Care Provider Office Phone # Fax #    Ivan Marie [phone]       [place] [id number] " [address] [address]        Equal Access to Services     JOHNYCONCHITA ANISHA : Hadii anand ku hadvikao Sorobbieali, waaxda luqadaha, qaybta kamarkda emileemadeline, tristen leavitt fritzoneal carrascowarrenaraseli kohli. So [facility] [phone].    ATENCIÓN: Si habla español, tiene a mann disposición servicios gratuitos de asistencia lingüística. Llame al [phone].    We comply with applicable federal civil rights laws and [place] laws. We do not discriminate on the basis of race, color, national origin, age, disability, sex, sexual orientation, or gender identity.            Thank you!     Thank you for choosing [facility] NEUROLOGY  for your care. Our goal is always to provide you with excellent care. Hearing back from our patients is one way we can continue to improve our services. Please take a few minutes to complete the written survey that you may receive in the mail after your visit with us. Thank you!             Your Updated Medication List - Protect others around you: Learn how to safely use, store and throw away your medicines at www.disposemymeds.org.          This list is accurate as of 2/19/18  8:55 AM.  Always use your most recent med list.                   Brand Name Dispense Instructions for use Diagnosis    amLODIPine-benazepril 2.5-10 MG per capsule    LOTREL    90 capsule    Take 1 capsule by mouth daily    Hypertension       aspirin 81 MG tablet      Take  by mouth daily. 1 Tablet Daily        calcium carbonate 1250 MG tablet    OS-LINDEN 500 mg [place]. [place]     Take 500 mg by mouth 2 times daily    SOB (shortness of breath)       * carBAMazepine 200 MG 12 hr capsule    CARBATROL    120 capsule    Take 1 capsule (200 mg) by mouth 2 times daily    Fasciculation       * carBAMazepine 200 MG 12 hr capsule    CARBATROL    180 capsule    TAKE ONE CAPSULE BY MOUTH TWICE A DAY    Fasciculation       citalopram 20 MG tablet    celeXA    30 tablet    TAKE ONE TABLET BY MOUTH ONCE DAILY    Myasthenia gravis without  exacerbation (H)       clonazePAM 1 MG tablet    klonoPIN    60 tablet    Take 1-2 tablets at bedtime as needed for sleep    Myasthenia gravis without exacerbation (H)       cyclobenzaprine 10 MG tablet    FLEXERIL    30 tablet    TAKE ONE TABLET BY MOUTH AT BEDTIME AS NEEDED FOR FOR MUSCLE SPASM    Fasciculation, Myasthenia gravis without exacerbation (H)       diphenhydrAMINE 50 MG capsule    BENADRYL     Take 50 mg by mouth every 6 hours as needed Every 4 hours during infusion        fluorometholone 0.1 % ophthalmic susp    FML LIQUIFILM     Apply 1 drop to eye 3 times daily        HEALON 10 MG/ML Soln intra-ocular inj   Generic drug:  hyaluronate      Apply 1 Application topically 4 times daily        IBUPROFEN PO      Take 800 mg by mouth every 8 hours as needed        LUMIGAN 0.01 % Soln   Generic drug:  bimatoprost      Place 1 drop into both eyes daily.        mycophenolate 500 MG tablet     120 tablet    Take 2 tablets (1,000 mg) by mouth 2 times daily    Myasthenia gravis without exacerbation (H)       [place] [facility]      [facility] [employer] 11/6 cm, [employer] Blue Pte nasal mask.        predniSONE 5 MG tablet    DELTASONE    105 tablet    12.5mg daily    Myasthenia gravis without exacerbation (H)       PREVACID 30 MG CR capsule   Generic drug:  LANsoprazole      Take 30 mg by mouth daily 1 Tablet Daily        SOLU-MEDROL 125 mg/2 mL injection   Generic drug:  methylPREDNISolone sodium succinate      Inject 125 mg into the vein once        spironolactone 100 MG tablet    ALDACTONE    5 tablet    Take 1 tablet (100 mg) by mouth daily Before infusion    MG with exacerbation (myasthenia gravis) (H)       vitamin  B-1 50 MG tablet      Take 50 mg by mouth daily        VITAMIN A PO      Take 8,000 Units by mouth daily 1 Tablet Daily        VITAMIN C PO      Take 1,000 mg by mouth daily        VITAMIN D PO      Take 5,000 Units by mouth daily 1 Tablet Daily        VITAMIN E      400 Units daily 1  Tablet Daily        * Notice:  This list has 2 medication(s) that are the same as other medications prescribed for you. Read the directions carefully, and ask your doctor or other care provider to review them with you.

## 2018-02-19 NOTE — PROGRESS NOTES
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  Her condition markedly improved after treatment with plasmapheresis this was her first ever treatment with plasmapheresis.     Plan:      -Reduce carbamazepin to 200 mg once a day at pm  -No Hizentra and No IVIG.  -Start Cellcept 1000 mg twice  - Follow up in 4 months    In all, I have spent at least 15 minutes with more than half of overall time in counseling and coordinating care.    Sincerely,    Frederick Vitale MD                Interval History:   Mrs. Fuentes returned to the [facility] myasthenia gravis clinic February 19, 2018.  She was last seen in November of the last year and presented with exacerbation of myasthenia gravis.  At that time she had significant fatigue shortness of breath and ocular symptoms.  She was first tried on increased dose of prednisone but showed no significant improvement.  She was then was given a course of plasmapheresis with the last treatment on December 30.  She noticed the change after the 3d treatment. She felt increasingly better. She reports on markedly improved stamina. 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 She reports on improvement with mental fatigues as well.  She continues having some fasciculation especially involving her left eye.  However, she does report on significant improvement in muscle twitching is significantly reduced after plasmapheresis.             Review of Systems:   Answers for HPI/ROS submitted by the patient on 2/12/2018   General Symptoms: Yes  Skin Symptoms: No  HENT Symptoms: No  EYE SYMPTOMS: Yes  HEART SYMPTOMS: Yes  LUNG SYMPTOMS: No  INTESTINAL  SYMPTOMS: No  URINARY SYMPTOMS: Yes  GYNECOLOGIC SYMPTOMS: No  BREAST SYMPTOMS: No  SKELETAL SYMPTOMS: Yes  BLOOD SYMPTOMS: Yes  NERVOUS SYSTEM SYMPTOMS: Yes  MENTAL HEALTH SYMPTOMS: Yes  Fever: No  Loss of appetite: No  Weight loss: No  Weight gain: No  Fatigue: No  Night sweats: Yes  Chills: No  Increased stress: No  Excessive hunger: No  Excessive thirst: No  Feeling hot or cold when others believe the temperature is normal: Yes  Loss of height: No  Post-operative complications: No  Surgical site pain: No  Hallucinations: No  Change in or Loss of Energy: No  Hyperactivity: No  Confusion: No  Eye pain: No  Vision loss: No  Dry eyes: Yes  Watery eyes: No  Eye bulging: No  Double vision: No  Flashing of lights: No  Spots: Yes  Floaters: Yes  Redness: Yes  Crossed eyes: No  Tunnel Vision: No  Yellowing of eyes: No  Eye irritation: Yes  Chest pain or pressure: No  Fast or irregular heartbeat: No  Pain in legs with walking: No  Trouble breathing while lying down: No  Fingers or toes appear blue: No  High blood pressure: Yes  Low blood pressure: No  Fainting: No  Murmurs: No  Pacemaker: No  Varicose veins: No  Edema or swelling: No  Wake up at night with shortness of breath: No  Light-headedness: No  Exercise intolerance: No  Trouble holding urine or incontinence: Yes  Pain or burning: No  Trouble starting or stopping: Yes  Increased frequency of urination: No  Blood in urine: No  Decreased frequency of urination: No  Frequent nighttime urination: No  Flank pain: No  Difficulty emptying bladder: No  Back pain: Yes  Muscle aches: Yes  Neck pain: Yes  Swollen joints: No  Joint pain: No  Bone pain: No  Muscle cramps: Yes  Muscle weakness: No  Joint stiffness: No  Bone fracture: No  Anemia: No  Swollen glands: No  Easy bleeding or bruising: Yes  Trouble with coordination: No  Dizziness or trouble with balance: No  Fainting or black-out spells: No  Memory loss: No  Headache: No  Seizures: No  Speech problems: No  Tingling:  Yes  Tremor: No  Weakness: No  Difficulty walking: No  Paralysis: No  Numbness: Yes  Nervous or Anxious: Yes  Depression: No  Trouble sleeping: Yes  Trouble thinking or concentrating: No  Mood changes: No  Panic attacks: No            Medications:     Current Outpatient Prescriptions Ordered in Epic   Medication     carBAMazepine (CARBATROL) 200 MG 12 hr capsule twice a day     citalopram (CELEXA) 20 MG tablet  0.5 tab at bed time     predniSONE (DELTASONE) 5 MG tablet  12.5 mg daily     cyclobenzaprine (FLEXERIL) 10 MG tablet as needed 2 tabs     Thiamine HCl (VITAMIN  B-1) 50 MG tablet     fluorometholone (FML LIQUIFILM) 0.1 % ophthalmic susp     HEALON 10 MG/ML SOLN intra-ocular inj     spironolactone (ALDACTONE) 100 MG tablet      clonazePAM (KLONOPIN) 1 MG tablet as needed at bed time     order for DME     calcium carbonate (OS-LINDEN 500 MG [place]. [place]) 500 MG tablet     Ascorbic Acid (VITAMIN C PO)     methylPREDNISolone sodium succinate (SOLU-MEDROL) 125 mg/2 mL injection     IBUPROFEN PO     amLODIPine-benazepril (LOTREL) 2.5-10 MG per capsule     bimatoprost (LUMIGAN) 0.01 % SOLN     diphenhydrAMINE (BENADRYL) 50 MG capsule     aspirin 81 MG tablet     Cholecalciferol (VITAMIN D PO)     VITAMIN A PO     VITAMIN E     LANsoprazole (PREVACID) 30 MG capsule     Current Facility-Administered Medications Ordered in Epic   Medication Dose Route Frequency Last Rate Last Dose     lidocaine (PF) (XYLOCAINE) 1 % injection 300 mg  30 mL Subcutaneous Once                 Physical Exam:       BP: 156/85 Pulse: 72            Constitutional:   awake, alert, cooperative, no apparent distress, and appears stated age     Neurologic:   Awake, alert, oriented to name, place and time.  Cranial nerves II-XII are grossly intact.  Motor is 5 out of 5 bilaterally.

## 2018-02-19 NOTE — LETTER
2/19/2018       RE: Martina Kim  [address]  [place] [address]     Dear Colleague,    Thank you for referring your patient, Martina Kim, to the [facility] NEUROLOGY at [facility]. Please see a copy of my visit note below.    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  Her condition markedly improved after treatment with plasmapheresis this was her first ever treatment with plasmapheresis.     Plan:      -Reduce carbamazepin to 200 mg once a day at pm  -No Hizentra and No IVIG.  -Start Cellcept 1000 mg twice  - Follow up in 4 months    In all, I have spent at least 15 minutes with more than half of overall time in counseling and coordinating care.    Sincerely,    Frederick Vitale MD                Interval History:   Mrs. Fuentes returned to the [facility] myasthenia gravis clinic February 19, 2018.  She was last seen in November of the last year and presented with exacerbation of myasthenia gravis.  At that time she had significant fatigue shortness of breath and ocular symptoms.  She was first tried on increased dose of prednisone but showed no significant improvement.  She was then was given a course of plasmapheresis with the last treatment on December 30.  She noticed the change after the 3d treatment. She felt increasingly better. She reports on markedly improved stamina. 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 She reports on improvement with mental fatigues as well.  She continues having some fasciculation especially involving her left eye.  However, she does report on significant improvement in muscle  twitching is significantly reduced after plasmapheresis.                Medications:     Current Outpatient Prescriptions Ordered in Epic   Medication     carBAMazepine (CARBATROL) 200 MG 12 hr capsule twice a day     citalopram (CELEXA) 20 MG tablet  0.5 tab at bed time     predniSONE (DELTASONE) 5 MG tablet  12.5 mg daily     cyclobenzaprine (FLEXERIL) 10 MG tablet as needed 2 tabs     Thiamine HCl (VITAMIN  B-1) 50 MG tablet     fluorometholone (FML LIQUIFILM) 0.1 % ophthalmic susp     HEALON 10 MG/ML SOLN intra-ocular inj     spironolactone (ALDACTONE) 100 MG tablet      clonazePAM (KLONOPIN) 1 MG tablet as needed at bed time     order for DME     calcium carbonate (OS-LINDEN 500 MG [place]. [place]) 500 MG tablet     Ascorbic Acid (VITAMIN C PO)     methylPREDNISolone sodium succinate (SOLU-MEDROL) 125 mg/2 mL injection     IBUPROFEN PO     amLODIPine-benazepril (LOTREL) 2.5-10 MG per capsule     bimatoprost (LUMIGAN) 0.01 % SOLN     diphenhydrAMINE (BENADRYL) 50 MG capsule     aspirin 81 MG tablet     Cholecalciferol (VITAMIN D PO)     VITAMIN A PO     VITAMIN E     LANsoprazole (PREVACID) 30 MG capsule     Current Facility-Administered Medications Ordered in Epic   Medication Dose Route Frequency Last Rate Last Dose     lidocaine (PF) (XYLOCAINE) 1 % injection 300 mg  30 mL Subcutaneous Once                 Physical Exam:       BP: 156/85 Pulse: 72            Constitutional:   awake, alert, cooperative, no apparent distress, and appears stated age     Neurologic:   Awake, alert, oriented to name, place and time.  Cranial nerves II-XII are grossly intact.  Motor is 5 out of 5 bilaterally.         Again, thank you for allowing me to participate in the care of your patient.      Sincerely,    Frederick Vitale MD

## 2018-04-30 ENCOUNTER — TELEPHONE (OUTPATIENT)
Dept: NEUROLOGY | Facility: CLINIC | Age: 63
End: 2018-04-30

## 2018-05-01 DIAGNOSIS — R25.3 FASCICULATION: ICD-10-CM

## 2018-05-01 DIAGNOSIS — G70.00 MYASTHENIA GRAVIS WITHOUT EXACERBATION (H): ICD-10-CM

## 2018-05-01 RX ORDER — CYCLOBENZAPRINE HCL 10 MG
TABLET ORAL
Qty: 30 TABLET | Refills: 2 | Status: SHIPPED | OUTPATIENT
Start: 2018-05-01 | End: 2019-01-08

## 2018-05-03 DIAGNOSIS — G70.00 MYASTHENIA GRAVIS WITHOUT EXACERBATION (H): ICD-10-CM

## 2018-05-03 NOTE — TELEPHONE ENCOUNTER
Received refill request for clonazepam from [facility]. Pended Rx to Dr. Vitale. Once approved, will call into patient's pharmacy.

## 2018-05-08 RX ORDER — CLONAZEPAM 1 MG/1
TABLET ORAL
Qty: 60 TABLET | Refills: 3 | Status: SHIPPED | OUTPATIENT
Start: 2018-05-08 | End: 2019-01-08

## 2018-05-23 ENCOUNTER — TRANSFERRED RECORDS (OUTPATIENT)
Dept: HEALTH INFORMATION MANAGEMENT | Facility: CLINIC | Age: 63
End: 2018-05-23

## 2018-05-30 NOTE — TELEPHONE ENCOUNTER
Rx approved by Dr. Vitale. This has been called into [facility].

## 2018-06-05 ENCOUNTER — MYC MEDICAL ADVICE (OUTPATIENT)
Dept: NEUROLOGY | Facility: CLINIC | Age: 63
End: 2018-06-05

## 2018-06-05 DIAGNOSIS — G70.01 MYASTHENIA GRAVIS WITH EXACERBATION (H): Primary | ICD-10-CM

## 2018-06-11 DIAGNOSIS — G70.00 MYASTHENIA GRAVIS WITHOUT EXACERBATION (H): ICD-10-CM

## 2018-06-11 RX ORDER — PREDNISONE 5 MG/1
TABLET ORAL
Qty: 105 TABLET | Refills: 3 | Status: ON HOLD | OUTPATIENT
Start: 2018-06-11 | End: 2018-07-09

## 2018-06-12 ENCOUNTER — MEDICAL CORRESPONDENCE (OUTPATIENT)
Dept: HEALTH INFORMATION MANAGEMENT | Facility: CLINIC | Age: 63
End: 2018-06-12

## 2018-06-13 ENCOUNTER — HOSPITAL ENCOUNTER (OUTPATIENT)
Facility: AMBULATORY SURGERY CENTER | Age: 63
End: 2018-06-13
Attending: PHYSICIAN ASSISTANT
Payer: COMMERCIAL

## 2018-07-05 ENCOUNTER — TELEPHONE (OUTPATIENT)
Dept: INTERVENTIONAL RADIOLOGY/VASCULAR | Facility: CLINIC | Age: 63
End: 2018-07-05

## 2018-07-09 ENCOUNTER — APPOINTMENT (OUTPATIENT)
Dept: INTERVENTIONAL RADIOLOGY/VASCULAR | Facility: CLINIC | Age: 63
End: 2018-07-09
Attending: PSYCHIATRY & NEUROLOGY
Payer: COMMERCIAL

## 2018-07-09 ENCOUNTER — APPOINTMENT (OUTPATIENT)
Dept: MEDSURG UNIT | Facility: CLINIC | Age: 63
End: 2018-07-09
Attending: PSYCHIATRY & NEUROLOGY
Payer: COMMERCIAL

## 2018-07-09 ENCOUNTER — HOSPITAL ENCOUNTER (OUTPATIENT)
Facility: CLINIC | Age: 63
Discharge: HOME | End: 2018-07-09
Attending: PSYCHIATRY & NEUROLOGY | Admitting: PSYCHIATRY & NEUROLOGY
Payer: COMMERCIAL

## 2018-07-09 ENCOUNTER — HOSPITAL ENCOUNTER (OUTPATIENT)
Dept: LAB | Facility: CLINIC | Age: 63
End: 2018-07-09
Attending: PSYCHIATRY & NEUROLOGY
Payer: COMMERCIAL

## 2018-07-09 VITALS
HEART RATE: 72 BPM | SYSTOLIC BLOOD PRESSURE: 146 MMHG | OXYGEN SATURATION: 95 % | DIASTOLIC BLOOD PRESSURE: 88 MMHG | RESPIRATION RATE: 16 BRPM | TEMPERATURE: 97.9 F

## 2018-07-09 VITALS
TEMPERATURE: 98.1 F | RESPIRATION RATE: 20 BRPM | HEART RATE: 82 BPM | SYSTOLIC BLOOD PRESSURE: 140 MMHG | DIASTOLIC BLOOD PRESSURE: 84 MMHG | WEIGHT: 160.72 LBS | BODY MASS INDEX: 27.59 KG/M2

## 2018-07-09 DIAGNOSIS — G70.01 MYASTHENIA GRAVIS WITH EXACERBATION (H): ICD-10-CM

## 2018-07-09 LAB
ANION GAP SERPL CALCULATED.3IONS-SCNC: 8 MMOL/L (ref 3–14)
BUN SERPL-MCNC: 25 MG/DL (ref 7–30)
CALCIUM SERPL-MCNC: 9.1 MG/DL (ref 8.5–10.1)
CHLORIDE SERPL-SCNC: 108 MMOL/L (ref 94–109)
CO2 SERPL-SCNC: 26 MMOL/L (ref 20–32)
CREAT SERPL-MCNC: 0.78 MG/DL (ref 0.52–1.04)
ERYTHROCYTE [DISTWIDTH] IN BLOOD BY AUTOMATED COUNT: 12.9 % (ref 10–15)
FIBRINOGEN PPP-MCNC: 273 MG/DL (ref 200–420)
GFR SERPL CREATININE-BSD FRML MDRD: 74 ML/MIN/1.7M2
GLUCOSE SERPL-MCNC: 98 MG/DL (ref 70–99)
HCT VFR BLD AUTO: 43 % (ref 35–47)
HGB BLD-MCNC: 13.9 G/DL (ref 11.7–15.7)
INR PPP: 0.89 (ref 0.86–1.14)
MCH RBC QN AUTO: 30.4 PG (ref 26.5–33)
MCHC RBC AUTO-ENTMCNC: 32.3 G/DL (ref 31.5–36.5)
MCV RBC AUTO: 94 FL (ref 78–100)
PLATELET # BLD AUTO: 220 10E9/L (ref 150–450)
POTASSIUM SERPL-SCNC: 4.1 MMOL/L (ref 3.4–5.3)
RBC # BLD AUTO: 4.57 10E12/L (ref 3.8–5.2)
SODIUM SERPL-SCNC: 142 MMOL/L (ref 133–144)
WBC # BLD AUTO: 5.3 10E9/L (ref 4–11)

## 2018-07-09 PROCEDURE — 27210732 ZZH ACCESSORY CR1

## 2018-07-09 PROCEDURE — 25000128 H RX IP 250 OP 636: Performed by: PHYSICIAN ASSISTANT

## 2018-07-09 PROCEDURE — 25000128 H RX IP 250 OP 636: Mod: ZF | Performed by: PATHOLOGY

## 2018-07-09 PROCEDURE — 25000125 ZZHC RX 250: Performed by: RADIOLOGY

## 2018-07-09 PROCEDURE — 36558 INSERT TUNNELED CV CATH: CPT

## 2018-07-09 PROCEDURE — 80048 BASIC METABOLIC PNL TOTAL CA: CPT | Performed by: RADIOLOGY

## 2018-07-09 PROCEDURE — C1750 CATH, HEMODIALYSIS,LONG-TERM: HCPCS

## 2018-07-09 PROCEDURE — 76937 US GUIDE VASCULAR ACCESS: CPT

## 2018-07-09 PROCEDURE — 36514 APHERESIS PLASMA: CPT | Mod: ZF

## 2018-07-09 PROCEDURE — 85027 COMPLETE CBC AUTOMATED: CPT | Performed by: RADIOLOGY

## 2018-07-09 PROCEDURE — 25000128 H RX IP 250 OP 636: Performed by: RADIOLOGY

## 2018-07-09 PROCEDURE — P9041 ALBUMIN (HUMAN),5%, 50ML: HCPCS | Mod: ZF | Performed by: PATHOLOGY

## 2018-07-09 PROCEDURE — 85384 FIBRINOGEN ACTIVITY: CPT | Performed by: RADIOLOGY

## 2018-07-09 PROCEDURE — C1769 GUIDE WIRE: HCPCS

## 2018-07-09 PROCEDURE — 27210908 ZZH NEEDLE CR4

## 2018-07-09 PROCEDURE — 85610 PROTHROMBIN TIME: CPT | Performed by: RADIOLOGY

## 2018-07-09 PROCEDURE — 27210738 ZZH ACCESSORY CR2

## 2018-07-09 PROCEDURE — 27210904 ZZH KIT CR6

## 2018-07-09 PROCEDURE — 25000132 ZZH RX MED GY IP 250 OP 250 PS 637: Mod: ZF | Performed by: STUDENT IN AN ORGANIZED HEALTH CARE EDUCATION/TRAINING PROGRAM

## 2018-07-09 PROCEDURE — 40000166 ZZH STATISTIC PP CARE STAGE 1

## 2018-07-09 PROCEDURE — 25000125 ZZHC RX 250: Mod: ZF | Performed by: PATHOLOGY

## 2018-07-09 RX ORDER — HEPARIN SODIUM 1000 [USP'U]/ML
3 INJECTION, SOLUTION INTRAVENOUS; SUBCUTANEOUS
Status: COMPLETED | OUTPATIENT
Start: 2018-07-09 | End: 2018-07-09

## 2018-07-09 RX ORDER — HEPARIN SODIUM (PORCINE) LOCK FLUSH IV SOLN 100 UNIT/ML 100 UNIT/ML
500 SOLUTION INTRAVENOUS ONCE
Status: COMPLETED | OUTPATIENT
Start: 2018-07-09 | End: 2018-07-09

## 2018-07-09 RX ORDER — CEFAZOLIN SODIUM 2 G/100ML
2 INJECTION, SOLUTION INTRAVENOUS
Status: COMPLETED | OUTPATIENT
Start: 2018-07-09 | End: 2018-07-09

## 2018-07-09 RX ORDER — CALCIUM GLUCONATE 100 MG/ML
AMPUL (ML) INTRAVENOUS
Status: COMPLETED | OUTPATIENT
Start: 2018-07-09 | End: 2018-07-09

## 2018-07-09 RX ORDER — SODIUM CHLORIDE 9 MG/ML
INJECTION, SOLUTION INTRAVENOUS CONTINUOUS
Status: DISCONTINUED | OUTPATIENT
Start: 2018-07-09 | End: 2018-07-09 | Stop reason: HOSPADM

## 2018-07-09 RX ORDER — ACETAMINOPHEN 325 MG/1
650 TABLET ORAL ONCE
Status: COMPLETED | OUTPATIENT
Start: 2018-07-09 | End: 2018-07-09

## 2018-07-09 RX ORDER — HEPARIN SODIUM (PORCINE) LOCK FLUSH IV SOLN 100 UNIT/ML 100 UNIT/ML
3 SOLUTION INTRAVENOUS
Status: DISCONTINUED | OUTPATIENT
Start: 2018-07-09 | End: 2018-07-09 | Stop reason: HOSPADM

## 2018-07-09 RX ORDER — DIPHENHYDRAMINE HYDROCHLORIDE 50 MG/ML
50 INJECTION INTRAMUSCULAR; INTRAVENOUS
Status: CANCELLED | OUTPATIENT
Start: 2018-07-09

## 2018-07-09 RX ORDER — HEPARIN SODIUM (PORCINE) LOCK FLUSH IV SOLN 100 UNIT/ML 100 UNIT/ML
3 SOLUTION INTRAVENOUS EVERY 24 HOURS
Status: DISCONTINUED | OUTPATIENT
Start: 2018-07-09 | End: 2018-07-09 | Stop reason: HOSPADM

## 2018-07-09 RX ORDER — ALBUMIN HUMAN 25 %
2500 INTRAVENOUS SOLUTION INTRAVENOUS
Status: COMPLETED | OUTPATIENT
Start: 2018-07-09 | End: 2018-07-09

## 2018-07-09 RX ADMIN — CALCIUM GLUCONATE 3 G: 94 INJECTION, SOLUTION INTRAVENOUS at 13:30

## 2018-07-09 RX ADMIN — SODIUM CHLORIDE: 9 INJECTION, SOLUTION INTRAVENOUS at 08:48

## 2018-07-09 RX ADMIN — ALBUMIN (HUMAN) 2500 ML: 12.5 INJECTION, SOLUTION INTRAVENOUS at 13:30

## 2018-07-09 RX ADMIN — HEPARIN SODIUM 3000 UNITS: 1000 INJECTION, SOLUTION INTRAVENOUS; SUBCUTANEOUS at 15:20

## 2018-07-09 RX ADMIN — HEPARIN SODIUM 5000 UNITS: 1000 INJECTION, SOLUTION INTRAVENOUS; SUBCUTANEOUS at 11:56

## 2018-07-09 RX ADMIN — CEFAZOLIN SODIUM 2 G: 2 INJECTION, SOLUTION INTRAVENOUS at 08:49

## 2018-07-09 RX ADMIN — LIDOCAINE HYDROCHLORIDE 12 ML: 10 INJECTION, SOLUTION EPIDURAL; INFILTRATION; INTRACAUDAL; PERINEURAL at 10:35

## 2018-07-09 RX ADMIN — SODIUM CHLORIDE, PRESERVATIVE FREE 1.6 ML: 5 INJECTION INTRAVENOUS at 10:35

## 2018-07-09 RX ADMIN — ANTICOAGULANT CITRATE DEXTROSE SOLUTION FORMULA A 705 ML: 12.25; 11; 3.65 SOLUTION INTRAVENOUS at 13:30

## 2018-07-09 RX ADMIN — ACETAMINOPHEN 650 MG: 325 TABLET ORAL at 14:35

## 2018-07-09 NOTE — IP AVS SNAPSHOT
MRN:[id number]                      After Visit Summary   7/9/2018    Martina Kim    MRN: [id number]           Visit Information        Department      7/9/2018  7:48 AM [address] [facility]          Review of your medicines      UNREVIEWED medicines. Ask your doctor about these medicines        Dose / Directions    amLODIPine-benazepril 2.5-10 MG per capsule   Commonly known as:  LOTREL   Used for:  Hypertension        Dose:  1 capsule   Take 1 capsule by mouth daily   Quantity:  90 capsule   Refills:  3       aspirin 81 MG tablet        Take  by mouth daily. 1 Tablet Daily   Refills:  0       calcium carbonate 500 MG tablet   Commonly known as:  OS-LINDEN 500 mg [place]   Used for:  SOB (shortness of breath)        Dose:  500 mg   Take 500 mg by mouth 2 times daily   Refills:  0       carBAMazepine 200 MG 12 hr capsule   Commonly known as:  CARBATROL   Used for:  Fasciculation        Dose:  200 mg   Take 1 capsule (200 mg) by mouth 2 times daily   Quantity:  120 capsule   Refills:  5       citalopram 20 MG tablet   Commonly known as:  celeXA   Used for:  Myasthenia gravis without exacerbation (H)        TAKE ONE TABLET BY MOUTH ONCE DAILY   Quantity:  30 tablet   Refills:  2       clonazePAM 1 MG tablet   Commonly known as:  klonoPIN   Used for:  Myasthenia gravis without exacerbation (H)        Take 1-2 tablets at bedtime as needed for sleep   Quantity:  60 tablet   Refills:  3       cyclobenzaprine 10 MG tablet   Commonly known as:  FLEXERIL   Used for:  Fasciculation, Myasthenia gravis without exacerbation (H)        TAKE ONE TABLET BY MOUTH AT BEDTIME AS NEEDED FOR MUSCLE SPASM   Quantity:  30 tablet   Refills:  2       diphenhydrAMINE 50 MG capsule   Commonly known as:  BENADRYL        Dose:  50 mg   Take 50 mg by mouth every 6 hours as needed Every 4 hours during infusion   Refills:  0       fluorometholone 0.1 % ophthalmic susp   Commonly known as:  FML LIQUIFILM        Dose:  1  drop   Apply 1 drop to eye 3 times daily   Refills:  1       HEALON 10 MG/ML Soln intra-ocular inj   Generic drug:  hyaluronate        Dose:  1 Application   Apply 1 Application topically 4 times daily   Refills:  3       IBUPROFEN PO        Dose:  800 mg   Take 800 mg by mouth every 8 hours as needed   Refills:  0       LUMIGAN 0.01 % Soln   Generic drug:  bimatoprost        Dose:  1 drop   Place 1 drop into both eyes daily.   Refills:  0       mycophenolate 500 MG tablet   Used for:  Myasthenia gravis without exacerbation (H)        Dose:  1000 mg   Take 2 tablets (1,000 mg) by mouth 2 times daily   Quantity:  120 tablet   Refills:  3       predniSONE 5 MG tablet   Commonly known as:  DELTASONE   Used for:  Myasthenia gravis without exacerbation (H)        12.5mg daily   Quantity:  105 tablet   Refills:  3       PREVACID 30 MG CR capsule   Generic drug:  LANsoprazole        Dose:  30 mg   Take 30 mg by mouth daily 1 Tablet Daily   Refills:  0       SOLU-MEDROL 125 mg/2 mL injection   Generic drug:  methylPREDNISolone sodium succinate        Dose:  125 mg   Inject 125 mg into the vein once   Refills:  0       spironolactone 100 MG tablet   Commonly known as:  ALDACTONE   Used for:  MG with exacerbation (myasthenia gravis) (H)        Dose:  100 mg   Take 1 tablet (100 mg) by mouth daily Before infusion   Quantity:  5 tablet   Refills:  3       vitamin  B-1 50 MG tablet        Dose:  50 mg   Take 50 mg by mouth daily   Refills:  0       VITAMIN A PO        Dose:  8000 Units   Take 8,000 Units by mouth daily 1 Tablet Daily   Refills:  0       VITAMIN C PO        Dose:  1000 mg   Take 1,000 mg by mouth daily   Refills:  0       VITAMIN D PO        Dose:  5000 Units   Take 5,000 Units by mouth daily 1 Tablet Daily   Refills:  0       VITAMIN E        Dose:  400 Units   400 Units daily 1 Tablet Daily   Refills:  0         CONTINUE these medicines which have NOT CHANGED        Dose / Directions    order for DME         [employer]ics Dream Station AutoBiPAP 11/6 cm, ComfortGel Blue Pte nasal mask.   Refills:  0                Protect others around you: Learn how to safely use, store and throw away your medicines at www.disposemymeds.org.         Follow-ups after your visit        Your next 10 appointments already scheduled     Jul 11, 2018 12:30 PM CDT   (Arrive by 12:15 PM)   Ther Plasma Exchange with UC APHERESIS RN5, UC 35 ATC   [facility]heres[facility] ([facility])    [address]  [address]  [place] [address]   [phone]            Jul 13, 2018 12:30 PM CDT   (Arrive by 12:15 PM)   Ther Plasma Exchange with  APHERESIS RN1, UC 33 ATC   [facility] ([facility])    [address]  [address]  [place] [address]   [phone]            Jul 16, 2018 12:30 PM CDT   (Arrive by 12:15 PM)   Ther Plasma Exchange with UC APHERESIS RN3, UC 34 ATC   [facility] ([facility])    [address]  [address]  [place] [address]   [phone]            Jul 18, 2018 12:30 PM CDT   (Arrive by 12:15 PM)   Ther Plasma Exchange with UC APHERESIS RN3, UC 34 ATC   [facility] ([facility])    [address]  [address]  [place] [address]   [phone]            Sep 24, 2018  4:00 PM CDT   (Arrive by 3:45 PM)   Return Myasthenia Gravis with Frederick Vitale MD   [facility]rology ([facility])    [address]  3rd Floor  [place] [id number] [phone]               Care Instructions        Further instructions from your care team                                                                        Interventional Radiology                                                                   Discharge Instructions                                                                 FollowingTunneled Catheter Placement    Your site(s) has been closed with:   The Catheter is sutured  to skin at  insertion site.  Derma Bunn (Skin Glue) on neck incision    Do not apply any ointments over site    This thin layer will slough off in 7-10 days               May gently remove Derma Bunn in 10 days if still present         Tunneled catheter is always covered with a Sterile Transparent dressing    Keep site clean and dry     Cover with an occlusive dressing for showering    Weekly transparent dressing changes or when it becomes wet or dirty     If there is any oozing or bleeding from the site, apply direct pressure for 5-10 minutes with a gauze pad.  If bleeding continues after 10 minutes call your primary doctor.  If bleeding cannot be controlled with direct pressure, call 911.    Call your Doctor if:    Bleeding as above    Swelling in your neck or arm    Sudden onset of shortness of breath, lightheadedness, or heart palpitations..    Fever greater than 100.5  F    Other signs of infection such as, redness, tenderness, or drainage from the wound.    ADDITIONAL INSTRUCTIONS:           No heavy lifting greater than 10 lbs for 3 days.           May use ice pack for pain or minor swelling for 15 min 3-4 times per day.    [facility] Interventional Radiology Department:    Physician: Dr. Jarrell   Date:July 9, 2018  Telephone Numbers:  [phone].....8 am to 4:30 pm   Monday-Friday  Hospital : [phone]....After hours, Weekends, & Holidays.  Ask for the Interventional Radiologist on call.  Someone is on call 24 hours a day.   Emergency Department:  [phone]                                                                                                                                                               Additional Information About Your Visit        [email or web address] Information     [employer] gives you secure access to your electronic health record. If you  see a primary care provider, you can also send messages to your care team and make appointments. If you have questions, please call your primary care clinic.  If you do not have a primary care provider, please call [phone] and they will assist you.        Care EveryWhere ID     This is your Care EveryWhere ID. This could be used by other organizations to access your [facility] medical records  [id number]        Your Vitals Were     Blood Pressure Pulse Temperature Respirations Pulse Oximetry       145/89 73 97.9  F (36.6  C) (Oral) 16 94%        Primary Care Provider Office Phone # Fax #    Ivan Marie [phone]      Equal Access to Services     ALEXANDRA LANCASETR : Katherine Storm, jason lepe, rome ramirez, tristen germain . So [facility] [phone].    ATENCIÓN: Si habla español, tiene a mann disposición servicios gratuitos de asistencia lingüística. Llame al [phone].    We comply with applicable federal civil rights laws and [place] laws. We do not discriminate on the basis of race, color, national origin, age, disability, sex, sexual orientation, or gender identity.            Thank you!     Thank you for choosing [facility] for your care. Our goal is always to provide you with excellent care. Hearing back from our patients is one way we can continue to improve our services. Please take a few minutes to complete the written survey that you may receive in the mail after you visit with us. Thank you!             Medication List: This is a list of all your medications and when to take them. Check marks below indicate your daily home schedule. Keep this list as a reference.      Medications           Morning Afternoon Evening Bedtime As Needed    amLODIPine-benazepril 2.5-10 MG per capsule   Commonly known as:  LOTREL   Take 1 capsule by mouth daily                                aspirin 81 MG tablet   Take  by mouth daily. 1 Tablet  Daily                                calcium carbonate 500 MG tablet   Commonly known as:  OS-LINDEN 500 mg [place]. [place]   Take 500 mg by mouth 2 times daily                                carBAMazepine 200 MG 12 hr capsule   Commonly known as:  CARBATROL   Take 1 capsule (200 mg) by mouth 2 times daily                                citalopram 20 MG tablet   Commonly known as:  celeXA   TAKE ONE TABLET BY MOUTH ONCE DAILY                                clonazePAM 1 MG tablet   Commonly known as:  klonoPIN   Take 1-2 tablets at bedtime as needed for sleep                                cyclobenzaprine 10 MG tablet   Commonly known as:  FLEXERIL   TAKE ONE TABLET BY MOUTH AT BEDTIME AS NEEDED FOR MUSCLE SPASM                                diphenhydrAMINE 50 MG capsule   Commonly known as:  BENADRYL   Take 50 mg by mouth every 6 hours as needed Every 4 hours during infusion                                fluorometholone 0.1 % ophthalmic susp   Commonly known as:  FML LIQUIFILM   Apply 1 drop to eye 3 times daily                                HEALON 10 MG/ML Soln intra-ocular inj   Apply 1 Application topically 4 times daily   Generic drug:  hyaluronate                                IBUPROFEN PO   Take 800 mg by mouth every 8 hours as needed                                LUMIGAN 0.01 % Soln   Place 1 drop into both eyes daily.   Generic drug:  bimatoprost                                mycophenolate 500 MG tablet   Take 2 tablets (1,000 mg) by mouth 2 times daily                                order for [facility]   [employer] Dream Station AutoBiPAP 11/6 cm, ComfortGel Blue Pte nasal mask.                                predniSONE 5 MG tablet   Commonly known as:  DELTASONE   12.5mg daily                                PREVACID 30 MG CR capsule   Take 30 mg by mouth daily 1 Tablet Daily   Generic drug:  LANsoprazole                                SOLU-MEDROL 125 mg/2 mL injection   Inject 125 mg into the vein once   Generic  drug:  methylPREDNISolone sodium succinate                                spironolactone 100 MG tablet   Commonly known as:  ALDACTONE   Take 1 tablet (100 mg) by mouth daily Before infusion                                vitamin  B-1 50 MG tablet   Take 50 mg by mouth daily                                VITAMIN A PO   Take 8,000 Units by mouth daily 1 Tablet Daily                                VITAMIN C PO   Take 1,000 mg by mouth daily                                VITAMIN D PO   Take 5,000 Units by mouth daily 1 Tablet Daily                                VITAMIN E   400 Units daily 1 Tablet Daily

## 2018-07-09 NOTE — DISCHARGE INSTRUCTIONS
Interventional Radiology                                                                   Discharge Instructions                                                                FollowingTunneled Catheter Placement    Your site(s) has been closed with:   The Catheter is sutured  to skin at  insertion site.  Derma Bunn (Skin Glue) on neck incision    Do not apply any ointments over site    This thin layer will slough off in 7-10 days               May gently remove Derma Bunn in 10 days if still present         Tunneled catheter is always covered with a Sterile Transparent dressing    Keep site clean and dry     Cover with an occlusive dressing for showering    Weekly transparent dressing changes or when it becomes wet or dirty     If there is any oozing or bleeding from the site, apply direct pressure for 5-10 minutes with a gauze pad.  If bleeding continues after 10 minutes call your primary doctor.  If bleeding cannot be controlled with direct pressure, call 911.    Call your Doctor if:    Bleeding as above    Swelling in your neck or arm    Sudden onset of shortness of breath, lightheadedness, or heart palpitations..    Fever greater than 100.5  F    Other signs of infection such as, redness, tenderness, or drainage from the wound.    ADDITIONAL INSTRUCTIONS:           No heavy lifting greater than 10 lbs for 3 days.           May use ice pack for pain or minor swelling for 15 min 3-4 times per day.    [facility] Interventional Radiology Department:    Physician: Dr. Jarrell   Date:July 9, 2018  Telephone Numbers:  [phone].....8 am to 4:30 pm   Monday-Friday  Hospital : [phone]....After hours, Weekends, & Holidays.  Ask for the Interventional Radiologist on call.  Someone is on call 24 hours a day.   Emergency Department:  [phone]

## 2018-07-09 NOTE — PROGRESS NOTES
Patient Name: Martina Kim  Medical Record Number: [id number]  Today's Date: 7/9/2018    Procedure: tunnel line placement   Proceduralist: Dr.Rosenberg Whitehead    Procedure start time: 1015  Procedure end time: 1030    Report given to: 2A RN       Pt arrived to IR room 2 from  on RA. Consent verified.Pt denies any questions or concerns regarding procedure. Pt positioned supine and monitored per protocol, requested no sedation. Pt tolerated procedure without any noted complications. Pt transferred back to . Tunnel line locked with heparin 1.6ml per lumen per MD.

## 2018-07-09 NOTE — IP AVS SNAPSHOT
[address] [address]                                       After Visit Summary   7/9/2018    Martina Kim    MRN: [id number]           After Visit Summary Signature Page     I have received my discharge instructions, and my questions have been answered. I have discussed any challenges I see with this plan with the nurse or doctor.    ..........................................................................................................................................  Patient/Patient Representative Signature      ..........................................................................................................................................  Patient Representative Print Name and Relationship to Patient    ..................................................               ................................................  Date                                            Time    ..........................................................................................................................................  Reviewed by Signature/Title    ...................................................              ..............................................  Date                                                            Time

## 2018-07-09 NOTE — BRIEF OP NOTE
Interventional Radiology Brief Post Procedure Note    Procedure: IR CVC TUNNEL PLACEMENT > 5 YRS OF AGE    Proceduralist: Quique Jarrell MD and Mehran Thrasher MD    Assistant: None    Time Out: Prior to the start of the procedure and with procedural staff participation, I verbally confirmed the patient s identity using two indicators, relevant allergies, that the procedure was appropriate and matched the consent or emergent situation, and that the correct equipment/implants were available. Immediately prior to starting the procedure I conducted the Time Out with the procedural staff and re-confirmed the patient s name, procedure, and site/side. (The Joint Commission universal protocol was followed.)  Yes    Medications   Medication Event Details Admin User Admin Time   lidocaine 1 % 12 mL Medication Given by Other Dose: 12 mL; Route: Other; Scheduled Time: 10:45 AM Ronit Ctoe RN 7/9/2018 10:35 AM   heparin 100 UNIT/ML injection 500 Units Medication Given by Other Dose: 1.6 mL; Route: Intracatheter; Scheduled Time: 10:45 AM; Comment: 1.6ml given by MD to both lumen Ronit Cote RN 7/9/2018 10:35 AM       Sedation: None. Local Anesthestic used    Findings: Routine tunneled CVC placement in the R IJ without sedation. 14.5 fr 19cm palindrome dual lumen apheresis capable catheter placed.     Estimated Blood Loss: Minimal    Fluoroscopy Time: 1.5 minute(s)    SPECIMENS: None    Complications: 1. None     Condition: Stable    Plan:   -Line ready for immediate use  -Can be monitoring in 2a until back to baseline activity, then OK to DC (no sedation)      Comments: See dictated procedure note for full details.    Quique Jarrell MD

## 2018-07-09 NOTE — PROGRESS NOTES
1050 Pt arrived on 2a post tunneled line placement. Dressing c/d/i. No pain. Family at bedside. 1100 Pt eating and drinking. 1130 Oozing noted at site, pressure applied and dressing applied using sterile technique. 1145 Pt tolerating oral intake. DC instructions reviewed, copy given to pt. PIV dc'd. 1155 PT dc'd to clinic accompanied by sister.

## 2018-07-09 NOTE — IP AVS SNAPSHOT
MRN:[id number]                      After Visit Summary   7/9/2018    Martina Kim    MRN: [id number]           Thank you!     Thank you for choosing [facility] for your care. Our goal is always to provide you with excellent care. Hearing back from our patients is one way we can continue to improve our services. Please take a few minutes to complete the written survey that you may receive in the mail after you visit with us. Thank you!        Patient Information     Date Of Birth          1955        About your hospital stay     You were admitted on:  July 9, 2018 You last received care in the:  [facility]    You were discharged on:  July 9, 2018       Who to Call     For medical emergencies, please call 911.  For non-urgent questions about your medical care, please call your primary care provider or clinic, [phone]          Attending Provider     Provider Specialty    Frederick Vitale MD Pediatric Neurology       Primary Care Provider Office Phone # Fax #    Ivan Marie [phone]      Your next 10 appointments already scheduled     Jul 11, 2018 12:30 PM CDT   (Arrive by 12:15 PM)   Ther Plasma Exchange with [facility] APHERESIS RN5, UC 35 ATC   [facility] ([facility])    [address] [place] [address]  [address] [place] [id number] [phone]            Jul 13, 2018 12:30 PM CDT   (Arrive by 12:15 PM)   Ther Plasma Exchange with UC APHERESIS RN1, UC 33 ATC   [facility] ([facility])    [address] [place] [address]  [address]  [facility] [address]   [phone]            Jul 16, 2018 12:30 PM CDT   (Arrive by 12:15 PM)   Ther Plasma Exchange with UC APHERESIS RN3, UC 34 ATC   [facility] ([facility])    [address] [place] [address]  [address] [place] [address]  [id number]   [phone]            Jul 18, 2018 12:30 PM CDT   (Arrive by 12:15 PM)   Ther Plasma Exchange with  APHERESIS RN3, UC 34 ATC   [facility] ([facility])    [address]  [address]  [facility] [address]   [phone]            Sep 24, 2018  4:00 PM CDT   (Arrive by 3:45 PM)   Return Myasthenia Gravis with Frederick Vitale MD   [facility] ([facility])    [address]  3rd Floor  [facility] [id number] [phone]              Further instructions from your care team       Apheresis Blood Donor Center Post Instructions  You may feel tired after your procedure today.   Please call your doctor if you have:  bleeding that doesn t stop, fever, pain where a needle or tube (catheter) was placed, seizures, trouble breathing, red urine, nausea or vomiting, other health concerns.     If your symptoms are severe, call 911.  If you have a Central Venous Catheter:  Notify your doctor if you have had a fever, chills, shaking  or redness, warmth, swelling, drainage at the exit-site.  This could be a sign of infection.    The Apheresis/Blood Donor Center is open Monday-Friday 7:30 a.m. to 5 p.m.  The phone number is [phone].  A Transfusion Medicine physician can be reached after 5:00 p.m. weekdays and on weekends /Holidays by calling [phone], and asking for the physician on call.      Plasma exchange:  If you received albumin as part of your treatment (this is the most common), some of your clotting factors have been removed.  You body will replace these factors, but you could be at a slight risk for bleeding.  Please inform us if you have had any bleeding or a recent invasive procedure, such as a biopsy or surgery.    Certain medications that lower your blood pressure (ace inhibitors) such as Lisinopril are contraindicated while you are receiving plasma exchange.  Please inform us  if you have started taking this medication during your plasma exchange series.      Pending Results     Date and Time Order Name Status Description    7/9/2018 0749 IR CVC Tunnel Placement > 5 Yrs of Age Preliminary             Admission Information     Date & Time Provider Department Dept. Phone    7/9/2018 Frederick Vitale MD [facility] [phone]      Your Vitals Were     Blood Pressure Pulse Temperature Respirations Weight BMI (Body Mass Index)    144/89 77 98.2  F (36.8  C) (Oral) 20 72.9 kg (160 lb 11.5 oz) 27.59 kg/m2      MyChart Information     [employer] gives you secure access to your electronic health record. If you see a primary care provider, you can also send messages to your care team and make appointments. If you have questions, please call your primary care clinic.  If you do not have a primary care provider, please call [phone] and they will assist you.        Care EveryWhere ID     This is your Care EveryWhere ID. This could be used by other organizations to access your [place] medical records  [id number]        Equal Access to Services     ALEXANDRA LANCASTER AH: Hadii anand Storm, washashankda sherley, qaybta reginaalchristiano ramirez, tristen kohli. So [facility] [phone].    ATENCIÓN: Si habla español, tiene a mann disposición servicios gratuitos de asistencia lingüística. Kayame al [phone].    We comply with applicable federal civil rights laws and [place] laws. We do not discriminate on the basis of race, color, national origin, age, disability, sex, sexual orientation, or gender identity.               Review of your medicines      UNREVIEWED medicines. Ask your doctor about these medicines        Dose / Directions    AMLODIPINE BESYLATE PO   Indication:  Pt taking in place of Lotrel (amlodipine besy-benazepril hc), which has ace-inhibitor which is contraindicated during plasmapheresis        Dose:  10 mg   Take 10 mg  by mouth daily   Refills:  0       amLODIPine-benazepril 2.5-10 MG per capsule   Commonly known as:  LOTREL   Used for:  Hypertension        Dose:  1 capsule   Take 1 capsule by mouth daily   Quantity:  90 capsule   Refills:  3       aspirin 81 MG tablet        Take  by mouth daily. 1 Tablet Daily   Refills:  0       calcium carbonate 500 MG tablet   Commonly known as:  OS-LINDEN 500 mg [place]. [place]   Used for:  SOB (shortness of breath)        Dose:  500 mg   Take 500 mg by mouth 2 times daily   Refills:  0       carBAMazepine 200 MG 12 hr capsule   Commonly known as:  CARBATROL   Used for:  Fasciculation        Dose:  200 mg   Take 1 capsule (200 mg) by mouth 2 times daily   Quantity:  120 capsule   Refills:  5       citalopram 20 MG tablet   Commonly known as:  celeXA   Used for:  Myasthenia gravis without exacerbation (H)        TAKE ONE TABLET BY MOUTH ONCE DAILY   Quantity:  30 tablet   Refills:  2       clonazePAM 1 MG tablet   Commonly known as:  klonoPIN   Used for:  Myasthenia gravis without exacerbation (H)        Take 1-2 tablets at bedtime as needed for sleep   Quantity:  60 tablet   Refills:  3       cyclobenzaprine 10 MG tablet   Commonly known as:  FLEXERIL   Used for:  Fasciculation, Myasthenia gravis without exacerbation (H)        TAKE ONE TABLET BY MOUTH AT BEDTIME AS NEEDED FOR MUSCLE SPASM   Quantity:  30 tablet   Refills:  2       diphenhydrAMINE 50 MG capsule   Commonly known as:  BENADRYL        Dose:  50 mg   Take 50 mg by mouth every 6 hours as needed Every 4 hours during infusion   Refills:  0       fluorometholone 0.1 % ophthalmic susp   Commonly known as:  FML LIQUIFILM        Dose:  1 drop   Apply 1 drop to eye 3 times daily   Refills:  1       HEALON 10 MG/ML Soln intra-ocular inj   Generic drug:  hyaluronate        Dose:  1 Application   Apply 1 Application topically 4 times daily   Refills:  3       IBUPROFEN PO        Dose:  800 mg   Take 800 mg by mouth every 8 hours as needed   Refills:   0       LUMIGAN 0.01 % Soln   Generic drug:  bimatoprost        Dose:  1 drop   Place 1 drop into both eyes daily.   Refills:  0       mycophenolate 500 MG tablet   Used for:  Myasthenia gravis without exacerbation (H)        Dose:  1000 mg   Take 2 tablets (1,000 mg) by mouth 2 times daily   Quantity:  120 tablet   Refills:  3       predniSONE 5 MG tablet   Commonly known as:  DELTASONE   Used for:  Myasthenia gravis without exacerbation (H)        12.5mg daily   Quantity:  105 tablet   Refills:  3       PREVACID 30 MG CR capsule   Generic drug:  LANsoprazole        Dose:  30 mg   Take 30 mg by mouth daily 1 Tablet Daily   Refills:  0       SOLU-MEDROL 125 mg/2 mL injection   Generic drug:  methylPREDNISolone sodium succinate        Dose:  125 mg   Inject 125 mg into the vein once   Refills:  0       spironolactone 100 MG tablet   Commonly known as:  ALDACTONE   Used for:  MG with exacerbation (myasthenia gravis) (H)        Dose:  100 mg   Take 1 tablet (100 mg) by mouth daily Before infusion   Quantity:  5 tablet   Refills:  3       vitamin  B-1 50 MG tablet        Dose:  50 mg   Take 50 mg by mouth daily   Refills:  0       VITAMIN A PO        Dose:  8000 Units   Take 8,000 Units by mouth daily 1 Tablet Daily   Refills:  0       VITAMIN C PO        Dose:  1000 mg   Take 1,000 mg by mouth daily   Refills:  0       VITAMIN D PO        Dose:  5000 Units   Take 5,000 Units by mouth daily 1 Tablet Daily   Refills:  0       VITAMIN E        Dose:  400 Units   400 Units daily 1 Tablet Daily   Refills:  0         CONTINUE these medicines which have NOT CHANGED        Dose / Directions    order for [facility]        [facility] [place] AutoBiPAP 11/6 cm, ComfortGel Blue Pte nasal mask.   Refills:  0                Protect others around you: Learn how to safely use, store and throw away your medicines at www.disposemymeds.org.             Medication List: This is a list of all your medications and when to take them. Check  marks below indicate your daily home schedule. Keep this list as a reference.      Medications           Morning Afternoon Evening Bedtime As Needed    AMLODIPINE BESYLATE PO   Take 10 mg by mouth daily                                amLODIPine-benazepril 2.5-10 MG per capsule   Commonly known as:  LOTREL   Take 1 capsule by mouth daily                                aspirin 81 MG tablet   Take  by mouth daily. 1 Tablet Daily                                calcium carbonate 500 MG tablet   Commonly known as:  OS-LINDEN 500 mg [place]. [place]   Take 500 mg by mouth 2 times daily                                carBAMazepine 200 MG 12 hr capsule   Commonly known as:  CARBATROL   Take 1 capsule (200 mg) by mouth 2 times daily                                citalopram 20 MG tablet   Commonly known as:  celeXA   TAKE ONE TABLET BY MOUTH ONCE DAILY                                clonazePAM 1 MG tablet   Commonly known as:  klonoPIN   Take 1-2 tablets at bedtime as needed for sleep                                cyclobenzaprine 10 MG tablet   Commonly known as:  FLEXERIL   TAKE ONE TABLET BY MOUTH AT BEDTIME AS NEEDED FOR MUSCLE SPASM                                diphenhydrAMINE 50 MG capsule   Commonly known as:  BENADRYL   Take 50 mg by mouth every 6 hours as needed Every 4 hours during infusion                                fluorometholone 0.1 % ophthalmic susp   Commonly known as:  FML LIQUIFILM   Apply 1 drop to eye 3 times daily                                HEALON 10 MG/ML Soln intra-ocular inj   Apply 1 Application topically 4 times daily   Generic drug:  hyaluronate                                IBUPROFEN PO   Take 800 mg by mouth every 8 hours as needed                                LUMIGAN 0.01 % Soln   Place 1 drop into both eyes daily.   Generic drug:  bimatoprost                                mycophenolate 500 MG tablet   Take 2 tablets (1,000 mg) by mouth 2 times daily                                order for  [facility]   [facility] [place] AutoBiPAP 11/6 cm, ComfortGel Blue Pte nasal mask.                                predniSONE 5 MG tablet   Commonly known as:  DELTASONE   12.5mg daily                                PREVACID 30 MG CR capsule   Take 30 mg by mouth daily 1 Tablet Daily   Generic drug:  LANsoprazole                                SOLU-MEDROL 125 mg/2 mL injection   Inject 125 mg into the vein once   Generic drug:  methylPREDNISolone sodium succinate                                spironolactone 100 MG tablet   Commonly known as:  ALDACTONE   Take 1 tablet (100 mg) by mouth daily Before infusion                                vitamin  B-1 50 MG tablet   Take 50 mg by mouth daily                                VITAMIN A PO   Take 8,000 Units by mouth daily 1 Tablet Daily                                VITAMIN C PO   Take 1,000 mg by mouth daily                                VITAMIN D PO   Take 5,000 Units by mouth daily 1 Tablet Daily                                VITAMIN E   400 Units daily 1 Tablet Daily

## 2018-07-09 NOTE — DISCHARGE INSTRUCTIONS

## 2018-07-09 NOTE — PROCEDURES
Transfusion Medicine  Apheresis Procedure Note    Martina Kim [id number]   YOB: 1955 Age: 62 year old        Procedure: Therapeutic plasma exchange           Assessment and Plan:   62 year old female undergoes therapeutic plasma exchange for myasthenia gravis.  The plan is to conduct a series of approximately 5 exchanges, approximately every other day, using 5% albumin as the replacement fluid.      Today was procedure #1 of the planned 5 procedures.  She tolerated the procedure well without complication.    Please do not start or continue ace-inhibitors throughout the duration of a therapeutic plasma exchange series. Please notify the apheresis physician of any upcoming procedures, surgeries, or biopsies as therapeutic plasma exchange will affect coagulation factors.         Chief Complaint:   Weakness         History of Present Illness:   62 year old female presents for therapeutic plasma exchange.  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 The patient underwent plasma exchange in December 2017 with good relief of her symptoms.  She now presents with worsening of her typical symptoms, including fatigue, hoarseness, shortness of breath, and lower extremity weakness, as well as more stiffness in her legs than she has had in the past.  The patient also has a history of hypertension, hyperlipidemia, kidney stone, and chronic back pain due to scolioss.               Past Medical History:     Past Medical History:   Diagnosis Date     Gastro-oesophageal reflux disease      Hypertension      Myasthenia gravis (H)      Myasthenia gravis (H)      Scoliosis, congenital              Past Surgical History:     Past Surgical History:   Procedure Laterality Date     APPENDECTOMY       BIOPSY        BREAST SURGERY       ENT SURGERY       GYN SURGERY       INSERT CATHETER VASCULAR ACCESS Right 12/20/2017    Procedure: INSERT CATHETER VASCULAR ACCESS;  Tunneled central venous catheter placement;  Surgeon: Alex Montes PA-C;  [facility]     PHACOEMULSIFICATION CLEAR CORNEA WITH STANDARD INTRAOCULAR LENS IMPLANT Left 4/8/2015    Procedure: PHACOEMULSIFICATION CLEAR CORNEA WITH STANDARD INTRAOCULAR LENS IMPLANT;  Surgeon: Perfecto Arrieta MD;  [facility]:  [facility]   Thymectomy  Hysterectomy           Social History:     Social History   Substance Use Topics     Smoking status: Former Smoker     Types: Cigarettes     Quit date: 10/27/1996     Smokeless tobacco: Former User     Alcohol use 3.0 oz/week     1 Glasses of wine, 2 Cans of beer, 2 Standard drinks or equivalent per week      Comment: WEEKLY             Family History:     Family History   Problem Relation Age of Onset     C.A.D. Mother      Alzheimer Disease Father            Allergies:     Allergies   Allergen Reactions     Bee Venom Anaphylaxis     Fentanyl And Related Nausea and Vomiting     Intractable vomiting     Wasp Venom Protein Anaphylaxis     Codeine Nausea and Vomiting     Fosamax [Alendronic Acid]      Other reaction(s): GI Upset  GERD     Morphine Nausea and Vomiting     Morphine Hcl Nausea and Vomiting     Pt states she is allergic to ALL Narcotics.     Oxycodone Nausea and Vomiting     Percocet [Oxycodone-Acetaminophen] Nausea and Vomiting     Phenytoin Nausea and Vomiting     Other reaction(s): GI Upset     Contrast Dye Rash     Other reaction(s): Intolerance-Can't Take  Worsens MG symptoms  Worsens MG symptoms             Medications:   I have reviewed this patient's current medications          Review of Systems:   See above           Exam:     /84  Pulse 82  Temp 98.1  F (36.7  C) (Oral)  Resp 20  Wt 72.9 kg (160 lb 11.5 oz)  BMI 27.59 kg/m2  Alert, no acute distress  Breathing appears comfortable on room  air.  Tunneled central line accessed for the procedure.            Data:     BMP  Recent Labs  Lab 07/09/18  0840      POTASSIUM 4.1   CHLORIDE 108   LINDEN 9.1   CO2 26   BUN 25   CR 0.78   GLC 98     CBC  Recent Labs  Lab 07/09/18  0840   WBC 5.3   RBC 4.57   HGB 13.9   HCT 43.0   MCV 94   MCH 30.4   MCHC 32.3   RDW 12.9        INR  Recent Labs  Lab 07/09/18  0840   INR 0.89                 Procedure Summary:   A single volume therapeutic plasma exchange was performed and 5% albumin was used as the replacement fluid.  A dual lumen central line was used for access and allowed for appropriate flow during the procedure.  ACD-A was used for anticoagulation. To offset the effects of the citrate, calcium gluconate was given in the return line.  The patient's vital signs were stable throughout.  The patient tolerated the procedure well without complication.  See apheresis flowsheet for additional details.      Attestation: During the procedure the patient was directly seen and evaluated by me, Alex Terry MD.    Alex Terry MD  Transfusion Medicine Attending  Laboratory Medicine & Pathology  Pager: [phone]

## 2018-07-09 NOTE — CONSULTS
Transfusion Medicine Consultation    Martina Kim [id number]   YOB: 1955 Age: 62 year old   Date of Consult: 7/9/2018     Reason for consult: Therapeutic plasma exchange           Assessment and Plan:   62 year old female presents for consultation for therapeutic plasma exchange for myesthenia gravis.  The plan is to conduct a series of approximately 5 exchanges, approximately every other day, using 5% albumin as the replacement fluid.      The risks and benefits of the procedure were explained to the patient.  The patient has a central line in place for vascular access.  The patient signed affirmation of informed consent.    Please do not start or continue ace-inhibitors throughout the duration of a therapeutic plasma exchange series. Please notify the apheresis physician of any upcoming procedures, surgeries, or biopsies as therapeutic plasma exchange will affect coagulation factors.         Chief Complaint:   Transfusion medicine consultation.         History of Present Illness:   62 year old female presents for consultation for therapeutic plasma exchange.  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 The patient underwent plasma exchange in December 2017 with good relief of her symptoms.  She now presents with worsening of her typical symptoms, including fatigue, hoarseness, shortness of breath, and lower extremity weakness, as well as more stiffness in her legs than she has had in the past.  The patient also has a history of hypertension, hyperlipidemia, kidney stone, and chronic back pain due to scolioss.  Other than these symptoms, the patient is currently well. The procedure, risks/benefits were discussed with the patient and all of her questions were addressed.             Past  Medical History:     Past Medical History:   Diagnosis Date     Gastro-oesophageal reflux disease      Hypertension      Myasthenia gravis (H)      Myasthenia gravis (H)      Scoliosis, congenital              Past Surgical History:     Past Surgical History:   Procedure Laterality Date     APPENDECTOMY       BIOPSY       BREAST SURGERY       ENT SURGERY       GYN SURGERY       INSERT CATHETER VASCULAR ACCESS Right 12/20/2017    Procedure: INSERT CATHETER VASCULAR ACCESS;  Tunneled central venous catheter placement;  Surgeon: Alex Montes PA-C;  [facility]     PHACOEMULSIFICATION CLEAR CORNEA WITH STANDARD INTRAOCULAR LENS IMPLANT Left 4/8/2015    Procedure: PHACOEMULSIFICATION CLEAR CORNEA WITH STANDARD INTRAOCULAR LENS IMPLANT;  Surgeon: Perfecto Arrieta MD;  Location:  [facility]   Thymectomy  Hysterectomy           Social History:     Social History   Substance Use Topics     Smoking status: Former Smoker     Types: Cigarettes     Quit date: 10/27/1996     Smokeless tobacco: Former User     Alcohol use 3.0 oz/week     1 Glasses of wine, 2 Cans of beer, 2 Standard drinks or equivalent per week      Comment: WEEKLY             Family History:     Family History   Problem Relation Age of Onset     C.A.D. Mother      Alzheimer Disease Father              Immunizations:     There is no immunization history on file for this patient.          Allergies:     Allergies   Allergen Reactions     Bee Venom Anaphylaxis     Fentanyl And Related Nausea and Vomiting     Intractable vomiting     Wasp Venom Protein Anaphylaxis     Codeine Nausea and Vomiting     Fosamax [Alendronic Acid]      Other reaction(s): GI Upset  GERD     Morphine Nausea and Vomiting     Morphine Hcl Nausea and Vomiting     Pt states she is allergic to ALL Narcotics.     Oxycodone Nausea and Vomiting     Percocet [Oxycodone-Acetaminophen] Nausea and Vomiting     Phenytoin Nausea and Vomiting     Other reaction(s): GI Upset     Contrast Dye Rash      Other reaction(s): Intolerance-Can't Take  Worsens MG symptoms  Worsens MG symptoms             Medications:   I have reviewed this patient's current medications          Review of Systems:   CONSTITUTIONAL: NEGATIVE for fever, chills, change in weight  ENT/MOUTH: NEGATIVE for ear, mouth and throat problems  RESP: NEGATIVE for significant cough or SOB  CV: NEGATIVE for chest pain, palpitations or peripheral edema  GI: NEGATIVE for nausea, abdominal pain, heartburn, or change in bowel habits  MUSCULOSKELETAL: NEGATIVE for significant arthralgias or myalgia and POSITIVE  for chronic back pain            Vital Signs:     /89  Pulse 77  Temp 98.2  F (36.8  C) (Oral)  Resp 20  Wt 72.9 kg (160 lb 11.5 oz)  BMI 27.59 kg/m2              Data:     CBC RESULTS:   Recent Labs   Lab Test  07/09/18   0840   WBC  5.3   RBC  4.57   HGB  13.9   HCT  43.0   MCV  94   MCH  30.4   MCHC  32.3   RDW  12.9   PLT  220     Lorna Peterson MD, PGY4  Transfusion Medicine Service   Pager: [phone]

## 2018-07-09 NOTE — PROGRESS NOTES
0900 Pt on 2a prepped and ready for tunneled line placement. PIV placed and labs sent. Family at bedside. Pt consented.

## 2018-07-10 DIAGNOSIS — R25.3 FASCICULATION: ICD-10-CM

## 2018-07-10 RX ORDER — HEPARIN SODIUM 1000 [USP'U]/ML
3 INJECTION, SOLUTION INTRAVENOUS; SUBCUTANEOUS
Status: CANCELLED | OUTPATIENT
Start: 2018-07-10

## 2018-07-10 RX ORDER — ALBUMIN HUMAN 25 %
2500 INTRAVENOUS SOLUTION INTRAVENOUS
Status: CANCELLED | OUTPATIENT
Start: 2018-07-10

## 2018-07-10 RX ORDER — DIPHENHYDRAMINE HYDROCHLORIDE 50 MG/ML
50 INJECTION INTRAMUSCULAR; INTRAVENOUS
Status: CANCELLED | OUTPATIENT
Start: 2018-07-10

## 2018-07-10 RX ORDER — CALCIUM GLUCONATE 100 MG/ML
AMPUL (ML) INTRAVENOUS
Status: CANCELLED | OUTPATIENT
Start: 2018-07-10

## 2018-07-11 ENCOUNTER — HOSPITAL ENCOUNTER (OUTPATIENT)
Dept: LAB | Facility: CLINIC | Age: 63
Discharge: HOME | End: 2018-07-11
Attending: PSYCHIATRY & NEUROLOGY | Admitting: PSYCHIATRY & NEUROLOGY
Payer: COMMERCIAL

## 2018-07-11 VITALS
HEART RATE: 98 BPM | SYSTOLIC BLOOD PRESSURE: 148 MMHG | DIASTOLIC BLOOD PRESSURE: 82 MMHG | TEMPERATURE: 97.9 F | RESPIRATION RATE: 20 BRPM

## 2018-07-11 LAB
BASOPHILS # BLD AUTO: 0 10E9/L (ref 0–0.2)
BASOPHILS NFR BLD AUTO: 0.3 %
DIFFERENTIAL METHOD BLD: NORMAL
EOSINOPHIL # BLD AUTO: 0 10E9/L (ref 0–0.7)
EOSINOPHIL NFR BLD AUTO: 0.3 %
ERYTHROCYTE [DISTWIDTH] IN BLOOD BY AUTOMATED COUNT: 12.2 % (ref 10–15)
FIBRINOGEN PPP-MCNC: 199 MG/DL (ref 200–420)
HCT VFR BLD AUTO: 42.1 % (ref 35–47)
HGB BLD-MCNC: 13.7 G/DL (ref 11.7–15.7)
IMM GRANULOCYTES # BLD: 0 10E9/L (ref 0–0.4)
IMM GRANULOCYTES NFR BLD: 0.3 %
INR PPP: 1.1 (ref 0.86–1.14)
LYMPHOCYTES # BLD AUTO: 1.2 10E9/L (ref 0.8–5.3)
LYMPHOCYTES NFR BLD AUTO: 18 %
MCH RBC QN AUTO: 30 PG (ref 26.5–33)
MCHC RBC AUTO-ENTMCNC: 32.5 G/DL (ref 31.5–36.5)
MCV RBC AUTO: 92 FL (ref 78–100)
MONOCYTES # BLD AUTO: 0.3 10E9/L (ref 0–1.3)
MONOCYTES NFR BLD AUTO: 3.9 %
NEUTROPHILS # BLD AUTO: 4.9 10E9/L (ref 1.6–8.3)
NEUTROPHILS NFR BLD AUTO: 77.2 %
NRBC # BLD AUTO: 0 10*3/UL
NRBC BLD AUTO-RTO: 0 /100
PLATELET # BLD AUTO: 212 10E9/L (ref 150–450)
RBC # BLD AUTO: 4.56 10E12/L (ref 3.8–5.2)
WBC # BLD AUTO: 6.4 10E9/L (ref 4–11)

## 2018-07-11 PROCEDURE — P9041 ALBUMIN (HUMAN),5%, 50ML: HCPCS | Mod: ZF | Performed by: STUDENT IN AN ORGANIZED HEALTH CARE EDUCATION/TRAINING PROGRAM

## 2018-07-11 PROCEDURE — 25000125 ZZHC RX 250: Mod: ZF | Performed by: STUDENT IN AN ORGANIZED HEALTH CARE EDUCATION/TRAINING PROGRAM

## 2018-07-11 PROCEDURE — 85025 COMPLETE CBC W/AUTO DIFF WBC: CPT | Performed by: PATHOLOGY

## 2018-07-11 PROCEDURE — 85384 FIBRINOGEN ACTIVITY: CPT | Performed by: PATHOLOGY

## 2018-07-11 PROCEDURE — 85610 PROTHROMBIN TIME: CPT | Performed by: PATHOLOGY

## 2018-07-11 PROCEDURE — 25000128 H RX IP 250 OP 636: Mod: ZF | Performed by: STUDENT IN AN ORGANIZED HEALTH CARE EDUCATION/TRAINING PROGRAM

## 2018-07-11 PROCEDURE — 36514 APHERESIS PLASMA: CPT | Mod: ZF

## 2018-07-11 RX ORDER — HEPARIN SODIUM 1000 [USP'U]/ML
3 INJECTION, SOLUTION INTRAVENOUS; SUBCUTANEOUS
Status: COMPLETED | OUTPATIENT
Start: 2018-07-11 | End: 2018-07-11

## 2018-07-11 RX ORDER — CARBAMAZEPINE 200 MG/1
200 CAPSULE, EXTENDED RELEASE ORAL DAILY
Qty: 90 CAPSULE | Refills: 0 | Status: SHIPPED | OUTPATIENT
Start: 2018-07-11 | End: 2018-07-15

## 2018-07-11 RX ORDER — ALBUMIN HUMAN 25 %
2500 INTRAVENOUS SOLUTION INTRAVENOUS
Status: COMPLETED | OUTPATIENT
Start: 2018-07-11 | End: 2018-07-11

## 2018-07-11 RX ORDER — CALCIUM GLUCONATE 100 MG/ML
AMPUL (ML) INTRAVENOUS
Status: COMPLETED | OUTPATIENT
Start: 2018-07-11 | End: 2018-07-11

## 2018-07-11 RX ADMIN — CALCIUM GLUCONATE 5 G: 98 INJECTION, SOLUTION INTRAVENOUS at 14:09

## 2018-07-11 RX ADMIN — ANTICOAGULANT CITRATE DEXTROSE SOLUTION FORMULA A 553 ML: 12.25; 11; 3.65 SOLUTION INTRAVENOUS at 14:11

## 2018-07-11 RX ADMIN — HEPARIN SODIUM 3000 UNITS: 1000 INJECTION, SOLUTION INTRAVENOUS; SUBCUTANEOUS at 15:27

## 2018-07-11 RX ADMIN — HEPARIN SODIUM 3000 UNITS: 1000 INJECTION, SOLUTION INTRAVENOUS; SUBCUTANEOUS at 15:25

## 2018-07-11 RX ADMIN — ALBUMIN (HUMAN) 2500 ML: 12.5 INJECTION, SOLUTION INTRAVENOUS at 14:09

## 2018-07-11 NOTE — DISCHARGE INSTRUCTIONS

## 2018-07-11 NOTE — PROCEDURES
Transfusion Medicine  Apheresis Procedure Note    Martina Kim [id number]   YOB: 1955 Age: 62 year old        Procedure: Therapeutic plasma exchange           Assessment and Plan:   62 year old female undergoes therapeutic plasma exchange for myasthenia gravis.  The plan is to conduct a series of approximately 5 exchanges, approximately every other day, using 5% albumin as the replacement fluid.      Today was procedure #2 of the planned 5 procedures.  She tolerated the procedure well without complication.  Prior to the procedure, IR had evaluated the line.  She had continued to have pain since its placement.  The assessment was that the patient's line was against the collarbone, she will maintain using some tylenol and ice packs as needed.  She is otherwise feeling well.  Denies nausea, vomiting, fevers, chills, diarrhea.  She felt tired yesterday.  She notes that her leg fasiculations were about 75% better, and she may even feel a little stronger.    Please do not start or continue ace-inhibitors throughout the duration of a therapeutic plasma exchange series. Please notify the apheresis physician of any upcoming procedures, surgeries, or biopsies as therapeutic plasma exchange will affect coagulation factors.         Chief Complaint:   Weakness         History of Present Illness:   62 year old female presents for therapeutic plasma exchange.  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 The patient underwent plasma exchange in December 2017 with good relief of her symptoms.  She now presents with worsening of her typical symptoms, including fatigue, hoarseness, shortness of breath, and lower extremity weakness, as well as more stiffness in her legs than she has had in the past.  The  patient also has a history of hypertension, hyperlipidemia, kidney stone, and chronic back pain due to scolioss.               Past Medical History:     Past Medical History:   Diagnosis Date     Gastro-oesophageal reflux disease      Hypertension      Myasthenia gravis (H)      Myasthenia gravis (H)      Scoliosis, congenital              Past Surgical History:     Past Surgical History:   Procedure Laterality Date     APPENDECTOMY       BIOPSY       BREAST SURGERY       ENT SURGERY       GYN SURGERY       INSERT CATHETER VASCULAR ACCESS Right 12/20/2017    Procedure: INSERT CATHETER VASCULAR ACCESS;  Tunneled central venous catheter placement;  Surgeon: Alex Montes PA-C;  Location: [facility] OR     PHACOEMULSIFICATION CLEAR CORNEA WITH STANDARD INTRAOCULAR LENS IMPLANT Left 4/8/2015    Procedure: PHACOEMULSIFICATION CLEAR CORNEA WITH STANDARD INTRAOCULAR LENS IMPLANT;  Surgeon: Perfecto Arrieta MD;  Location:  [facility]   Thymectomy  Hysterectomy           Social History:     Social History   Substance Use Topics     Smoking status: Former Smoker     Types: Cigarettes     Quit date: 10/27/1996     Smokeless tobacco: Former User     Alcohol use 3.0 oz/week     1 Glasses of wine, 2 Cans of beer, 2 Standard drinks or equivalent per week      Comment: WEEKLY             Family History:     Family History   Problem Relation Age of Onset     C.A.D. Mother      Alzheimer Disease Father            Allergies:     Allergies   Allergen Reactions     Bee Venom Anaphylaxis     Fentanyl And Related Nausea and Vomiting     Intractable vomiting     Wasp Venom Protein Anaphylaxis     Codeine Nausea and Vomiting     Fosamax [Alendronic Acid]      Other reaction(s): GI Upset  GERD     Morphine Nausea and Vomiting     Morphine Hcl Nausea and Vomiting     Pt states she is allergic to ALL Narcotics.     Oxycodone Nausea and Vomiting     Percocet [Oxycodone-Acetaminophen] Nausea and Vomiting     Phenytoin Nausea and Vomiting     Other  reaction(s): GI Upset     Contrast Dye Rash     Other reaction(s): Intolerance-Can't Take  Worsens MG symptoms  Worsens MG symptoms             Medications:     Current Outpatient Prescriptions   Medication     AMLODIPINE BESYLATE PO     amLODIPine-benazepril (LOTREL) 2.5-10 MG per capsule     Ascorbic Acid (VITAMIN C PO)     aspirin 81 MG tablet     bimatoprost (LUMIGAN) 0.01 % SOLN     calcium carbonate (OS-LINDEN 500 MG [place]. [place]) 500 MG tablet     carBAMazepine (CARBATROL) 200 MG 12 hr capsule     carBAMazepine (CARBATROL) 200 MG 12 hr capsule     Cholecalciferol (VITAMIN D PO)     citalopram (CELEXA) 20 MG tablet     clonazePAM (KLONOPIN) 1 MG tablet     cyclobenzaprine (FLEXERIL) 10 MG tablet     fluorometholone (FML LIQUIFILM) 0.1 % ophthalmic susp     LANsoprazole (PREVACID) 30 MG capsule     spironolactone (ALDACTONE) 100 MG tablet     Thiamine HCl (VITAMIN  B-1) 50 MG tablet     VITAMIN A PO     VITAMIN E     CELLCEPT (BRAND) 500 MG TABLET     diphenhydrAMINE (BENADRYL) 50 MG capsule     HEALON 10 MG/ML SOLN intra-ocular inj     IBUPROFEN PO     methylPREDNISolone sodium succinate (SOLU-MEDROL) 125 mg/2 mL injection     order for DME     predniSONE (DELTASONE) 5 MG tablet     No current facility-administered medications for this encounter.      Facility-Administered Medications Ordered in Other Encounters   Medication     lidocaine (PF) (XYLOCAINE) 1 % injection 300 mg               Review of Systems:   See above           Exam:     /71  Pulse 71  Temp 98  F (36.7  C) (Oral)  Resp 18  Alert, no acute distress  Breathing appears comfortable on room air.  Tunneled central line accessed for the procedure.            Data:     BMP    Recent Labs  Lab 07/09/18  0840      POTASSIUM 4.1   CHLORIDE 108   LINDEN 9.1   CO2 26   BUN 25   CR 0.78   GLC 98     CBC    Recent Labs  Lab 07/11/18  1355 07/09/18  0840   WBC 6.4 5.3   RBC 4.56 4.57   HGB 13.7 13.9   HCT 42.1 43.0   MCV 92 94   MCH 30.0 30.4   MCHC  32.5 32.3   RDW 12.2 12.9    220     INR    Recent Labs  Lab 07/11/18  1355 07/09/18  0840   INR 1.10 0.89           Procedure Summary:   A single volume therapeutic plasma exchange was performed and 5% albumin was used as the replacement fluid.  A dual lumen central line was used for access and allowed for appropriate flow during the procedure.  ACD-A was used for anticoagulation. To offset the effects of the citrate, calcium gluconate was given in the return line.  The patient's vital signs were stable throughout.  The patient tolerated the procedure well without complication.  See apheresis flowsheet for additional details.      Attestation: During the procedure the patient was directly seen and evaluated by me, Alex Terry MD.    Alex Terry MD  Transfusion Medicine Attending  Laboratory Medicine & Pathology  Pager: [phone]

## 2018-07-11 NOTE — PROGRESS NOTES
CC: Pain around tunneled CVC    Indication/History:  Martina Kim is a 62 year old female with history of myasthenia gravis. IR right tunneled central venous catheter placed 7/9/18 for plasma pheresis. IR contacted to evaluate line due to patient report of pain surrounding the catheter.     According to documentation the placement was uneventful. The patient has had lines placed before under local anesthesia and preferred to have this line placed again under local anesthetic only. She reports that the placement was much more painful this time. She is now c/o pain along the course of the catheter with the most pain over her clavicle. She states that her right neck has been very swollen and bruised, but better today. She says ice and Tylenol have helped resolved the pain slightly. She has also been cleaning the skin surrounding the dressing with Hibiclens to prevent an infection.    Exam:  Constitutional: alert and no distress   Neck: No swelling noted, slight bruising at the base of the right neck near venotomy site. Dermabond intact. Incision healing well.  Chest: Right TCVC in place on right. Dressing with some dried dark blood. Line aspirates and flushes freely. No visible bleeding. Tenderness to palpation over the catheter, most tender over clavicle.    Imaging Reviewed:  Line placement 7/9/18     Impression/Plan:  Martina Kim is a 62 year old female with right tunneled central venous catheter placed 7/9/18 for plasma pheresis. The line was evaluated at bedside.     The catheter was without kinks. There was mild bruising at the neck venotomy site with tenderness along the catheter course. The cuff appears to be at the inferior margin of the clavicle and its the most tender area.  Dressing was removed and new sterile prepped. Line worked well. No pain from patient during use.     The line is safe to use.  Continue with regular dressing changes. The cuff over the clavicle is likely the reason for  her discomfort although I suspect this will diminish as the tract heals. Apply ice and tylenol PRN.    Please reach out to IR if any further concerns. Findings communicated to Dr. Vitale.       =====    Past Medical History:  Past Medical History:   Diagnosis Date     Gastro-oesophageal reflux disease      Hypertension      Myasthenia gravis (H)      Myasthenia gravis (H)      Scoliosis, congenital      Results Reviewed:  Lab Results   Component Value Date     07/09/2018     Lab Results   Component Value Date    INR 0.89 07/09/2018       Vital Signs:  There were no vitals taken for this visit.    =====    Ronit Calderon PA-C  Interventional Radiology  [phone] (IR control)  [phone] (pager)

## 2018-07-13 ENCOUNTER — HOSPITAL ENCOUNTER (OUTPATIENT)
Dept: LAB | Facility: CLINIC | Age: 63
Discharge: HOME | End: 2018-07-13
Attending: PSYCHIATRY & NEUROLOGY | Admitting: PSYCHIATRY & NEUROLOGY
Payer: COMMERCIAL

## 2018-07-13 VITALS
RESPIRATION RATE: 18 BRPM | TEMPERATURE: 97.9 F | SYSTOLIC BLOOD PRESSURE: 142 MMHG | HEART RATE: 81 BPM | DIASTOLIC BLOOD PRESSURE: 72 MMHG

## 2018-07-13 PROCEDURE — P9041 ALBUMIN (HUMAN),5%, 50ML: HCPCS | Mod: ZF | Performed by: PATHOLOGY

## 2018-07-13 PROCEDURE — 36514 APHERESIS PLASMA: CPT | Mod: ZF

## 2018-07-13 PROCEDURE — 25000125 ZZHC RX 250: Mod: ZF | Performed by: PATHOLOGY

## 2018-07-13 PROCEDURE — 25000128 H RX IP 250 OP 636: Mod: ZF | Performed by: PATHOLOGY

## 2018-07-13 RX ORDER — HEPARIN SODIUM 1000 [USP'U]/ML
3 INJECTION, SOLUTION INTRAVENOUS; SUBCUTANEOUS
Status: COMPLETED | OUTPATIENT
Start: 2018-07-13 | End: 2018-07-13

## 2018-07-13 RX ORDER — ALBUMIN HUMAN 25 %
2500 INTRAVENOUS SOLUTION INTRAVENOUS
Status: COMPLETED | OUTPATIENT
Start: 2018-07-13 | End: 2018-07-13

## 2018-07-13 RX ORDER — CALCIUM GLUCONATE 100 MG/ML
AMPUL (ML) INTRAVENOUS
Status: COMPLETED | OUTPATIENT
Start: 2018-07-13 | End: 2018-07-13

## 2018-07-13 RX ADMIN — ANTICOAGULANT CITRATE DEXTROSE SOLUTION FORMULA A: 12.25; 11; 3.65 SOLUTION INTRAVENOUS at 08:18

## 2018-07-13 RX ADMIN — ALBUMIN (HUMAN) 2500 ML: 12.5 INJECTION, SOLUTION INTRAVENOUS at 08:18

## 2018-07-13 RX ADMIN — CALCIUM GLUCONATE 3.2 G: 98 INJECTION, SOLUTION INTRAVENOUS at 08:22

## 2018-07-13 RX ADMIN — HEPARIN SODIUM 3000 UNITS: 1000 INJECTION, SOLUTION INTRAVENOUS; SUBCUTANEOUS at 10:10

## 2018-07-13 NOTE — DISCHARGE INSTRUCTIONS
Apheresis Blood Donor Center Post Instructions  You may feel tired after your procedure today.   Please call your doctor if you have:  bleeding that doesn t stop, fever, pain where a needle or tube (catheter) was placed, seizures, trouble breathing, red urine, nausea or vomiting, other health concerns.     If your symptoms are severe, call [phone].  If you have a Central Venous Catheter:  Notify your doctor if you have had a fever, chills, shaking  or redness, warmth, swelling, drainage at the exit-site.  This could be a sign of infection.    The Apheresis/Blood Donor Center is open Monday-Friday 7:30 a.m. to 5 p.m.  The phone number is [phone].  A Transfusion Medicine physician can be reached after 5:00 p.m. weekdays and on weekends /Holidays by calling [phone], and asking for the physician on call.      Plasma exchange:  If you received blood products (plasma or red blood cells) as part of your treatment, you need to be aware that transfusion reactions can occur up to several hours after they have been given to you.  Call your physician if you experience any symptoms in the next 48 hours, including: breathing problems, rash, itching, hives, nausea or vomiting, fever or chills, blood in your urine or stools, or joint pain.  Please inform the Transfusion Medicine Physician by calling [phone] and asking for the physician on call.  If you received albumin as part of your treatment (this is the most common), some of your clotting factors have been removed.  You body will replace these factors, but you could be at a slight risk for bleeding.  Please inform us if you have had any bleeding or a recent invasive procedure, such as a biopsy or surgery.    Certain medications that lower your blood pressure (ace inhibitors) such as Lisinopril are contraindicated while you are receiving plasma exchange.  Please inform us if you have started taking this medication during your plasma exchange series.

## 2018-07-14 NOTE — PROCEDURES
Transfusion Medicine  Apheresis Procedure Note    Martina Kim [id number]   YOB: 1955 Age: 62 year old        Procedure: Therapeutic plasma exchange           Assessment and Plan:   62 year old female undergoes therapeutic plasma exchange for myasthenia gravis.  The plan is to conduct a series of approximately 5 exchanges, approximately every other day, using 5% albumin as the replacement fluid.      Today was procedure #3 of the planned 5 procedures.  She tolerated the procedure well without complication. She is feeling well today.  Denies nausea, vomiting, fevers, chills    Please do not start or continue ace-inhibitors throughout the duration of a therapeutic plasma exchange series. Please notify the apheresis physician of any upcoming procedures, surgeries, or biopsies as therapeutic plasma exchange will affect coagulation factors.         Chief Complaint:   Weakness         History of Present Illness:   62 year old female presents for therapeutic plasma exchange.  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 The patient underwent plasma exchange in December 2017 with good relief of her symptoms.  She now presents with worsening of her typical symptoms, including fatigue, hoarseness, shortness of breath, and lower extremity weakness, as well as more stiffness in her legs than she has had in the past.  The patient also has a history of hypertension, hyperlipidemia, kidney stone, and chronic back pain due to scolioss.               Past Medical History:     Past Medical History:   Diagnosis Date     Gastro-oesophageal reflux disease      Hypertension      Myasthenia gravis (H)      Myasthenia gravis (H)      Scoliosis, congenital              Past Surgical History:     Past Surgical  History:   Procedure Laterality Date     APPENDECTOMY       BIOPSY       BREAST SURGERY       ENT SURGERY       GYN SURGERY       INSERT CATHETER VASCULAR ACCESS Right 12/20/2017    Procedure: INSERT CATHETER VASCULAR ACCESS;  Tunneled central venous catheter placement;  Surgeon: Alex Montes PA-C;  Location: [facility] OR     PHACOEMULSIFICATION CLEAR CORNEA WITH STANDARD INTRAOCULAR LENS IMPLANT Left 4/8/2015    Procedure: PHACOEMULSIFICATION CLEAR CORNEA WITH STANDARD INTRAOCULAR LENS IMPLANT;  Surgeon: Perfecto Arrieta MD;  Location: [facility]   Thymectomy  Hysterectomy           Social History:     Social History   Substance Use Topics     Smoking status: Former Smoker     Types: Cigarettes     Quit date: 10/27/1996     Smokeless tobacco: Former User     Alcohol use 3.0 oz/week     1 Glasses of wine, 2 Cans of beer, 2 Standard drinks or equivalent per week      Comment: WEEKLY             Family History:     Family History   Problem Relation Age of Onset     C.A.D. Mother      Alzheimer Disease Father            Allergies:     Allergies   Allergen Reactions     Bee Venom Anaphylaxis     Fentanyl And Related Nausea and Vomiting     Intractable vomiting     Wasp Venom Protein Anaphylaxis     Codeine Nausea and Vomiting     Fosamax [Alendronic Acid]      Other reaction(s): GI Upset  GERD     Morphine Nausea and Vomiting     Morphine Hcl Nausea and Vomiting     Pt states she is allergic to ALL Narcotics.     Oxycodone Nausea and Vomiting     Percocet [Oxycodone-Acetaminophen] Nausea and Vomiting     Phenytoin Nausea and Vomiting     Other reaction(s): GI Upset     Contrast Dye Rash     Other reaction(s): Intolerance-Can't Take  Worsens MG symptoms  Worsens MG symptoms             Medications:     Current Outpatient Prescriptions   Medication     AMLODIPINE BESYLATE PO     amLODIPine-benazepril (LOTREL) 2.5-10 MG per capsule     Ascorbic Acid (VITAMIN C PO)     aspirin 81 MG tablet     bimatoprost (LUMIGAN) 0.01 % SOLN      calcium carbonate (OS-ILNDEN 500 MG [place]. [place]) 500 MG tablet     carBAMazepine (CARBATROL) 200 MG 12 hr capsule     carBAMazepine (CARBATROL) 200 MG 12 hr capsule     Cholecalciferol (VITAMIN D PO)     citalopram (CELEXA) 20 MG tablet     clonazePAM (KLONOPIN) 1 MG tablet     cyclobenzaprine (FLEXERIL) 10 MG tablet     fluorometholone (FML LIQUIFILM) 0.1 % ophthalmic susp     LANsoprazole (PREVACID) 30 MG capsule     order for DME     predniSONE (DELTASONE) 5 MG tablet     Thiamine HCl (VITAMIN  B-1) 50 MG tablet     VITAMIN A PO     VITAMIN E     CELLCEPT (BRAND) 500 MG TABLET     diphenhydrAMINE (BENADRYL) 50 MG capsule     HEALON 10 MG/ML SOLN intra-ocular inj     IBUPROFEN PO     methylPREDNISolone sodium succinate (SOLU-MEDROL) 125 mg/2 mL injection     spironolactone (ALDACTONE) 100 MG tablet     No current facility-administered medications for this encounter.      Facility-Administered Medications Ordered in Other Encounters   Medication     lidocaine (PF) (XYLOCAINE) 1 % injection 300 mg               Review of Systems:   See above           Exam:     /72  Pulse 81  Temp 97.9  F (36.6  C) (Oral)  Resp 18  Alert, no acute distress  Breathing appears comfortable on room air.  Tunneled central line accessed for the procedure.            Data:     BMP    Recent Labs  Lab 07/09/18  0840      POTASSIUM 4.1   CHLORIDE 108   LINDEN 9.1   CO2 26   BUN 25   CR 0.78   GLC 98     CBC    Recent Labs  Lab 07/11/18  1355 07/09/18  0840   WBC 6.4 5.3   RBC 4.56 4.57   HGB 13.7 13.9   HCT 42.1 43.0   MCV 92 94   MCH 30.0 30.4   MCHC 32.5 32.3   RDW 12.2 12.9    220     INR    Recent Labs  Lab 07/11/18  1355 07/09/18  0840   INR 1.10 0.89           Procedure Summary:   A single volume therapeutic plasma exchange was performed and 5% albumin was used as the replacement fluid.  A dual lumen central line was used for access and allowed for appropriate flow during the procedure.  ACD-A was used for  anticoagulation. To offset the effects of the citrate, calcium gluconate was given in the return line.  The patient's vital signs were stable throughout.  The patient tolerated the procedure well without complication.  See apheresis flowsheet for additional details.      Attestation: During the procedure the patient was directly seen and evaluated by me, Alex Terry MD.    Alex Terry MD  Transfusion Medicine Attending  Laboratory Medicine & Pathology  Pager: [phone]

## 2018-07-16 ENCOUNTER — TELEPHONE (OUTPATIENT)
Dept: INTERVENTIONAL RADIOLOGY/VASCULAR | Facility: CLINIC | Age: 63
End: 2018-07-16

## 2018-07-16 ENCOUNTER — HOSPITAL ENCOUNTER (OUTPATIENT)
Dept: LAB | Facility: CLINIC | Age: 63
Discharge: HOME | End: 2018-07-16
Attending: PSYCHIATRY & NEUROLOGY | Admitting: PSYCHIATRY & NEUROLOGY
Payer: COMMERCIAL

## 2018-07-16 VITALS
TEMPERATURE: 98.5 F | RESPIRATION RATE: 16 BRPM | HEART RATE: 79 BPM | DIASTOLIC BLOOD PRESSURE: 68 MMHG | SYSTOLIC BLOOD PRESSURE: 123 MMHG

## 2018-07-16 LAB
BASOPHILS # BLD AUTO: 0 10E9/L (ref 0–0.2)
BASOPHILS NFR BLD AUTO: 0.5 %
DIFFERENTIAL METHOD BLD: NORMAL
EOSINOPHIL # BLD AUTO: 0.1 10E9/L (ref 0–0.7)
EOSINOPHIL NFR BLD AUTO: 0.9 %
ERYTHROCYTE [DISTWIDTH] IN BLOOD BY AUTOMATED COUNT: 12.3 % (ref 10–15)
FIBRINOGEN PPP-MCNC: 171 MG/DL (ref 200–420)
HCT VFR BLD AUTO: 39.3 % (ref 35–47)
HGB BLD-MCNC: 13.2 G/DL (ref 11.7–15.7)
IMM GRANULOCYTES # BLD: 0 10E9/L (ref 0–0.4)
IMM GRANULOCYTES NFR BLD: 0.5 %
INR PPP: 1.06 (ref 0.86–1.14)
LYMPHOCYTES # BLD AUTO: 1.1 10E9/L (ref 0.8–5.3)
LYMPHOCYTES NFR BLD AUTO: 13.7 %
MCH RBC QN AUTO: 30.6 PG (ref 26.5–33)
MCHC RBC AUTO-ENTMCNC: 33.6 G/DL (ref 31.5–36.5)
MCV RBC AUTO: 91 FL (ref 78–100)
MONOCYTES # BLD AUTO: 0.4 10E9/L (ref 0–1.3)
MONOCYTES NFR BLD AUTO: 4.8 %
NEUTROPHILS # BLD AUTO: 6.3 10E9/L (ref 1.6–8.3)
NEUTROPHILS NFR BLD AUTO: 79.6 %
NRBC # BLD AUTO: 0 10*3/UL
NRBC BLD AUTO-RTO: 0 /100
PLATELET # BLD AUTO: 152 10E9/L (ref 150–450)
RBC # BLD AUTO: 4.32 10E12/L (ref 3.8–5.2)
WBC # BLD AUTO: 8 10E9/L (ref 4–11)

## 2018-07-16 PROCEDURE — P9041 ALBUMIN (HUMAN),5%, 50ML: HCPCS | Mod: ZF | Performed by: PATHOLOGY

## 2018-07-16 PROCEDURE — 85610 PROTHROMBIN TIME: CPT | Performed by: PATHOLOGY

## 2018-07-16 PROCEDURE — 36514 APHERESIS PLASMA: CPT | Mod: ZF

## 2018-07-16 PROCEDURE — 85384 FIBRINOGEN ACTIVITY: CPT | Performed by: PATHOLOGY

## 2018-07-16 PROCEDURE — 25000128 H RX IP 250 OP 636: Mod: ZF | Performed by: PATHOLOGY

## 2018-07-16 PROCEDURE — 25000132 ZZH RX MED GY IP 250 OP 250 PS 637: Mod: ZF | Performed by: PATHOLOGY

## 2018-07-16 PROCEDURE — 25000125 ZZHC RX 250: Mod: ZF | Performed by: PATHOLOGY

## 2018-07-16 PROCEDURE — 85025 COMPLETE CBC W/AUTO DIFF WBC: CPT | Performed by: PATHOLOGY

## 2018-07-16 RX ORDER — HEPARIN SODIUM 1000 [USP'U]/ML
3 INJECTION, SOLUTION INTRAVENOUS; SUBCUTANEOUS
Status: CANCELLED | OUTPATIENT
Start: 2018-07-16

## 2018-07-16 RX ORDER — ACETAMINOPHEN 325 MG/1
650 TABLET ORAL ONCE
Status: COMPLETED | OUTPATIENT
Start: 2018-07-16 | End: 2018-07-16

## 2018-07-16 RX ORDER — HEPARIN SODIUM 1000 [USP'U]/ML
3 INJECTION, SOLUTION INTRAVENOUS; SUBCUTANEOUS
Status: COMPLETED | OUTPATIENT
Start: 2018-07-16 | End: 2018-07-16

## 2018-07-16 RX ORDER — CALCIUM GLUCONATE 100 MG/ML
AMPUL (ML) INTRAVENOUS
Status: COMPLETED | OUTPATIENT
Start: 2018-07-16 | End: 2018-07-16

## 2018-07-16 RX ORDER — ALBUMIN HUMAN 25 %
2500 INTRAVENOUS SOLUTION INTRAVENOUS
Status: COMPLETED | OUTPATIENT
Start: 2018-07-16 | End: 2018-07-16

## 2018-07-16 RX ADMIN — ANTICOAGULANT CITRATE DEXTROSE SOLUTION FORMULA A: 12.25; 11; 3.65 SOLUTION INTRAVENOUS at 12:49

## 2018-07-16 RX ADMIN — ALBUMIN (HUMAN) 2500 ML: 12.5 INJECTION, SOLUTION INTRAVENOUS at 12:48

## 2018-07-16 RX ADMIN — CALCIUM GLUCONATE 3 G: 98 INJECTION, SOLUTION INTRAVENOUS at 12:49

## 2018-07-16 RX ADMIN — HEPARIN SODIUM 3000 UNITS: 1000 INJECTION, SOLUTION INTRAVENOUS; SUBCUTANEOUS at 14:22

## 2018-07-16 RX ADMIN — ACETAMINOPHEN 650 MG: 325 TABLET ORAL at 13:20

## 2018-07-16 NOTE — DISCHARGE INSTRUCTIONS
Plasma exchange:    If you received albumin as part of your treatment (this is the most common), some of your clotting factors have been removed.  You body will replace these factors, but you could be at a slight risk for bleeding.  Please inform us if you have had any bleeding or a recent invasive procedure, such as a biopsy or surgery.    Certain medications that lower your blood pressure (ace inhibitors) such as Lisinopril are contraindicated while you are receiving plasma exchange.  Please inform us if you have started taking this medication during your plasma exchange series.

## 2018-07-16 NOTE — PROCEDURES
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 Today is the 4th procedure  Of 5. Last one is scheduled for Wednesday 7/18    Please do not start or continue ace-inhibitors throughout the duration of a therapeutic plasma exchange series. Please notify the apheresis physician of any upcoming procedures, surgeries, or biopsies as therapeutic plasma exchange will affect coagulation factors.    Attestation:   This patient has been seen and evaluated by me, Doug Quiros.        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 She underwent plasma exchange in December 2017 with good relief of her symptoms.      She now presents with worsening of her typical symptoms, including fatigue, hoarseness, shortness of breath, and lower extremity weakness, as well as more stiffness in her legs than she has had in the past and we are providing 5 PLEX's ~ qod for this recent episode.         Past Medical History:     Past Medical History:   Diagnosis Date     Gastro-oesophageal reflux disease      Hypertension      Myasthenia gravis (H)      Myasthenia gravis (H)       Scoliosis, congenital           Past Surgical History:     Past Surgical History:   Procedure Laterality Date     APPENDECTOMY       BIOPSY       BREAST SURGERY       ENT SURGERY       GYN SURGERY       INSERT CATHETER VASCULAR ACCESS Right 12/20/2017    Procedure: INSERT CATHETER VASCULAR ACCESS;  Tunneled central venous catheter placement;  Surgeon: Alex Montes PA-C;  Location: [facility]     PHACOEMULSIFICATION CLEAR CORNEA WITH STANDARD INTRAOCULAR LENS IMPLANT Left 4/8/2015    Procedure: PHACOEMULSIFICATION CLEAR CORNEA WITH STANDARD INTRAOCULAR LENS IMPLANT;  Surgeon: Perfecto Arrieta MD;  [facility]          Social History:     Social History   Substance Use Topics     Smoking status: Former Smoker     Types: Cigarettes     Quit date: 10/27/1996     Smokeless tobacco: Former User     Alcohol use 3.0 oz/week     1 Glasses of wine, 2 Cans of beer, 2 Standard drinks or equivalent per week      Comment: WEEKLY          Allergies:     Allergies   Allergen Reactions     Bee Venom Anaphylaxis     Fentanyl And Related Nausea and Vomiting     Intractable vomiting     Wasp Venom Protein Anaphylaxis     Codeine Nausea and Vomiting     Fosamax [Alendronic Acid]      Other reaction(s): GI Upset  GERD     Morphine Nausea and Vomiting     Morphine Hcl Nausea and Vomiting     Pt states she is allergic to ALL Narcotics.     Oxycodone Nausea and Vomiting     Percocet [Oxycodone-Acetaminophen] Nausea and Vomiting     Phenytoin Nausea and Vomiting     Other reaction(s): GI Upset     Contrast Dye Rash     Other reaction(s): Intolerance-Can't Take  Worsens MG symptoms  Worsens MG symptoms          Medications:     Current Outpatient Prescriptions   Medication Sig     AMLODIPINE BESYLATE PO Take 10 mg by mouth daily     amLODIPine-benazepril (LOTREL) 2.5-10 MG per capsule Take 1 capsule by mouth daily     Ascorbic Acid (VITAMIN C PO) Take 1,000 mg by mouth daily     aspirin 81 MG tablet Take  by mouth daily. 1 Tablet  Daily     bimatoprost (LUMIGAN) 0.01 % SOLN Place 1 drop into both eyes daily.      calcium carbonate (OS-LINDEN 500 MG [place]. [place]) 500 MG tablet Take 500 mg by mouth 2 times daily     carBAMazepine (CARBATROL) 200 MG 12 hr capsule Take 1 capsule (200 mg) by mouth daily     CELLCEPT (BRAND) 500 MG TABLET Take 2 tablets (1,000 mg) by mouth 2 times daily     Cholecalciferol (VITAMIN D PO) Take 5,000 Units by mouth daily 1 Tablet Daily     citalopram (CELEXA) 20 MG tablet TAKE ONE TABLET BY MOUTH ONCE DAILY     clonazePAM (KLONOPIN) 1 MG tablet Take 1-2 tablets at bedtime as needed for sleep     cyclobenzaprine (FLEXERIL) 10 MG tablet TAKE ONE TABLET BY MOUTH AT BEDTIME AS NEEDED FOR MUSCLE SPASM     diphenhydrAMINE (BENADRYL) 50 MG capsule Take 50 mg by mouth every 6 hours as needed Every 4 hours during infusion     fluorometholone (FML LIQUIFILM) 0.1 % ophthalmic susp Apply 1 drop to eye 3 times daily     HEALON 10 MG/ML SOLN intra-ocular inj Apply 1 Application topically 4 times daily     IBUPROFEN PO Take 800 mg by mouth every 8 hours as needed      LANsoprazole (PREVACID) 30 MG capsule Take 30 mg by mouth daily 1 Tablet Daily     methylPREDNISolone sodium succinate (SOLU-MEDROL) 125 mg/2 mL injection Inject 125 mg into the vein once     [facility] [place] AutoBiPAP 11/6 cm, ComfortGel Blue Pte nasal mask.     predniSONE (DELTASONE) 5 MG tablet 12.5mg daily (Patient taking differently: Take 15 mg by mouth daily )     spironolactone (ALDACTONE) 100 MG tablet Take 1 tablet (100 mg) by mouth daily Before infusion     Thiamine HCl (VITAMIN  B-1) 50 MG tablet Take 50 mg by mouth daily     VITAMIN A PO Take 8,000 Units by mouth daily 1 Tablet Daily     VITAMIN E 400 Units daily 1 Tablet Daily     Current Facility-Administered Medications   Medication     heparin Lock (1000 units/mL High concentration) 3,000 Units     heparin Lock (1000 units/mL High concentration) 3,000 Units     sodium chloride (PF) 0.9%  PF flush 10 mL     sodium chloride (PF) 0.9% PF flush 10 mL     Facility-Administered Medications Ordered in Other Encounters   Medication     lidocaine (PF) (XYLOCAINE) 1 % injection 300 mg           Abbreviated Physical Exam:   /79  Pulse 83  Temp 97.9  F (36.6  C) (Oral)  Resp 16  Patient Alert & Oriented and in No Acute Distress         Laboratory Data:   BMPNo lab results found in last 7 days.  CBC  Recent Labs  Lab 07/16/18  1240 07/11/18  1355   WBC 8.0 6.4   RBC 4.32 4.56   HGB 13.2 13.7   HCT 39.3 42.1   MCV 91 92   MCH 30.6 30.0   MCHC 33.6 32.5   RDW 12.3 12.2    212     INR  Recent Labs  Lab 07/16/18  1240 07/11/18  1355   INR 1.06 1.10       Fibrinogen   Date Value Ref Range Status   07/16/2018 171 (L) 200 - 420 mg/dL Final          Procedure Summary:   A single volume plasma exchange was performed with albumin. The R IJ CVC catheter was used for access. ACD-A was for anticoagulation. To offset the effects of the citrate, calcium gluconate was given in the return line. The patient's vital signs were stable throughout. The patient tolerated the procedure well.  Attestation:   This patient has been seen and evaluated by me, Doug Quiros.   Doug Quiros   Division of Transfusion Medicine   Department [place] [address]   Pager: [phone] or [phone]

## 2018-07-18 ENCOUNTER — HOSPITAL ENCOUNTER (OUTPATIENT)
Dept: LAB | Facility: CLINIC | Age: 63
Discharge: HOME | End: 2018-07-18
Attending: PSYCHIATRY & NEUROLOGY | Admitting: PSYCHIATRY & NEUROLOGY
Payer: COMMERCIAL

## 2018-07-18 VITALS
TEMPERATURE: 98.6 F | RESPIRATION RATE: 16 BRPM | HEART RATE: 70 BPM | SYSTOLIC BLOOD PRESSURE: 120 MMHG | DIASTOLIC BLOOD PRESSURE: 62 MMHG

## 2018-07-18 LAB
BASOPHILS # BLD AUTO: 0 10E9/L (ref 0–0.2)
BASOPHILS NFR BLD AUTO: 0.5 %
DIFFERENTIAL METHOD BLD: ABNORMAL
EOSINOPHIL # BLD AUTO: 0 10E9/L (ref 0–0.7)
EOSINOPHIL NFR BLD AUTO: 0.6 %
ERYTHROCYTE [DISTWIDTH] IN BLOOD BY AUTOMATED COUNT: 12.7 % (ref 10–15)
FIBRINOGEN PPP-MCNC: 130 MG/DL (ref 200–420)
HCT VFR BLD AUTO: 36.7 % (ref 35–47)
HGB BLD-MCNC: 12 G/DL (ref 11.7–15.7)
IMM GRANULOCYTES # BLD: 0 10E9/L (ref 0–0.4)
IMM GRANULOCYTES NFR BLD: 0.6 %
INR PPP: 1.06 (ref 0.86–1.14)
LYMPHOCYTES # BLD AUTO: 0.7 10E9/L (ref 0.8–5.3)
LYMPHOCYTES NFR BLD AUTO: 10.5 %
MCH RBC QN AUTO: 30.2 PG (ref 26.5–33)
MCHC RBC AUTO-ENTMCNC: 32.7 G/DL (ref 31.5–36.5)
MCV RBC AUTO: 92 FL (ref 78–100)
MONOCYTES # BLD AUTO: 0.3 10E9/L (ref 0–1.3)
MONOCYTES NFR BLD AUTO: 4.2 %
NEUTROPHILS # BLD AUTO: 5.4 10E9/L (ref 1.6–8.3)
NEUTROPHILS NFR BLD AUTO: 83.6 %
NRBC # BLD AUTO: 0 10*3/UL
NRBC BLD AUTO-RTO: 0 /100
PLATELET # BLD AUTO: 149 10E9/L (ref 150–450)
RBC # BLD AUTO: 3.97 10E12/L (ref 3.8–5.2)
WBC # BLD AUTO: 6.5 10E9/L (ref 4–11)

## 2018-07-18 PROCEDURE — 85025 COMPLETE CBC W/AUTO DIFF WBC: CPT | Performed by: PATHOLOGY

## 2018-07-18 PROCEDURE — 25000125 ZZHC RX 250: Mod: ZF | Performed by: PATHOLOGY

## 2018-07-18 PROCEDURE — 36514 APHERESIS PLASMA: CPT | Mod: ZF

## 2018-07-18 PROCEDURE — 25000132 ZZH RX MED GY IP 250 OP 250 PS 637: Mod: ZF | Performed by: PATHOLOGY

## 2018-07-18 PROCEDURE — P9041 ALBUMIN (HUMAN),5%, 50ML: HCPCS | Mod: ZF | Performed by: PATHOLOGY

## 2018-07-18 PROCEDURE — 85610 PROTHROMBIN TIME: CPT | Performed by: PATHOLOGY

## 2018-07-18 PROCEDURE — 85384 FIBRINOGEN ACTIVITY: CPT | Performed by: PATHOLOGY

## 2018-07-18 PROCEDURE — 25000128 H RX IP 250 OP 636: Mod: ZF | Performed by: PATHOLOGY

## 2018-07-18 RX ORDER — HEPARIN SODIUM 1000 [USP'U]/ML
3 INJECTION, SOLUTION INTRAVENOUS; SUBCUTANEOUS
Status: COMPLETED | OUTPATIENT
Start: 2018-07-18 | End: 2018-07-18

## 2018-07-18 RX ORDER — ACETAMINOPHEN 325 MG/1
650 TABLET ORAL ONCE
Status: COMPLETED | OUTPATIENT
Start: 2018-07-18 | End: 2018-07-18

## 2018-07-18 RX ORDER — ALBUMIN HUMAN 25 %
2500 INTRAVENOUS SOLUTION INTRAVENOUS
Status: COMPLETED | OUTPATIENT
Start: 2018-07-18 | End: 2018-07-18

## 2018-07-18 RX ORDER — CALCIUM GLUCONATE 100 MG/ML
AMPUL (ML) INTRAVENOUS
Status: COMPLETED | OUTPATIENT
Start: 2018-07-18 | End: 2018-07-18

## 2018-07-18 RX ADMIN — ACETAMINOPHEN 650 MG: 325 TABLET ORAL at 09:56

## 2018-07-18 RX ADMIN — CALCIUM GLUCONATE 3 G: 98 INJECTION, SOLUTION INTRAVENOUS at 09:29

## 2018-07-18 RX ADMIN — HEPARIN SODIUM 3000 UNITS: 1000 INJECTION, SOLUTION INTRAVENOUS; SUBCUTANEOUS at 10:44

## 2018-07-18 RX ADMIN — HEPARIN SODIUM 3000 UNITS: 1000 INJECTION, SOLUTION INTRAVENOUS; SUBCUTANEOUS at 10:45

## 2018-07-18 RX ADMIN — ANTICOAGULANT CITRATE DEXTROSE SOLUTION FORMULA A: 12.25; 11; 3.65 SOLUTION INTRAVENOUS at 09:28

## 2018-07-18 RX ADMIN — ALBUMIN (HUMAN) 2500 ML: 12.5 INJECTION, SOLUTION INTRAVENOUS at 09:28

## 2018-07-18 NOTE — PROCEDURES
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 Today was her last scheduled procedure. She had been scheduled to go to IR for her line pull right after  this PLEX, but after discussing  concerns about Post-procedure coagulopathy we have made a new appointment with IR for Monday.    Please do not start or continue ace-inhibitors throughout the duration of a therapeutic plasma exchange series. Please notify the apheresis physician of any upcoming procedures, surgeries, or biopsies as therapeutic plasma exchange will affect coagulation factors.  Attestation:   This patient has been seen and evaluated by me, Doug Quiros.        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 She underwent plasma exchange in December 2017 with good relief of her symptoms.    She now presents with worsening of her typical symptoms, including fatigue, hoarseness, shortness of breath, and lower extremity weakness, as well as more stiffness in her legs than she has had in the past and we are providing 5 PLEX's ~ every other day for this recent episode.  A single volume plasma  exchange was performed with albumin. The R IJ CVC catheter was used for access. ACD-A was for anticoagulation. To offset the effects of the citrate, calcium gluconate was given in the return line. The patient's vital signs were stable throughout. The patient tolerated the procedure well.       Past Medical History:     Past Medical History:   Diagnosis Date     Gastro-oesophageal reflux disease      Hypertension      Myasthenia gravis (H)      Myasthenia gravis (H)      Scoliosis, congenital            Past Surgical History:     Past Surgical History:   Procedure Laterality Date     APPENDECTOMY       BIOPSY       BREAST SURGERY       ENT SURGERY       GYN SURGERY       INSERT CATHETER VASCULAR ACCESS Right 12/20/2017    Procedure: INSERT CATHETER VASCULAR ACCESS;  Tunneled central venous catheter placement;  Surgeon: Alex Montes PA-C;  Location: [facility]     PHACOEMULSIFICATION CLEAR CORNEA WITH STANDARD INTRAOCULAR LENS IMPLANT Left 4/8/2015    Procedure: PHACOEMULSIFICATION CLEAR CORNEA WITH STANDARD INTRAOCULAR LENS IMPLANT;  Surgeon: Perfecto Arrieta MD;  Location: [facility]          Social History:     Social History   Substance Use Topics     Smoking status: Former Smoker     Types: Cigarettes     Quit date: 10/27/1996     Smokeless tobacco: Former User     Alcohol use 3.0 oz/week     1 Glasses of wine, 2 Cans of beer, 2 Standard drinks or equivalent per week      Comment: WEEKLY          Allergies:     Allergies   Allergen Reactions     Bee Venom Anaphylaxis     Fentanyl And Related Nausea and Vomiting     Intractable vomiting     Wasp Venom Protein Anaphylaxis     Codeine Nausea and Vomiting     Fosamax [Alendronic Acid]      Other reaction(s): GI Upset  GERD     Morphine Nausea and Vomiting     Morphine Hcl Nausea and Vomiting     Pt states she is allergic to ALL Narcotics.     Oxycodone Nausea and Vomiting     Percocet [Oxycodone-Acetaminophen] Nausea and Vomiting     Phenytoin Nausea and Vomiting      Other reaction(s): GI Upset     Contrast Dye Rash     Other reaction(s): Intolerance-Can't Take  Worsens MG symptoms  Worsens MG symptoms          Medications:     Current Outpatient Prescriptions   Medication Sig     AMLODIPINE BESYLATE PO Take 10 mg by mouth daily     amLODIPine-benazepril (LOTREL) 2.5-10 MG per capsule Take 1 capsule by mouth daily     Ascorbic Acid (VITAMIN C PO) Take 1,000 mg by mouth daily     aspirin 81 MG tablet Take  by mouth daily. 1 Tablet Daily     bimatoprost (LUMIGAN) 0.01 % SOLN Place 1 drop into both eyes daily.      calcium carbonate (OS-LINDEN 500 MG [place]. [place]) 500 MG tablet Take 500 mg by mouth 2 times daily     carBAMazepine (CARBATROL) 200 MG 12 hr capsule Take 1 capsule (200 mg) by mouth daily     Cholecalciferol (VITAMIN D PO) Take 5,000 Units by mouth daily 1 Tablet Daily     citalopram (CELEXA) 20 MG tablet TAKE ONE TABLET BY MOUTH ONCE DAILY     clonazePAM (KLONOPIN) 1 MG tablet Take 1-2 tablets at bedtime as needed for sleep     cyclobenzaprine (FLEXERIL) 10 MG tablet TAKE ONE TABLET BY MOUTH AT BEDTIME AS NEEDED FOR MUSCLE SPASM     diphenhydrAMINE (BENADRYL) 50 MG capsule Take 50 mg by mouth every 6 hours as needed Every 4 hours during infusion     fluorometholone (FML LIQUIFILM) 0.1 % ophthalmic susp Apply 1 drop to eye 3 times daily     HEALON 10 MG/ML SOLN intra-ocular inj Apply 1 Application topically 4 times daily     IBUPROFEN PO Take 800 mg by mouth every 8 hours as needed      LANsoprazole (PREVACID) 30 MG capsule Take 30 mg by mouth daily 1 Tablet Daily     methylPREDNISolone sodium succinate (SOLU-MEDROL) 125 mg/2 mL injection Inject 125 mg into the vein once     [facility] [place] AutoBiPAP 11/6 cm, ComfortGel Blue Pte nasal mask.     predniSONE (DELTASONE) 5 MG tablet 12.5mg daily (Patient taking differently: Take 15 mg by mouth daily )     spironolactone (ALDACTONE) 100 MG tablet Take 1 tablet (100 mg) by mouth daily Before infusion      Thiamine HCl (VITAMIN  B-1) 50 MG tablet Take 50 mg by mouth daily     VITAMIN A PO Take 8,000 Units by mouth daily 1 Tablet Daily     VITAMIN E 400 Units daily 1 Tablet Daily     CELLCEPT (BRAND) 500 MG TABLET Take 2 tablets (1,000 mg) by mouth 2 times daily     No current facility-administered medications for this encounter.      Facility-Administered Medications Ordered in Other Encounters   Medication     lidocaine (PF) (XYLOCAINE) 1 % injection 300 mg           Abbreviated Physical Exam:   /62  Pulse 70  Temp 98.6  F (37  C) (Oral)  Resp 16  Patient Alert & Oriented and in No Acute Distress       Laboratory Data:   BMPNo lab results found in last 7 days.  CBC  Recent Labs  Lab 07/18/18  0915 07/16/18  1240   WBC 6.5 8.0   RBC 3.97 4.32   HGB 12.0 13.2   HCT 36.7 39.3   MCV 92 91   MCH 30.2 30.6   MCHC 32.7 33.6   RDW 12.7 12.3   * 152     INR  Recent Labs  Lab 07/18/18  0915 07/16/18  1240   INR 1.06 1.06     Fibrinogen   Date Value Ref Range Status   07/18/2018 130 (L) 200 - 420 mg/dL Final          Procedure Summary:    A single volume plasma exchange was performed with albumin. The R IJ CVC catheter was used for access. ACD-A was for anticoagulation. To offset the effects of the citrate, calcium gluconate was given in the return line. The patient's vital signs were stable throughout. The patient tolerated the procedure well  Attestation:   This patient has been seen and evaluated by me, Doug Quiros.   Dogu Quiros   Division of Transfusion Medicine   [place] [address]   Pager: [phone] or [phone]

## 2018-07-19 ENCOUNTER — TELEPHONE (OUTPATIENT)
Dept: INTERVENTIONAL RADIOLOGY/VASCULAR | Facility: CLINIC | Age: 63
End: 2018-07-19

## 2018-07-19 DIAGNOSIS — R25.3 FASCICULATION: ICD-10-CM

## 2018-07-19 RX ORDER — CARBAMAZEPINE 200 MG/1
200 CAPSULE, EXTENDED RELEASE ORAL 2 TIMES DAILY
Qty: 180 CAPSULE | Refills: 3 | Status: SHIPPED | OUTPATIENT
Start: 2018-07-19 | End: 2019-11-18

## 2018-07-23 ENCOUNTER — APPOINTMENT (OUTPATIENT)
Dept: INTERVENTIONAL RADIOLOGY/VASCULAR | Facility: CLINIC | Age: 63
End: 2018-07-23
Attending: PSYCHIATRY & NEUROLOGY
Payer: COMMERCIAL

## 2018-07-23 ENCOUNTER — HOSPITAL ENCOUNTER (OUTPATIENT)
Facility: CLINIC | Age: 63
Discharge: HOME | End: 2018-07-23
Attending: PSYCHIATRY & NEUROLOGY | Admitting: PSYCHIATRY & NEUROLOGY
Payer: COMMERCIAL

## 2018-07-23 VITALS
DIASTOLIC BLOOD PRESSURE: 84 MMHG | RESPIRATION RATE: 16 BRPM | SYSTOLIC BLOOD PRESSURE: 132 MMHG | OXYGEN SATURATION: 98 %

## 2018-07-23 DIAGNOSIS — G70.01 MYASTHENIA GRAVIS WITH EXACERBATION (H): ICD-10-CM

## 2018-07-23 PROCEDURE — 36589 REMOVAL TUNNELED CV CATH: CPT | Mod: RT

## 2018-07-23 NOTE — PROGRESS NOTES
Interventional Radiology Intra-procedural Nursing Note    Patient Name: Martina Kim  Medical Record Number: [id number]  Today's Date: July 23, 2018    Procedure: tunneled central venous catheter removal    Attending MD in room during timeout:  Dr Santos  Proceduralist: Dr Santos    Procedure Start Time: 1145  Procedure Puncture time: 1147  Procedure End Time: 1155    Sedation start time:  Sedation end time:  Sedation medications given: none given    Report given to: not needed  : not needed    D: Patient brought to IR room 7. Verified Patient's ID using two identifiers.   A: Patient was positioned and was monitored per IR protocol. Patient tolerated the procedure without apparent incident. The dressing over the tunneled line removal site is clean, dry and intact. Reviewed post line removal instructions with Patient. She denied having questions or concerns regarding the instructions.   P: Patient was returned to the [facility] waiting room and was discharged home to self care.    Angelica Martínez RN  [phone]

## 2018-08-06 ENCOUNTER — OFFICE VISIT (OUTPATIENT)
Dept: NEUROLOGY | Facility: CLINIC | Age: 63
End: 2018-08-06
Payer: COMMERCIAL

## 2018-08-06 VITALS
HEIGHT: 64 IN | RESPIRATION RATE: 20 BRPM | BODY MASS INDEX: 27.08 KG/M2 | WEIGHT: 158.6 LBS | DIASTOLIC BLOOD PRESSURE: 82 MMHG | OXYGEN SATURATION: 96 % | SYSTOLIC BLOOD PRESSURE: 119 MMHG | HEART RATE: 98 BPM

## 2018-08-06 DIAGNOSIS — R00.0 TACHYCARDIA: ICD-10-CM

## 2018-08-06 DIAGNOSIS — G70.00 MYASTHENIA GRAVIS WITHOUT EXACERBATION (H): ICD-10-CM

## 2018-08-06 DIAGNOSIS — G70.00 MYASTHENIA GRAVIS WITHOUT EXACERBATION (H): Primary | ICD-10-CM

## 2018-08-06 LAB
ALT SERPL W P-5'-P-CCNC: 28 U/L (ref 0–50)
AST SERPL W P-5'-P-CCNC: 15 U/L (ref 0–45)
ERYTHROCYTE [DISTWIDTH] IN BLOOD BY AUTOMATED COUNT: 12.5 % (ref 10–15)
HCT VFR BLD AUTO: 42.4 % (ref 35–47)
HGB BLD-MCNC: 14.1 G/DL (ref 11.7–15.7)
MCH RBC QN AUTO: 30.4 PG (ref 26.5–33)
MCHC RBC AUTO-ENTMCNC: 33.3 G/DL (ref 31.5–36.5)
MCV RBC AUTO: 91 FL (ref 78–100)
PLATELET # BLD AUTO: 223 10E9/L (ref 150–450)
RBC # BLD AUTO: 4.64 10E12/L (ref 3.8–5.2)
TSH SERPL DL<=0.005 MIU/L-ACNC: 1.06 MU/L (ref 0.4–4)
WBC # BLD AUTO: 8.7 10E9/L (ref 4–11)

## 2018-08-06 ASSESSMENT — PAIN SCALES - GENERAL: PAINLEVEL: NO PAIN (0)

## 2018-08-06 NOTE — PROGRESS NOTES
[facility], [place]   Myasthenia gravis clinic note          Assessment and Plan:   Ms. 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  She has marked improvement of the treatment with plasmapheresis.  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  Later however is minor and has minimal symptomatic involvement.    Plan:  Prednisone 12.5 mg daily  CellCept 1000 mg twice a day.  EKG 12-lead.  Blood work including thyroid function, CBC, transaminases.  I have discussed that additional workup with help of her primary care provider and cardiologist may be considered.  Return to clinic in 6 months or sooner if necessary.    In all, have spent at least 30 minutes with more than half of overall time in counseling coordinate care.    Frederick Vitale MD  [phone]              Interval History:   Ms. Stallworth returned to the [facility] myasthenia gravis clinic on August 6, 2018.  Since her previous visit she reports that she feels better after the treatment with plasmapheresis which included 5 sessions.  She tolerated it well.  Now reports that she is able to work longer distances she is not as tired.  She does report improvement in her ocular symptoms.  As well as ability to perform activities of daily living, cooking at etc.   She was recently hospitalized for renal stone 10 mm which was removed.   Currently she expressed concern concern about tachycardia over the course of the last 2 weeks.  It is identified during her self measurements of blood pressure 100 heart rate goes up to 100 and high.  Normally she reports that her heart rate used to be between 70 and 80.  Same time, she  denies any  palpitations, racing heart, dizziness.  She does report that she is unsteady at times and feels she can fall.  She has no fallen, however.  She states that muscle twitching is high and apheresis has not changed this symptom.  Overall, she states that she has a great benefit after treatment with plasmapheresis with him and improvement in overall functionality.             Review of Systems:   The 10 point Review of Systems is negative other than noted in the HPI          Medications:     Current Outpatient Prescriptions Ordered in Epic   Medication     AMLODIPINE BESYLATE PO     amLODIPine-benazepril (LOTREL) 2.5-10 MG per capsule     Ascorbic Acid (VITAMIN C PO)     aspirin 81 MG tablet     bimatoprost (LUMIGAN) 0.01 % SOLN     calcium carbonate (OS-LINDEN 500 MG [place]. [place]) 500 MG tablet     carBAMazepine (CARBATROL) 200 MG 12 hr capsule     Cholecalciferol (VITAMIN D PO)     citalopram (CELEXA) 20 MG tablet not taking for 10 days.     clonazePAM (KLONOPIN) 1 MG tablet     cyclobenzaprine (FLEXERIL) 10 MG tablet     diphenhydrAMINE (BENADRYL) 50 MG capsule     fluorometholone (FML LIQUIFILM) 0.1 % ophthalmic susp     HEALON 10 MG/ML SOLN intra-ocular inj     IBUPROFEN PO     LANsoprazole (PREVACID) 30 MG capsule     methylPREDNISolone sodium succinate (SOLU-MEDROL) 125 mg/2 mL injection     order for DME     predniSONE (DELTASONE) 5 MG tablet     spironolactone (ALDACTONE) 100 MG tablet     Thiamine HCl (VITAMIN  B-1) 50 MG tablet     VITAMIN A PO     VITAMIN E     CELLCEPT (BRAND) 500 MG TABLET     Current Facility-Administered Medications Ordered in Epic   Medication Dose Route Frequency Last Rate Last Dose     lidocaine (PF) (XYLOCAINE) 1 % injection 300 mg  30 mL Subcutaneous Once                 Physical Exam:       BP: 119/82 Pulse: 98   Resp: 20 SpO2: 96 %     weight 71.6 down by 5 kg.  Constitutional:   awake, alert, cooperative, no apparent distress, and appears stated age     Neurologic:   Awake,  alert, oriented to name, place and time.  Cranial nerves II-XII are grossly intact.  Motor is 5 out of 5 bilaterally.  Cerebellar finger to nose, heel to shin intact.  Sensory is intact.  Babinski down going, Romberg negative, and gait is normal.        CC  Patient Care Team:  Ivan Marie MD as PCP - General (Internal Medicine)  Sean Torres MD as MD (Cardiology)  Jasmyn Terry, RN as Nurse Coordinator (Cardiology)  Charito Marinelli MD as MD (Internal Medicine)  IVAN MARIE A    Copy to patient  IVY PETERSON  [id number]  [place] [address]

## 2018-08-06 NOTE — LETTER
August 6, 2018       TO: Martina Kim  [id number]  [facility] [id number]       DearMs.Julio,    We are writing to inform you of your test results.    Your test results fall within the expected range(s) or remain unchanged from previous results.  Please continue with current treatment plan.    Resulted Orders   TSH with free T4 reflex   Result Value Ref Range    TSH 1.06 0.40 - 4.00 mU/L       Frederick Vitale MD

## 2018-08-06 NOTE — NURSING NOTE
Chief Complaint   Patient presents with     RECHECK     [facility] RETURN - MYASTHENIA GRAVIS     Ila King MA

## 2018-08-06 NOTE — LETTER
8/6/2018      RE: Martina Kim  [address] [facility] [address]       [facility], [place]   Myasthenia gravis clinic note          Assessment and Plan:   Ms. 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  She has marked improvement of the treatment with plasmapheresis.  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  Later however is minor and has minimal symptomatic involvement.    Plan:  Prednisone 12.5 mg daily  CellCept 1000 mg twice a day.  EKG 12-lead.  Blood work including thyroid function, CBC, transaminases.  I have discussed that additional workup with help of her primary care provider and cardiologist may be considered.  Return to clinic in 6 months or sooner if necessary.    In all, have spent at least 30 minutes with more than half of overall time in counseling coordinate care.    Frederick Vitale MD  [phone]              Interval History:   Ms. Stallworth returned to the [facility] myasthenia gravis clinic on August 6, 2018.  Since her previous visit she reports that she feels better after the treatment with plasmapheresis which included 5 sessions.  She tolerated it well.  Now reports that she is able to work longer distances she is not as tired.  She does report improvement in her ocular symptoms.  As well as ability to perform activities of daily living, cooking at etc.   She was recently hospitalized for renal stone 10 mm which was removed.   Currently she expressed concern concern about tachycardia over the course of the last 2 weeks.  It is identified during her self measurements of blood pressure 100 heart rate goes up to 100 and high.   Normally she reports that her heart rate used to be between 70 and 80.  Same time, she denies any  palpitations, racing heart, dizziness.  She does report that she is unsteady at times and feels she can fall.  She has no fallen, however.  She states that muscle twitching is high and apheresis has not changed this symptom.  Overall, she states that she has a great benefit after treatment with plasmapheresis with him and improvement in overall functionality.             Review of Systems:   The 10 point Review of Systems is negative other than noted in the HPI          Medications:     Current Outpatient Prescriptions Ordered in Epic   Medication     AMLODIPINE BESYLATE PO     amLODIPine-benazepril (LOTREL) 2.5-10 MG per capsule     Ascorbic Acid (VITAMIN C PO)     aspirin 81 MG tablet     bimatoprost (LUMIGAN) 0.01 % SOLN     calcium carbonate (OS-LINDEN 500 MG [place]. [place]) 500 MG tablet     carBAMazepine (CARBATROL) 200 MG 12 hr capsule     Cholecalciferol (VITAMIN D PO)     citalopram (CELEXA) 20 MG tablet not taking for 10 days.     clonazePAM (KLONOPIN) 1 MG tablet     cyclobenzaprine (FLEXERIL) 10 MG tablet     diphenhydrAMINE (BENADRYL) 50 MG capsule     fluorometholone (FML LIQUIFILM) 0.1 % ophthalmic susp     HEALON 10 MG/ML SOLN intra-ocular inj     IBUPROFEN PO     LANsoprazole (PREVACID) 30 MG capsule     methylPREDNISolone sodium succinate (SOLU-MEDROL) 125 mg/2 mL injection     order for DME     predniSONE (DELTASONE) 5 MG tablet     spironolactone (ALDACTONE) 100 MG tablet     Thiamine HCl (VITAMIN  B-1) 50 MG tablet     VITAMIN A PO     VITAMIN E     CELLCEPT (BRAND) 500 MG TABLET     Current Facility-Administered Medications Ordered in Epic   Medication Dose Route Frequency Last Rate Last Dose     lidocaine (PF) (XYLOCAINE) 1 % injection 300 mg  30 mL Subcutaneous Once                 Physical Exam:       BP: 119/82 Pulse: 98   Resp: 20 SpO2: 96 %     weight 71.6 down by 5 kg.  Constitutional:   awake,  alert, cooperative, no apparent distress, and appears stated age     Neurologic:   Awake, alert, oriented to name, place and time.  Cranial nerves II-XII are grossly intact.  Motor is 5 out of 5 bilaterally.  Cerebellar finger to nose, heel to shin intact.  Sensory is intact.  Babinski down going, Romberg negative, and gait is normal.        CC  Patient Care Team:  Ivan Marie MD as PCP - General (Internal Medicine)  Sean Torres MD as MD (Cardiology)  Jasmyn Terry RN as Nurse Coordinator (Cardiology)  Charito Marinelli MD as MD (Internal Medicine)  IVAN MARIE A    Copy to patient  IVY PETERSON  [id number]  [place] [address]        Frederick Vitale MD

## 2018-08-06 NOTE — LETTER
8/6/2018       RE: Martina Kim  [address] [facility] [address]     Dear Colleague,    Thank you for referring your patient, Martina Kim, to the [facility] NEUROLOGY at [facility]. Please see a copy of my visit note below.    [facility], [place]   Myasthenia gravis clinic note          Assessment and Plan:   Ms. 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  She has marked improvement of the treatment with plasmapheresis.  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  Later however is minor and has minimal symptomatic involvement.    Plan:  Prednisone 12.5 mg daily  CellCept 1000 mg twice a day.  EKG 12-lead.  Blood work including thyroid function, CBC, transaminases.  I have discussed that additional workup with help of her primary care provider and cardiologist may be considered.  Return to clinic in 6 months or sooner if necessary.    In all, have spent at least 30 minutes with more than half of overall time in counseling coordinate care.    Frederick Vitale MD  [phone]              Interval History:   Ms. Stallworth returned to the [facility] myasthenia gravis clinic on August 6, 2018.  Since her previous visit she reports that she feels better after the treatment with plasmapheresis which included 5 sessions.  She tolerated it well.  Now reports that she is able to work longer distances she is not as tired.  She does report improvement in her ocular symptoms.  As well as ability to perform activities of daily living, cooking at etc.   She was recently hospitalized for renal stone 10 mm which was removed.    Currently she expressed concern concern about tachycardia over the course of the last 2 weeks.  It is identified during her self measurements of blood pressure 100 heart rate goes up to 100 and high.  Normally she reports that her heart rate used to be between 70 and 80.  Same time, she denies any  palpitations, racing heart, dizziness.  She does report that she is unsteady at times and feels she can fall.  She has no fallen, however.  She states that muscle twitching is high and apheresis has not changed this symptom.  Overall, she states that she has a great benefit after treatment with plasmapheresis with him and improvement in overall functionality.             Review of Systems:   The 10 point Review of Systems is negative other than noted in the HPI          Medications:     Current Outpatient Prescriptions Ordered in Epic   Medication     AMLODIPINE BESYLATE PO     amLODIPine-benazepril (LOTREL) 2.5-10 MG per capsule     Ascorbic Acid (VITAMIN C PO)     aspirin 81 MG tablet     bimatoprost (LUMIGAN) 0.01 % SOLN     calcium carbonate (OS-LINDEN 500 MG [place]. [place]) 500 MG tablet     carBAMazepine (CARBATROL) 200 MG 12 hr capsule     Cholecalciferol (VITAMIN D PO)     citalopram (CELEXA) 20 MG tablet not taking for 10 days.     clonazePAM (KLONOPIN) 1 MG tablet     cyclobenzaprine (FLEXERIL) 10 MG tablet     diphenhydrAMINE (BENADRYL) 50 MG capsule     fluorometholone (FML LIQUIFILM) 0.1 % ophthalmic susp     HEALON 10 MG/ML SOLN intra-ocular inj     IBUPROFEN PO     LANsoprazole (PREVACID) 30 MG capsule     methylPREDNISolone sodium succinate (SOLU-MEDROL) 125 mg/2 mL injection     order for DME     predniSONE (DELTASONE) 5 MG tablet     spironolactone (ALDACTONE) 100 MG tablet     Thiamine HCl (VITAMIN  B-1) 50 MG tablet     VITAMIN A PO     VITAMIN E     CELLCEPT (BRAND) 500 MG TABLET     Current Facility-Administered Medications Ordered in Epic   Medication Dose Route Frequency Last Rate Last Dose     lidocaine  (PF) (XYLOCAINE) 1 % injection 300 mg  30 mL Subcutaneous Once                 Physical Exam:       BP: 119/82 Pulse: 98   Resp: 20 SpO2: 96 %     weight 71.6 down by 5 kg.  Constitutional:   awake, alert, cooperative, no apparent distress, and appears stated age     Neurologic:   Awake, alert, oriented to name, place and time.  Cranial nerves II-XII are grossly intact.  Motor is 5 out of 5 bilaterally.  Cerebellar finger to nose, heel to shin intact.  Sensory is intact.  Babinski down going, Romberg negative, and gait is normal.        CC  Patient Care Team:  Ivan Marie MD as PCP - General (Internal Medicine)  Sean Torres MD as MD (Cardiology)  Jasmyn Terry, RN as Nurse Coordinator (Cardiology)  Charito Marinelli MD as MD (Internal Medicine)      Copy to patient  IVY PETERSON  [id number]  [place] [address]

## 2018-08-06 NOTE — MR AVS SNAPSHOT
After Visit Summary   8/6/2018    Martina Kim    MRN: [id number]           Patient Information     Date Of Birth          1955        Visit Information        Provider Department      8/6/2018 2:30 PM Frederick Vitale MD [facility] Neurology        Today's Diagnoses     Myasthenia gravis without exacerbation (H)    -  1    Tachycardia           Follow-ups after your visit        Follow-up notes from your care team     Return in about 6 months (around 2/6/2019).      Your next 10 appointments already scheduled     Aug 06, 2018  4:30 PM CDT   LAB with  LAB   [facility] Lab ([facility])    [address] [place] [id number] [phone]           Please do not eat 10-12 hours before your appointment if you are coming in fasting for labs on lipids, cholesterol, or glucose (sugar). This does not apply to pregnant women. Water, hot tea and black coffee (with nothing added) are okay. Do not drink other fluids, diet soda or chew gum.            Jan 21, 2019 11:30 AM CST   (Arrive by 11:15 AM)   Return Myasthenia Gravis with Frederick Vitale MD   [facility] Neurology ([facility])    [address] [place] [id number] [phone]              Future tests that were ordered for you today     Open Future Orders        Priority Expected Expires Ordered    EKG 12-lead complete w/read - Clinics Routine  8/6/2019 8/6/2018    CBC with platelets Routine  8/6/2019 8/6/2018    ALT Routine  8/6/2019 8/6/2018    AST Routine  8/6/2019 8/6/2018            Who to contact     Please call your clinic at [phone] to:    Ask questions about your health    Make or cancel appointments    Discuss your medicines    Learn about your test results    Speak to your doctor            Additional Information About Your Visit        MyChart Information     [employer] gives you secure access to your electronic  "health record. If you see a primary care provider, you can also send messages to your care team and make appointments. If you have questions, please call your primary care clinic.  If you do not have a primary care provider, please call [phone] and they will assist you.      [employer] is an electronic gateway that provides easy, online access to your medical records. With [employer], you can request a clinic appointment, read your test results, renew a prescription or communicate with your care team.     To access your existing account, please contact your [facility] or call [phone] for assistance.        Care EveryWhere ID     This is your Care EveryWhere ID. This could be used by other organizations to access your [place] medical records  [id number]        Your Vitals Were     Pulse Respirations Height Pulse Oximetry BMI (Body Mass Index)       98 20 1.626 m (5' 4\") 96% 27.22 kg/m2        Blood Pressure from Last 3 Encounters:   08/06/18 119/82   07/23/18 132/84   07/18/18 120/62    Weight from Last 3 Encounters:   08/06/18 71.9 kg (158 lb 9.6 oz)   07/09/18 72.9 kg (160 lb 11.5 oz)   02/19/18 76.8 kg (169 lb 4.8 oz)              We Performed the Following     TSH with free T4 reflex          Today's Medication Changes          These changes are accurate as of 8/6/18  3:55 PM.  If you have any questions, ask your nurse or doctor.               These medicines have changed or have updated prescriptions.        Dose/Directions    predniSONE 5 MG tablet   Commonly known as:  DELTASONE   This may have changed:    - how much to take  - how to take this  - when to take this  - additional instructions   Used for:  Myasthenia gravis without exacerbation (H)        12.5mg daily   Quantity:  105 tablet   Refills:  3                Primary Care Provider Office Phone # Fax #    Ivan Marie [phone]       [address] " [address]        Equal Access to Services     [facility]: Hadii anand vyas jeana Storm, washashankda luqnuha, qaybta kadwight emileefermínosei, waxruthie tree carrascowarrenaraseli kohli. So [facility] [phone].    ATENCIÓN: Si habla español, tiene a mann disposición servicios gratuitos de asistencia lingüística. Llame al [phone].    We comply with applicable federal civil rights laws and [place] laws. We do not discriminate on the basis of race, color, national origin, age, disability, sex, sexual orientation, or gender identity.            Thank you!     Thank you for choosing [facility] NEUROLOGY  for your care. Our goal is always to provide you with excellent care. Hearing back from our patients is one way we can continue to improve our services. Please take a few minutes to complete the written survey that you may receive in the mail after your visit with us. Thank you!             Your Updated Medication List - Protect others around you: Learn how to safely use, store and throw away your medicines at www.disposemymeds.org.          This list is accurate as of 8/6/18  3:55 PM.  Always use your most recent med list.                   Brand Name Dispense Instructions for use Diagnosis    AMLODIPINE BESYLATE PO      Take 10 mg by mouth daily        amLODIPine-benazepril 2.5-10 MG per capsule    LOTREL    90 capsule    Take 1 capsule by mouth daily    Hypertension       aspirin 81 MG tablet      Take  by mouth daily. 1 Tablet Daily        calcium carbonate 500 MG tablet    OS-LINDEN 500 mg [place]. [place]     Take 500 mg by mouth 2 times daily    SOB (shortness of breath)       carBAMazepine 200 MG 12 hr capsule    CARBATROL    180 capsule    Take 1 capsule (200 mg) by mouth 2 times daily    Fasciculation       citalopram 20 MG tablet    celeXA    30 tablet    TAKE ONE TABLET BY MOUTH ONCE DAILY    Myasthenia gravis without exacerbation (H)       clonazePAM 1 MG tablet    klonoPIN    60 tablet    Take 1-2 tablets at bedtime as needed  for sleep    Myasthenia gravis without exacerbation (H)       cyclobenzaprine 10 MG tablet    FLEXERIL    30 tablet    TAKE ONE TABLET BY MOUTH AT BEDTIME AS NEEDED FOR MUSCLE SPASM    Fasciculation, Myasthenia gravis without exacerbation (H)       diphenhydrAMINE 50 MG capsule    BENADRYL     Take 50 mg by mouth every 6 hours as needed Every 4 hours during infusion        fluorometholone 0.1 % ophthalmic susp    FML LIQUIFILM     Apply 1 drop to eye 3 times daily        HEALON 10 MG/ML Soln intra-ocular inj   Generic drug:  hyaluronate      Apply 1 Application topically 4 times daily        IBUPROFEN PO      Take 800 mg by mouth every 8 hours as needed        LUMIGAN 0.01 % Soln   Generic drug:  bimatoprost      Place 1 drop into both eyes daily.        mycophenolate 500 MG tablet     120 tablet    Take 2 tablets (1,000 mg) by mouth 2 times daily    Myasthenia gravis without exacerbation (H)       order for [facility] Dream Station AutoBiPAP 11/6 cm, ComfortGel Blue Pte nasal mask.        predniSONE 5 MG tablet    DELTASONE    105 tablet    12.5mg daily    Myasthenia gravis without exacerbation (H)       PREVACID 30 MG CR capsule   Generic drug:  LANsoprazole      Take 30 mg by mouth daily 1 Tablet Daily        SOLU-MEDROL 125 mg/2 mL injection   Generic drug:  methylPREDNISolone sodium succinate      Inject 125 mg into the vein once        spironolactone 100 MG tablet    ALDACTONE    5 tablet    Take 1 tablet (100 mg) by mouth daily Before infusion    MG with exacerbation (myasthenia gravis) (H)       vitamin  B-1 50 MG tablet      Take 50 mg by mouth daily        VITAMIN A PO      Take 8,000 Units by mouth daily 1 Tablet Daily        VITAMIN C PO      Take 1,000 mg by mouth daily        VITAMIN D PO      Take 5,000 Units by mouth daily 1 Tablet Daily        VITAMIN E      400 Units daily 1 Tablet Daily

## 2018-09-25 DIAGNOSIS — G70.01 MYASTHENIA GRAVIS WITH EXACERBATION (H): Primary | ICD-10-CM

## 2018-09-28 ENCOUNTER — MYC MEDICAL ADVICE (OUTPATIENT)
Dept: OTHER | Age: 63
End: 2018-09-28

## 2018-10-03 ENCOUNTER — TELEPHONE (OUTPATIENT)
Dept: NEUROLOGY | Facility: CLINIC | Age: 63
End: 2018-10-03

## 2018-10-05 ENCOUNTER — MEDICAL CORRESPONDENCE (OUTPATIENT)
Dept: HEALTH INFORMATION MANAGEMENT | Facility: CLINIC | Age: 63
End: 2018-10-05

## 2018-10-09 ENCOUNTER — TELEPHONE (OUTPATIENT)
Dept: NEUROLOGY | Facility: CLINIC | Age: 63
End: 2018-10-09

## 2018-10-09 NOTE — TELEPHONE ENCOUNTER
Ling, pharmacist with [facility] calling for Estee again, she spoke with her earlier today.  She reports 10/4 they faxed orders, patient would like to infuse the 2 doses of IVIG that she has on hand before starting the Hizentra.  Order was faxed 10/4.  Please sign and fax back if OK.  Call Ling [phone] if we did not receive or if not alright to do so.

## 2018-10-09 NOTE — TELEPHONE ENCOUNTER
Health Call Center    Phone Message    May a detailed message be left on voicemail: yes    Reason for Call: Order(s): Other:   Reason for requested: Need additional orders.  Yonatan has Hizentra brand immunoglobulin orders.  They however have not received orders they requested for caregivers and infusion supplies.  If we have them, please sign, date, and fax back as directed on the form.  Or call [phone] option #2 & #2 again.  Will get to pharmacist and can give telephone orders.  Lorenzo will fax again to [phone] incase we did not receive.  Date needed: ASAP  Provider name: Dr. Vitale      Action Taken: Message routed to:  [facility] Neurology

## 2018-10-10 NOTE — TELEPHONE ENCOUNTER
Per Dr. Vitale: Yes, I'm fine for her to have 2 more doses of IVIG.    Called [facility] and spoke with Jerrell Cortez. I let her know Dr. Vitale is OK with Martina receiving 2 more doses of IVIG prior to transitioning to SCIG. Confirmed IVIg orders with her.

## 2018-10-17 NOTE — TELEPHONE ENCOUNTER
[facility] Call Center    Phone Message    May a detailed message be left on voicemail: yes    Reason for Call: Other: Per call from [facility] got verbal order to administer the drug, but the order needs to be signed for nursing order. Lima can be reached at the above # with any questions.      Action Taken: Message routed to:  [facility] Neurology

## 2018-10-17 NOTE — TELEPHONE ENCOUNTER
Spoke with Ling at [facility]. She will fax over the nursing orders that need to be signed by Dr. Vitale.

## 2018-10-18 ENCOUNTER — TELEPHONE (OUTPATIENT)
Dept: NEUROLOGY | Facility: CLINIC | Age: 63
End: 2018-10-18

## 2018-10-18 NOTE — TELEPHONE ENCOUNTER
[facility] Call Center    Phone Message    May a detailed message be left on voicemail: yes    Reason for Call: Other: Pt is looking for the phone number for the  that works with Dr. Vitale.     Action Taken: Message routed to:  [facility] Neurology

## 2018-10-18 NOTE — TELEPHONE ENCOUNTER
Called and spoke with Martina. She is requesting to speak with a SW to discuss if Medicare would cover PLEX. Her current BCBS insurance is ending 1/31/19 and she needs to choose a supplement now. We discussed this and per the Medicare website, they will cover PLEX for acquired MG. Patient no longer needs to speak with SW.

## 2018-10-23 ENCOUNTER — TRANSFERRED RECORDS (OUTPATIENT)
Dept: HEALTH INFORMATION MANAGEMENT | Facility: CLINIC | Age: 63
End: 2018-10-23

## 2018-11-12 DIAGNOSIS — G70.00 MYASTHENIA GRAVIS WITHOUT EXACERBATION (H): ICD-10-CM

## 2018-11-13 RX ORDER — PREDNISONE 5 MG/1
12.5 TABLET ORAL DAILY
Qty: 75 TABLET | Refills: 2 | Status: SHIPPED | OUTPATIENT
Start: 2018-11-13 | End: 2019-01-08

## 2018-12-03 DIAGNOSIS — G70.00 MYASTHENIA GRAVIS WITHOUT EXACERBATION (H): ICD-10-CM

## 2018-12-03 RX ORDER — CITALOPRAM HYDROBROMIDE 20 MG/1
TABLET ORAL
Qty: 30 TABLET | Refills: 3 | Status: SHIPPED | OUTPATIENT
Start: 2018-12-03 | End: 2019-05-13

## 2019-01-08 DIAGNOSIS — R25.3 FASCICULATION: ICD-10-CM

## 2019-01-08 DIAGNOSIS — G70.00 MYASTHENIA GRAVIS WITHOUT EXACERBATION (H): ICD-10-CM

## 2019-01-08 RX ORDER — PREDNISONE 5 MG/1
TABLET ORAL
Qty: 75 TABLET | Refills: 2 | Status: CANCELLED | OUTPATIENT
Start: 2019-01-08

## 2019-01-08 RX ORDER — CYCLOBENZAPRINE HCL 10 MG
TABLET ORAL
Qty: 30 TABLET | Refills: 1 | Status: CANCELLED | OUTPATIENT
Start: 2019-01-08

## 2019-01-08 NOTE — TELEPHONE ENCOUNTER
Prior Authorization Not Needed per Insurance    Medication: predniSONE (DELTASONE) 5 MG tablet-PA Not Needed  Insurance Company: [employer] - Phone [phone] Fax [phone]  Expected CoPay:      Pharmacy Filling the Rx: [facility] [address] [place] [address]  Pharmacy Notified: Yes  Patient Notified: No

## 2019-01-08 NOTE — TELEPHONE ENCOUNTER
Prior Authorization Retail Medication Request    Medication/Dose: clonazepam 1 mg with instruction to take 1-2 tabs at bedtime PRN (please submit for 90-day supply)  ICD code (if different than what is on RX):  Myasthenia Gravis without exacerbation G70.00  Previously Tried and Failed:    Rationale:  Continuation of therapy for disease and sleep aide    Insurance Name:  na  Insurance ID:  na      Pharmacy Information (if different than what is on RX)  Name:  [facility]  Phone:  [phone]

## 2019-01-08 NOTE — TELEPHONE ENCOUNTER
Prior Authorization Retail Medication Request    Medication/Dose: prednisone 12.5 mg daily (please submit for 90-day supply)  ICD code (if different than what is on RX):  Myasthenia Gravis without exacerbation G70.00  Previously Tried and Failed:    Rationale:  Continuation of therapy to treat disease.     Insurance Name:  na  Insurance ID:  na      Pharmacy Information (if different than what is on RX)  Name:  [facility]   Phone:  [phone]

## 2019-01-08 NOTE — TELEPHONE ENCOUNTER
Prior Authorization Approval    Authorization Effective Date: 12/9/2018  Authorization Expiration Date: 1/8/2020  Medication: cyclobenzaprine (FLEXERIL) 10 MG tablet-APPROVED  Approved Dose/Quantity:   Reference #: [id number]   Insurance Company: [employer] - Phone [phone] Fax [phone]  Expected CoPay:       CoPay Card Available:      Foundation Assistance Needed:    Which Pharmacy is filling the prescription (Not needed for infusion/clinic administered): [facility] [address] [place] [address]  Pharmacy Notified: Yes  Patient Notified: No-Pharmacy will notify patient when ready.

## 2019-01-08 NOTE — TELEPHONE ENCOUNTER
Prior Authorization Not Needed per Insurance    Medication: clonazePAM (KLONOPIN) 1 MG tablet-PA Not Needed  Insurance Company: [employer] - Phone [phone] Fax [phone]  Expected CoPay:      Pharmacy Filling the Rx: [facility] [address] [place] [address]  Pharmacy Notified: Yes  Patient Notified: No

## 2019-01-09 RX ORDER — CLONAZEPAM 1 MG/1
TABLET ORAL
Qty: 180 TABLET | Refills: 0 | Status: SHIPPED | OUTPATIENT
Start: 2019-01-09 | End: 2019-12-09

## 2019-01-09 RX ORDER — PREDNISONE 5 MG/1
12.5 TABLET ORAL DAILY
Qty: 225 TABLET | Refills: 0 | Status: SHIPPED | OUTPATIENT
Start: 2019-01-09 | End: 2019-05-13

## 2019-01-09 RX ORDER — CYCLOBENZAPRINE HCL 10 MG
TABLET ORAL
Qty: 180 TABLET | Refills: 0 | Status: SHIPPED | OUTPATIENT
Start: 2019-01-09 | End: 2020-01-27

## 2019-01-16 ENCOUNTER — PATIENT OUTREACH (OUTPATIENT)
Dept: CARE COORDINATION | Facility: CLINIC | Age: 64
End: 2019-01-16

## 2019-01-21 ENCOUNTER — OFFICE VISIT (OUTPATIENT)
Dept: NEUROLOGY | Facility: CLINIC | Age: 64
End: 2019-01-21
Payer: COMMERCIAL

## 2019-01-21 VITALS
SYSTOLIC BLOOD PRESSURE: 150 MMHG | DIASTOLIC BLOOD PRESSURE: 84 MMHG | OXYGEN SATURATION: 95 % | HEIGHT: 64 IN | BODY MASS INDEX: 29.02 KG/M2 | HEART RATE: 71 BPM | WEIGHT: 170 LBS

## 2019-01-21 DIAGNOSIS — M54.16 LUMBAR RADICULOPATHY: Primary | ICD-10-CM

## 2019-01-21 DIAGNOSIS — G70.00 MYASTHENIA GRAVIS WITHOUT EXACERBATION (H): ICD-10-CM

## 2019-01-21 ASSESSMENT — ENCOUNTER SYMPTOMS
SNORES LOUDLY: 0
SWOLLEN GLANDS: 0
TINGLING: 1
BRUISES/BLEEDS EASILY: 1
DYSURIA: 0
RECTAL PAIN: 0
TREMORS: 1
COUGH DISTURBING SLEEP: 0
BOWEL INCONTINENCE: 0
WEAKNESS: 1
LOSS OF CONSCIOUSNESS: 0
HEADACHES: 1
INSOMNIA: 1
NERVOUS/ANXIOUS: 1
DECREASED CONCENTRATION: 1
TASTE DISTURBANCE: 0
MEMORY LOSS: 1
DOUBLE VISION: 1
MYALGIAS: 1
VOMITING: 0
NAUSEA: 0
CONSTIPATION: 0
HEMOPTYSIS: 0
BACK PAIN: 1
PARALYSIS: 0
EYE IRRITATION: 1
EYE WATERING: 0
HOARSE VOICE: 1
SMELL DISTURBANCE: 0
MUSCLE CRAMPS: 1
ARTHRALGIAS: 1
FLANK PAIN: 0
DISTURBANCES IN COORDINATION: 1
PANIC: 0
SHORTNESS OF BREATH: 1
EYE PAIN: 0
BLOATING: 0
JAUNDICE: 0
NECK PAIN: 1
SORE THROAT: 0
DYSPNEA ON EXERTION: 1
HEMATURIA: 0
DIARRHEA: 0
DIZZINESS: 1
COUGH: 0
SINUS CONGESTION: 0
ABDOMINAL PAIN: 0
SPEECH CHANGE: 1
EYE REDNESS: 1
DIFFICULTY URINATING: 0
WHEEZING: 0
NECK MASS: 0
POSTURAL DYSPNEA: 1
SEIZURES: 0
MUSCLE WEAKNESS: 1
BLOOD IN STOOL: 0
STIFFNESS: 1
TROUBLE SWALLOWING: 1
JOINT SWELLING: 0
SPUTUM PRODUCTION: 0
NUMBNESS: 1
DEPRESSION: 1
SINUS PAIN: 0
HEARTBURN: 0

## 2019-01-21 ASSESSMENT — MIFFLIN-ST. JEOR: SCORE: 1301.11

## 2019-01-21 ASSESSMENT — PAIN SCALES - GENERAL: PAINLEVEL: MILD PAIN (3)

## 2019-01-21 NOTE — LETTER
1/21/2019       RE: Martina Kim  [id number] [facility] [address]     Dear Colleague,    Thank you for referring your patient, Martina Kim, to the [facility] NEUROLOGY at [facility]. Please see a copy of my visit note below.    [facility], [place]   Myasthenia gravis clinic note              Assessment and Plan:    Ms. 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 She reports worsening of her symptoms since being off of SCIg since November 2018. 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  Later however is minor and has minimal symptomatic involvement.     She also complains of a several month history of right anterolateral proximal leg numbness and paresthesias. Given a previous lumbar MRI in 2015 that revealed multilevel degenerative changes, we will rule out radiculopathy as a possible cause of these symptoms.     Plan:  - Check MRI lumbar spine w/o contrast  - EMG/NCS of the right leg (including LCFN)  - Continue Prednisone 12.5 mg daily  - Will attempt to get insurance approval for SCIg  -  if unsuccessful and symptoms become worse, will initiate IVIg treatment.   - Return to clinic in 4 months or sooner if necessary.    Anneliese Sheppard DO   [facility] Neuromuscular Fellow [id number]    I personally examined this patient, reviewed vital signs and pertinent auxiliary test results.  This note details our findings, impression and plan that we formulated together.    I spent total of 30 minutes face-to-face with Martina Kim during today's visit. Over 50% of this time was spent counseling the patient and coordinating care. See note for details.                     "Interval History:    Ms. Stallworth returned to the [facility] myasthenia gravis clinic on January 21, 2019. She was last seen in clinic August 6, 2018.  She was having weekly infusion of SCIg up until the end of November. She is not on Mestinon. Since her previous visit, she started to notice right ptosis that she has not previously had. The left ptosis is stable. She does report her head falling back at times, worsening right > left leg weakness. She reports stiffness and spasms in the right leg and buttocks that seems to be worse with fatigue and can occur at rest. The spasms occur about every 6 weeks; stiffness is constant. She reports numbness and burning sensation in the right anterolateral proximal leg now for several months.      She has been having shortness of breath and has to be careful when eating to avoid choking. She has sweating episodes at night. She does not sleep well when she does not use her CPAP.   She has not had double vision or sialorrhea.     See \"snapshot\" for prior treatment trials.     MG symptoms (bold reflects current status)    Symptoms     Comment   General  Normal Good Fair Poor    Diplopia  None Rare Frequent Constant    Ptosis  None Rare Frequent Constant    UE   weakness None Slightly limited Some ADL difficulties ADL limited Hard to blow dry hair   LE   weakness None Run/walk fatigue Short distance Minimal walking    Dysarthria  None Mild Moderate Severe    Voice  Normal Fade Soft Very quiet    Chew fatigue None Fatigue Fatigue soft food Tube feeding    Dysphagia  None Normal food Soft food Tube feeding flactuate between soft and normal   Dyspnea None Normal effort Any effort At rest/ Resp support Walking, going up the stairs                    Review of Systems:   The 10 point Review of Systems is negative other than noted in the HPI          Medications:          Current Outpatient Prescriptions Ordered in [facility]     Current Outpatient Medications   Medication     " "amLODIPine-benazepril (LOTREL) 2.5-10 MG per capsule     Ascorbic Acid (VITAMIN C PO)     aspirin 81 MG tablet     bimatoprost (LUMIGAN) 0.01 % SOLN     calcium carbonate (OS-LINDEN 500 MG [place]. [place]) 500 MG tablet     Cholecalciferol (VITAMIN D PO)     citalopram (CELEXA) 20 MG tablet     clonazePAM (KLONOPIN) 1 MG tablet     cyclobenzaprine (FLEXERIL) 10 MG tablet     diphenhydrAMINE (BENADRYL) 50 MG capsule     IBUPROFEN PO     LANsoprazole (PREVACID) 30 MG capsule     methylPREDNISolone sodium succinate (SOLU-MEDROL) 125 mg/2 mL injection     order for DME     predniSONE (DELTASONE) 5 MG tablet     spironolactone (ALDACTONE) 100 MG tablet     Thiamine HCl (VITAMIN  B-1) 50 MG tablet     VITAMIN A PO     VITAMIN E     AMLODIPINE BESYLATE PO     carBAMazepine (CARBATROL) 200 MG 12 hr capsule     CELLCEPT (BRAND) 500 MG TABLET     fluorometholone (FML LIQUIFILM) 0.1 % ophthalmic susp     HEALON 10 MG/ML SOLN intra-ocular inj     Immune Globulin, Human, 10 GM/50ML SOLN     No current facility-administered medications for this visit.      Facility-Administered Medications Ordered in Other Visits   Medication     lidocaine (PF) (XYLOCAINE) 1 % injection 300 mg                Current Facility-Administered Medications Ordered in Epic   Medication Dose Route Frequency Last Rate Last Dose     lidocaine (PF) (XYLOCAINE) 1 % injection 300 mg  30 mL Subcutaneous Once                  Physical Exam:       /84 (BP Location: Left arm)   Pulse 71   Ht 1.626 m (5' 4\")   Wt 77.1 kg (170 lb)   SpO2 95%   BMI 29.18 kg/m       Constitutional:    awake, alert, cooperative, no apparent distress, and appears stated age      Neurologic:    Awake, alert, oriented to name, place and time.  Cranial nerves II-XII are grossly intact.    Give way weakness throughout- no discernable weakness bilaterally proximally and distally in the lower and upper extremities. Normal neck flexion and extension   Cerebellar finger to nose, heel to shin " intact.  Sensory is intact.  Babinski down going, Romberg negative, and gait is normal.       Again, thank you for allowing me to participate in the care of your patient.      Sincerely,    Frederick Vitale MD      Patient Care Team:  Ivan Marie MD as PCP - General (Internal Medicine)  Sean Torres MD as MD (Cardiology)  Jasmyn Terry, RN as Nurse Coordinator (Cardiology)  Charito Marinelli MD as MD (Internal Medicine)  IVAN MARIE A     Copy to patient  IVY PETERSON  [address]  [place] [address]

## 2019-01-21 NOTE — PROGRESS NOTES
[facility], [place]   Myasthenia gravis clinic note              Assessment and Plan:    Ms. 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 She reports worsening of her symptoms since being off of SCIg since November 2018. 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  Later however is minor and has minimal symptomatic involvement.     She also complains of a several month history of right anterolateral proximal leg numbness and paresthesias. Given a previous lumbar MRI in 2015 that revealed multilevel degenerative changes, we will rule out radiculopathy as a possible cause of these symptoms.     Plan:  - Check MRI lumbar spine w/o contrast  - EMG/NCS of the right leg (including LCFN)  - Continue Prednisone 12.5 mg daily  - Will attempt to get insurance approval for SCIg  -  if unsuccessful and symptoms become worse, will initiate IVIg treatment.   - Return to clinic in 4 months or sooner if necessary.    Anneliese Sheppard DO   [facility] Neuromuscular Fellow [phone]    I personally examined this patient, reviewed vital signs and pertinent auxiliary test results.  This note details our findings, impression and plan that we formulated together.    I spent total of 30 minutes face-to-face with Martina Kim during today's visit. Over 50% of this time was spent counseling the patient and coordinating care. See note for details.    Sincerely yours,      Frederick Vitale MD  Pediatric Neurology  [phone]                    Interval History:    Ms. Stallworth returned to the [facility] myasthenia gravis clinic on January 21, 2019. She was last seen in clinic August 6, 2018. She was having weekly infusion of SCIg up  "until the end of November. She is not on Mestinon. Since her previous visit, she started to notice right ptosis that she has not previously had. The left ptosis is stable. She does report her head falling back at times, worsening right > left leg weakness. She reports stiffness and spasms in the right leg and buttocks that seems to be worse with fatigue and can occur at rest. The spasms occur about every 6 weeks; stiffness is constant. She reports numbness and burning sensation in the right anterolateral proximal leg now for several months.      She has been having shortness of breath and has to be careful when eating to avoid choking. She has sweating episodes at night. She does not sleep well when she does not use her CPAP.   She has not had double vision or sialorrhea.     See \"snapshot\" for prior treatment trials.     MG symptoms (bold reflects current status)    Symptoms     Comment   General  Normal Good Fair Poor    Diplopia  None Rare Frequent Constant    Ptosis  None Rare Frequent Constant    UE   weakness None Slightly limited Some ADL difficulties ADL limited Hard to blow dry hair   LE   weakness None Run/walk fatigue Short distance Minimal walking    Dysarthria  None Mild Moderate Severe    Voice  Normal Fade Soft Very quiet    Chew fatigue None Fatigue Fatigue soft food Tube feeding    Dysphagia  None Normal food Soft food Tube feeding flactuate between soft and normal   Dyspnea None Normal effort Any effort At rest/ Resp support Walking, going up the stairs                    Review of Systems:   The 10 point Review of Systems is negative other than noted in the HPI          Medications:          Current Outpatient Prescriptions Ordered in [facility]     Current Outpatient Medications   Medication     amLODIPine-benazepril (LOTREL) 2.5-10 MG per capsule     Ascorbic Acid (VITAMIN C PO)     aspirin 81 MG tablet     bimatoprost (LUMIGAN) 0.01 % SOLN     calcium carbonate (OS-LINDEN 500 MG [place]. [place]) 500 MG tablet " "    Cholecalciferol (VITAMIN D PO)     citalopram (CELEXA) 20 MG tablet     clonazePAM (KLONOPIN) 1 MG tablet     cyclobenzaprine (FLEXERIL) 10 MG tablet     diphenhydrAMINE (BENADRYL) 50 MG capsule     IBUPROFEN PO     LANsoprazole (PREVACID) 30 MG capsule     methylPREDNISolone sodium succinate (SOLU-MEDROL) 125 mg/2 mL injection     order for DME     predniSONE (DELTASONE) 5 MG tablet     spironolactone (ALDACTONE) 100 MG tablet     Thiamine HCl (VITAMIN  B-1) 50 MG tablet     VITAMIN A PO     VITAMIN E     AMLODIPINE BESYLATE PO     carBAMazepine (CARBATROL) 200 MG 12 hr capsule     CELLCEPT (BRAND) 500 MG TABLET     fluorometholone (FML LIQUIFILM) 0.1 % ophthalmic susp     HEALON 10 MG/ML SOLN intra-ocular inj     Immune Globulin, Human, 10 GM/50ML SOLN     No current facility-administered medications for this visit.      Facility-Administered Medications Ordered in Other Visits   Medication     lidocaine (PF) (XYLOCAINE) 1 % injection 300 mg                Current Facility-Administered Medications Ordered in Epic   Medication Dose Route Frequency Last Rate Last Dose     lidocaine (PF) (XYLOCAINE) 1 % injection 300 mg  30 mL Subcutaneous Once                  Physical Exam:       /84 (BP Location: Left arm)   Pulse 71   Ht 1.626 m (5' 4\")   Wt 77.1 kg (170 lb)   SpO2 95%   BMI 29.18 kg/m      Constitutional:    awake, alert, cooperative, no apparent distress, and appears stated age      Neurologic:    Awake, alert, oriented to name, place and time.  Cranial nerves II-XII are grossly intact.    Give way weakness throughout- no discernable weakness bilaterally proximally and distally in the lower and upper extremities. Normal neck flexion and extension   Cerebellar finger to nose, heel to shin intact.  Sensory is intact.  Babinski down going, Romberg negative, and gait is normal.         CC  Patient Care Team:  Ivan Marie MD as PCP - General (Internal Medicine)  Sean Torres MD as MD " (Cardiology)  Jasmyn Terry, RN as Nurse Coordinator (Cardiology)  Charito Marinelli MD as MD (Internal Medicine)  ASHWIN SUAREZ A     Copy to patient  IVY PETERSON  [address] [facility] [address]

## 2019-01-22 ENCOUNTER — TELEPHONE (OUTPATIENT)
Dept: NEUROLOGY | Facility: CLINIC | Age: 64
End: 2019-01-22

## 2019-01-22 NOTE — TELEPHONE ENCOUNTER
Patient was seen in clinic yesterday and the decision was made to try to get SCIg approved again. Order written by Dr. Vitale. This, along with demographics and clinic note faxed to [facility] (phone: [phone]; Fax:  [phone]). Asked them to conduct insurance investigation and let us know the outcome. Will monitor.

## 2019-02-04 NOTE — TELEPHONE ENCOUNTER
Called [facility] and spoke to Jerrell Rosales (phone: [phone]) to follow up on request for SCIg. They tell me they have been waiting for the patient to contact them to provide updated health insurance. I contacted the patient and advised her to contact [facility]. [facility] will need to re-submit PA for SCIg and if denied, notify us so Dr. Vitale can appeal.

## 2019-04-03 ENCOUNTER — TELEPHONE (OUTPATIENT)
Dept: NEUROLOGY | Facility: CLINIC | Age: 64
End: 2019-04-03

## 2019-04-03 NOTE — TELEPHONE ENCOUNTER
Per [facility] rep, PA is needed for [employer]. Rec'd fas form instructing me to call [phone] to initiate a PA request (even though this was previously). Called the number and spoke with Fara, provided her with SCIG orders of 70 mls (14g) injected subcutaneously weekly and provider details.     Case #: [id number]    We can expect a fax determination by Friday.

## 2019-04-08 NOTE — TELEPHONE ENCOUNTER
Notified by [facility] that PA for SCIG is approved from 03/04/19-04/03/20. Called [facility] and they are aware of this and are working to complete financial assistance ppwk for patient.     Called patient and she tells me that she would have to pay $1000 OOP weekly for this product, which she cannot afford. She is continuing to look into her options for SCIG or IVIG. She will send me a correspondence when she is ready to move forward.

## 2019-04-12 ENCOUNTER — MYC MEDICAL ADVICE (OUTPATIENT)
Dept: NEUROLOGY | Facility: CLINIC | Age: 64
End: 2019-04-12

## 2019-04-15 NOTE — TELEPHONE ENCOUNTER
Dr. Vitale, please see Martina's MyChart message below. I assume you are OK with proceeding with getting her back on IVIg. I will send orders if so. Therapy plan is still good.

## 2019-04-16 DIAGNOSIS — G70.01 MYASTHENIA GRAVIS WITH EXACERBATION (H): Primary | ICD-10-CM

## 2019-04-16 RX ORDER — ACETAMINOPHEN 325 MG/1
650 TABLET ORAL ONCE
Status: CANCELLED
Start: 2019-04-16

## 2019-04-16 RX ORDER — DIPHENHYDRAMINE HCL 25 MG
50 CAPSULE ORAL ONCE
Status: CANCELLED | OUTPATIENT
Start: 2019-04-16

## 2019-04-16 NOTE — TELEPHONE ENCOUNTER
Orders signed by Dr. Vitale. These have been faxed to [facility] (fax: [phone]).   Patient made aware of this.

## 2019-04-17 NOTE — TELEPHONE ENCOUNTER
Health Call Center    Phone Message    May a detailed message be left on voicemail: yes    Reason for Call: Other: Christiane calling from [facility] to speak with Estee in regards to the IVIG orders that were faxed to them. Christiane states that the order doesn't specify how many, it just says weekly. Christiane states that a clarification of how many doses can be faxed to her at [phone]. Please give Christiane a call with any questions.     Action Taken: Message routed to:  [facility] ([facility] Neurology

## 2019-04-17 NOTE — TELEPHONE ENCOUNTER
Dr. Vitale would like to do IVIG infusions for 3 months. Therapy plan updated and routed to Dr. Vitale for review and signature.

## 2019-04-18 NOTE — TELEPHONE ENCOUNTER
Therapy plan signed by Dr. Vitale. This has been faxed to [facility] at the number below.

## 2019-05-13 DIAGNOSIS — G70.00 MYASTHENIA GRAVIS WITHOUT EXACERBATION (H): ICD-10-CM

## 2019-05-13 RX ORDER — CITALOPRAM HYDROBROMIDE 20 MG/1
TABLET ORAL
Qty: 30 TABLET | Refills: 3 | Status: SHIPPED | OUTPATIENT
Start: 2019-05-13 | End: 2019-09-09

## 2019-05-13 RX ORDER — PREDNISONE 5 MG/1
TABLET ORAL
Qty: 225 TABLET | Refills: 0 | Status: SHIPPED | OUTPATIENT
Start: 2019-05-13 | End: 2019-09-09

## 2019-06-04 DIAGNOSIS — G70.01 MG WITH EXACERBATION (MYASTHENIA GRAVIS) (H): ICD-10-CM

## 2019-06-04 RX ORDER — SPIRONOLACTONE 100 MG/1
TABLET, FILM COATED ORAL
Qty: 5 TABLET | Refills: 2 | Status: SHIPPED | OUTPATIENT
Start: 2019-06-04 | End: 2019-12-17

## 2019-07-15 ENCOUNTER — OFFICE VISIT (OUTPATIENT)
Dept: NEUROLOGY | Facility: CLINIC | Age: 64
End: 2019-07-15
Payer: MEDICARE

## 2019-07-15 VITALS
DIASTOLIC BLOOD PRESSURE: 87 MMHG | BODY MASS INDEX: 29.35 KG/M2 | WEIGHT: 171 LBS | SYSTOLIC BLOOD PRESSURE: 138 MMHG | OXYGEN SATURATION: 95 % | HEART RATE: 83 BPM

## 2019-07-15 DIAGNOSIS — G70.00 MYASTHENIA GRAVIS WITHOUT EXACERBATION (H): Primary | ICD-10-CM

## 2019-07-15 RX ORDER — LISINOPRIL 20 MG/1
20 TABLET ORAL
COMMUNITY
Start: 2019-06-28

## 2019-07-15 RX ORDER — MYCOPHENOLATE MOFETIL 500 MG/1
1000 TABLET ORAL 2 TIMES DAILY
Qty: 120 TABLET | Refills: 5 | Status: SHIPPED | OUTPATIENT
Start: 2019-07-15 | End: 2019-11-18

## 2019-07-15 ASSESSMENT — PAIN SCALES - GENERAL: PAINLEVEL: NO PAIN (0)

## 2019-07-15 NOTE — LETTER
7/15/2019       RE: Martina Kim  [address] [place] [address]     Dear Colleague,    Thank you for referring your patient, Martina Kim, to the [facility] NEUROLOGY at [facility]. Please see a copy of my visit note below.                 [facility]                  Muscular Dystrophy Clinic Note     Martina Kim MRN# [id number]   YOB: 1955 Age: 63 year old      Date of Visit: Jul 15, 2019    Primary care provider: Ivan Marie A      History is obtained from the patient, family and medical record       Interval Change:      Martina Kim is a 63 year old female was seen and examined at the muscular dystrophy clinic on Jul 15, 2019 for evaluation of myasthenia gravis. She continues to be symptomatic but stable.  She continues to have genralized fatigue and continues to have fatigable weakness especially in her neck. She has difficulty ambulating for long distances, taking stairs, using her upper extremities. She continue IVIG and has 4 infusions left. Ms. Kim was using Hizentra. Her Prednisone dose is down to 10 mg daily. She developed glaucoma which improved since she dropped Prednisone dose.  She continues to have muscle twitching and is on carbamazepine but benefit is unclear.           Allergies:      Allergies   Allergen Reactions     Bee Venom Anaphylaxis     Contrast Dye Rash     Other reaction(s): Intolerance-Can't Take  Worsens MG symptoms  Worsens MG symptoms  Patient has myasthenia gravis from xray dye given for ct angio head     Fentanyl And Related Nausea and Vomiting     Intractable vomiting     Wasp Venom Protein Anaphylaxis     Codeine Nausea and Vomiting     Fosamax [Alendronic Acid]      Other reaction(s): GI Upset  GERD     Morphine Nausea and Vomiting     Morphine Hcl Nausea and Vomiting     Pt states she is allergic to ALL Narcotics.     Oxycodone Nausea and Vomiting      Percocet [Oxycodone-Acetaminophen] Nausea and Vomiting     Phenytoin Nausea and Vomiting     Other reaction(s): GI Upset             Medications:     Prescription Medications as of 7/15/2019       Rx Number Disp Refills Start End Last Dispensed Date Next Fill Date Owning Pharmacy    AMLODIPINE BESYLATE PO            Sig: Take 10 mg by mouth daily    Class: Historical    Route: Oral    amLODIPine-benazepril (LOTREL) 2.5-10 MG per capsule  90 capsule 3 9/20/2013    [facility] [address] [place], [place] [address], [address] [place] AT [facility]    Sig: Take 1 capsule by mouth daily    Class: E-Prescribe    Route: Oral    Ascorbic Acid (VITAMIN C PO)            Sig: Take 1,000 mg by mouth daily    Class: Historical    Route: Oral    aspirin 81 MG tablet            Sig: Take  by mouth daily. 1 Tablet Daily    Class: Historical    Route: Oral    bimatoprost (LUMIGAN) 0.01 % SOLN            Sig: Place 1 drop into both eyes daily.     Class: Historical    Route: Both Eyes    calcium carbonate (OS-LINDEN 500 MG [place]. [place]) 500 MG tablet            Sig: Take 500 mg by mouth 2 times daily    Class: Historical    Route: Oral    Cholecalciferol (VITAMIN D PO)            Sig: Take 5,000 Units by mouth daily 1 Tablet Daily    Class: Historical    Route: Oral    citalopram (CELEXA) 20 MG tablet  30 tablet 3 5/13/2019    [facility] [address] [place], [place] [id number]    Sig: TAKE ONE TABLET BY MOUTH ONCE DAILY    Class: E-Prescribe    clonazePAM (KLONOPIN) 1 MG tablet  180 tablet 0 1/9/2019    [facility] [address] [place], [place] [id number]    Sig: Take 1-2 tablets at bedtime as needed for sleep    Class: Local Print    cyclobenzaprine (FLEXERIL) 10 MG tablet  180 tablet 0 1/9/2019    [facility] [address] [place], [place] [id number] [address]    Sig: TAKE ONE to TWO TABLETS BY MOUTH AT BEDTIME AS NEEDED FOR MUSCLE SPASM    Class: E-Prescribe    diphenhydrAMINE (BENADRYL) 50 MG capsule             Sig: Take 50 mg by mouth every 6 hours as needed Every 4 hours during infusion    Class: Historical    Route: Oral    fluorometholone (FML LIQUIFILM) 0.1 % ophthalmic susp   1 10/4/2017        Sig: Apply 1 drop to eye 3 times daily    Class: Historical    Route: Ophthalmic    IBUPROFEN PO            Sig: Take 800 mg by mouth every 8 hours as needed     Class: Historical    Route: Oral    Immune Globulin, Human, 10 GM/50ML SOLN  280 mL 3 2018    [facility] [address] [place] [address] [address]    Sig: Inject 70 mLs (14 g) Subcutaneous once a week    Class: E-Prescribe    Route: Subcutaneous    LANsoprazole (PREVACID) 30 MG capsule            Sig: Take 30 mg by mouth daily 1 Tablet Daily    Class: Historical    Route: Oral    lisinopril (PRINIVIL/ZESTRIL) 20 MG tablet    2019        Sig: Take 20 mg by mouth    Class: Historical    Route: Oral    methylPREDNISolone sodium succinate (SOLU-MEDROL) 125 mg/2 mL injection            Sig: Inject 125 mg into the vein once    Class: Historical    Route: Intravenous    order for DME    2016        Sig: Respironics Dream Station AutoBiPAP 11/6 cm, ComfortGel Blue Pte nasal mask.    Class: Historical    predniSONE (DELTASONE) 5 MG tablet  225 tablet 0 2019    [facility] [address] [place], [place] [id number]    Sig: TAKE TWO AND ONE-HALF TABLETS BY MOUTH DAILY    Class: E-Prescribe    spironolactone (ALDACTONE) 100 MG tablet  5 tablet 2 2019    [facility] [address] [place], [place] [id number]    Sig: TAKE ONE TABLET BY MOUTH ONCE DAILY BEFORE INFUSION    Class: E-Prescribe    Thiamine HCl (VITAMIN  B-1) 50 MG tablet            Sig: Take 50 mg by mouth daily    Class: Historical    Route: Oral    VITAMIN A PO            Sig: Take 8,000 Units by mouth daily 1 Tablet Daily    Class: Historical    Route: Oral    VITAMIN E            Si Units daily 1 Tablet Daily    Class: Historical    Route: Does not apply    carBAMazepine  (CARBATROL) 200 MG 12 hr capsule  180 capsule 3 7/19/2018 10/17/2018   [id number] - [place] [place] - [address] [facility]    Sig: Take 1 capsule (200 mg) by mouth 2 times daily    Class: E-Prescribe    Route: Oral    CELLCEPT (BRAND) 500 MG TABLET  120 tablet 3 2/19/2018 3/21/2018   [id number] - [place], [place] - [address] [facility]    Sig: Take 2 tablets (1,000 mg) by mouth 2 times daily    Class: E-Prescribe    Route: Oral    HEALON 10 MG/ML SOLN intra-ocular inj   3 11/21/2017        Sig: Apply 1 Application topically 4 times daily    Class: Historical    Route: Topical    Immune Globulin, Human, (HIZENTRA) 10 GM/50ML SOLN  4 vial 3 1/21/2019    [facility]    Sig: Inject 70 mLs (14 g) Subcutaneous once a week    Class: Local Print    Route: Subcutaneous      Clinic-Administered Medications as of 7/15/2019       Dose Frequency Start End    lidocaine (PF) (XYLOCAINE) 1 % injection 300 mg 30 mL ONCE 1/4/2018     Sig: Inject 30 mLs (300 mg) Subcutaneous once    Route: Subcutaneous                Review of Systems:   The 10 point Review of Systems is negative other than noted in the HPI         Physical Exam:     /87   Pulse 83   Wt 171 lb (77.6 kg)   SpO2 95%   BMI 29.35 kg/m      Physical Exam:   General: NAD  Head: Normocephalic, atraumatic  Eyes: No conjunctival injection, no scleral icterus.  Mouth: No oral lesions, no erythema or exudate in the oropharynx  Respiratory: No increased work of breathing  Cardiovascular: No lower extremity edema  Abdomen: Soft, non-tender, without organomegaly.  Extremities: Warm, dry  Neurologic:   Mental Status Exam: Alert, awake and easily engaged in interaction.   Cranial Nerves: PERRLA, EOMs intact, no nystagmus, facial movements symmetric,                 facial sensation intact to light touch, hearing intact to conversation, palate and uvula               rise symmetrically, no deviation in uvula or tongue, tongue midline and fully mobile                 with no atrophy or fasciculations.    Motor: Normal tone in all four extremities, with some give away weakness             Manual Motor Exam     Right Left A F  Right Left A F   Shoulder Abduction 4 4   Hip Flexion 3.5        3.5     Elbow Flexion 4.5 4.5   Knee Extension 5 5     Elbow Extension 4 4   Knee Flexion 5 5     Wrist Extension 5 5   Foot Dorsiflexion 5 5     Wrist flexion 5 5   Foot Plantar Flexion 5 5                                                                                                                                                        Reflexes   Right Left  Right Left   Biceps 2 2 Patellar 2 2   Triceps 2 2 Achilles 2 2   Brachioradialis 2 2 Babinski Absent Absent       Coordination and Gait  Gait 0 Normal   Right Left   Romberg 3 Not tested  Heel 2 Not tested 2 Not tested   Tandem 2 Not tested  Toe 2 Not tested 2 Not tested                 Assessment and Recommendations:   Martina Kim is a 63 year old female presents with an 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 She reports on some stabilization of her symptoms since increase in Prednisone.  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 Later however is minor and has minimal symptomatic involvement.   Recommendations:  - Start Cellcept 500 mg twice daily x 1 week, then increase to 1000 mg twice daily.  - Continue Prednisone 10 mg x 2 months then go 7.5 mg.   -Carbamazepin - trial off  -Continue IVIG  Monthly.  -Return to clinic in 6 months or sooner.  I spent total of 30 minutes in face-to-face during today's visit. Over 50% of this time was spent counseling the patient and coordinating care. See note for details.    Frederick Vitale,  MD  [phone]       CC  Patient Care Team:  Taylor Osborn as PCP - General (Internal Medicine)  Frederick Vitale MD as MD (Neurology)  Sean Torres MD as MD (Cardiology)  Charito Marinelli MD as MD (Internal Medicine)    Copy to patient  IVY PETERSON  [address] [place] [address]

## 2019-07-15 NOTE — NURSING NOTE
Chief Complaint   Patient presents with     RECHECK     [facility] RETURN MYASTHENIA GRAVIS 3 MO F/U       Priscila Wyman, EMT

## 2019-07-15 NOTE — PROGRESS NOTES
[facility]                  Muscular Dystrophy Clinic Note     Martina Kim MRN# [id number]   YOB: 1955 Age: 63 year old      Date of Visit: Jul 15, 2019    Primary care provider: Ivan Marie A      History is obtained from the patient, family and medical record       Interval Change:      Martina Kim is a 63 year old female was seen and examined at the muscular dystrophy clinic on Jul 15, 2019 for evaluation of myasthenia gravis. She continues to be symptomatic but stable.  She continues to have genralized fatigue and continues to have fatigable weakness especially in her neck. She has difficulty ambulating for long distances, taking stairs, using her upper extremities. She continue IVIG and has 4 infusions left. Ms. Kim was using Hizentra. Her Prednisone dose is down to 10 mg daily. She developed glaucoma which improved since she dropped Prednisone dose.  She continues to have muscle twitching and is on carbamazepine but benefit is unclear.           Allergies:      Allergies   Allergen Reactions     Bee Venom Anaphylaxis     Contrast Dye Rash     Other reaction(s): Intolerance-Can't Take  Worsens MG symptoms  Worsens MG symptoms  Patient has myasthenia gravis from xray dye given for ct angio head     Fentanyl And Related Nausea and Vomiting     Intractable vomiting     Wasp Venom Protein Anaphylaxis     Codeine Nausea and Vomiting     Fosamax [Alendronic Acid]      Other reaction(s): GI Upset  GERD     Morphine Nausea and Vomiting     Morphine Hcl Nausea and Vomiting     Pt states she is allergic to ALL Narcotics.     Oxycodone Nausea and Vomiting     Percocet [Oxycodone-Acetaminophen] Nausea and Vomiting     Phenytoin Nausea and Vomiting     Other reaction(s): GI Upset             Medications:     Prescription Medications as of 7/15/2019       Rx Number Disp Refills Start End Last Dispensed Date Next Fill Date Owning Pharmacy     AMLODIPINE BESYLATE PO            Sig: Take 10 mg by mouth daily    Class: Historical    Route: Oral    amLODIPine-benazepril (LOTREL) 2.5-10 MG per capsule  90 capsule 3 9/20/2013    [id number] - [place], [place] - [facility] [place] AT [facility]    Sig: Take 1 capsule by mouth daily    Class: E-Prescribe    Route: Oral    Ascorbic Acid (VITAMIN C PO)            Sig: Take 1,000 mg by mouth daily    Class: Historical    Route: Oral    aspirin 81 MG tablet            Sig: Take  by mouth daily. 1 Tablet Daily    Class: Historical    Route: Oral    bimatoprost (LUMIGAN) 0.01 % SOLN            Sig: Place 1 drop into both eyes daily.     Class: Historical    Route: Both Eyes    calcium carbonate (OS-LINDEN 500 MG [place]. [place]) 500 MG tablet            Sig: Take 500 mg by mouth 2 times daily    Class: Historical    Route: Oral    Cholecalciferol (VITAMIN D PO)            Sig: Take 5,000 Units by mouth daily 1 Tablet Daily    Class: Historical    Route: Oral    citalopram (CELEXA) 20 MG tablet  30 tablet 3 5/13/2019    [facility] [address] [place] [address] [address]    Sig: TAKE ONE TABLET BY MOUTH ONCE DAILY    Class: E-Prescribe    clonazePAM (KLONOPIN) 1 MG tablet  180 tablet 0 1/9/2019    [facility] [address] [place] [address] [address]    Sig: Take 1-2 tablets at bedtime as needed for sleep    Class: Local Print    cyclobenzaprine (FLEXERIL) 10 MG tablet  180 tablet 0 1/9/2019    [facility] [address] [place] [address]    Sig: TAKE ONE to TWO TABLETS BY MOUTH AT BEDTIME AS NEEDED FOR MUSCLE SPASM    Class: E-Prescribe    diphenhydrAMINE (BENADRYL) 50 MG capsule            Sig: Take 50 mg by mouth every 6 hours as needed Every 4 hours during infusion    Class: Historical    Route: Oral    fluorometholone (FML LIQUIFILM) 0.1 % ophthalmic susp   1 10/4/2017        Sig: Apply 1 drop to eye 3 times daily    Class: Historical    Route: Ophthalmic    IBUPROFEN  PO            Sig: Take 800 mg by mouth every 8 hours as needed     Class: Historical    Route: Oral    Immune Globulin, Human, 10 GM/50ML SOLN  280 mL 3 2018    [facility] [address] [place] [address] [address]    Sig: Inject 70 mLs (14 g) Subcutaneous once a week    Class: E-Prescribe    Route: Subcutaneous    LANsoprazole (PREVACID) 30 MG capsule            Sig: Take 30 mg by mouth daily 1 Tablet Daily    Class: Historical    Route: Oral    lisinopril (PRINIVIL/ZESTRIL) 20 MG tablet    2019        Sig: Take 20 mg by mouth    Class: Historical    Route: Oral    methylPREDNISolone sodium succinate (SOLU-MEDROL) 125 mg/2 mL injection            Sig: Inject 125 mg into the vein once    Class: Historical    Route: Intravenous    order for DME    2016        Sig: Respironics Dream Station AutoBiPAP 11/6 cm, ComfortGel Blue Pte nasal mask.    Class: Historical    predniSONE (DELTASONE) 5 MG tablet  225 tablet 0 2019    [facility] [address] [place] [address] [address]    Sig: TAKE TWO AND ONE-HALF TABLETS BY MOUTH DAILY    Class: E-Prescribe    spironolactone (ALDACTONE) 100 MG tablet  5 tablet 2 2019    [facility] [address] [place] [address] [address]    Sig: TAKE ONE TABLET BY MOUTH ONCE DAILY BEFORE INFUSION    Class: E-Prescribe    Thiamine HCl (VITAMIN  B-1) 50 MG tablet            Sig: Take 50 mg by mouth daily    Class: Historical    Route: Oral    VITAMIN A PO            Sig: Take 8,000 Units by mouth daily 1 Tablet Daily    Class: Historical    Route: Oral    VITAMIN E            Si Units daily 1 Tablet Daily    Class: Historical    Route: Does not apply    carBAMazepine (CARBATROL) 200 MG 12 hr capsule  180 capsule 3 2018 10/17/2018   [id number] - [place], [place] - [address] [facility]    Sig: Take 1 capsule (200 mg) by mouth 2 times daily    Class: E-Prescribe    Route: Oral    CELLCEPT (BRAND) 500 MG TABLET  120 tablet 3 2018 3/21/2018   [id number] [place], [place] - [address] [facility]    Sig: Take 2 tablets (1,000 mg) by mouth 2 times daily    Class: E-Prescribe    Route: Oral    HEALON 10 MG/ML SOLN intra-ocular inj   3 11/21/2017        Sig: Apply 1 Application topically 4 times daily    Class: Historical    Route: Topical    Immune Globulin, Human, (HIZENTRA) 10 GM/50ML SOLN  4 vial 3 1/21/2019    [facility]    Sig: Inject 70 mLs (14 g) Subcutaneous once a week    Class: Local Print    Route: Subcutaneous      Clinic-Administered Medications as of 7/15/2019       Dose Frequency Start End    lidocaine (PF) (XYLOCAINE) 1 % injection 300 mg 30 mL ONCE 1/4/2018     Sig: Inject 30 mLs (300 mg) Subcutaneous once    Route: Subcutaneous                Review of Systems:   The 10 point Review of Systems is negative other than noted in the HPI         Physical Exam:     /87   Pulse 83   Wt 171 lb (77.6 kg)   SpO2 95%   BMI 29.35 kg/m     Physical Exam:   General: NAD  Head: Normocephalic, atraumatic  Eyes: No conjunctival injection, no scleral icterus.  Mouth: No oral lesions, no erythema or exudate in the oropharynx  Respiratory: No increased work of breathing  Cardiovascular: No lower extremity edema  Abdomen: Soft, non-tender, without organomegaly.  Extremities: Warm, dry  Neurologic:   Mental Status Exam: Alert, awake and easily engaged in interaction.   Cranial Nerves: PERRLA, EOMs intact, no nystagmus, facial movements symmetric,                 facial sensation intact to light touch, hearing intact to conversation, palate and uvula               rise symmetrically, no deviation in uvula or tongue, tongue midline and fully mobile                with no atrophy or fasciculations.    Motor: Normal tone in all four extremities, with some give away weakness             Manual Motor Exam     Right Left A F  Right Left A F   Shoulder Abduction 4 4   Hip Flexion 3.5        3.5     Elbow Flexion 4.5 4.5   Knee Extension 5 5     Elbow Extension  4 4   Knee Flexion 5 5     Wrist Extension 5 5   Foot Dorsiflexion 5 5     Wrist flexion 5 5   Foot Plantar Flexion 5 5                                                                                                                                                        Reflexes   Right Left  Right Left   Biceps 2 2 Patellar 2 2   Triceps 2 2 Achilles 2 2   Brachioradialis 2 2 Babinski Absent Absent       Coordination and Gait  Gait 0 Normal   Right Left   Romberg 3 Not tested  Heel 2 Not tested 2 Not tested   Tandem 2 Not tested  Toe 2 Not tested 2 Not tested                 Assessment and Recommendations:   Martina Kim is a 63 year old female presents with an 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 She reports on some stabilization of her symptoms since increase in Prednisone. 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 Later however is minor and has minimal symptomatic involvement.   Recommendations:  - Start Cellcept 500 mg twice daily x 1 week, then increase to 1000 mg twice daily.  - Continue Prednisone 10 mg x 2 months then go 7.5 mg.   -Carbamazepin - trial off  -Continue IVIG  Monthly.  -Return to clinic in 6 months or sooner.  I spent total of 30 minutes in face-to-face during today's visit. Over 50% of this time was spent counseling the patient and coordinating care. See note for details.    Frederick Vitale MD  [phone]         Patient Care Team:  Taylor Osborn as PCP - General (Internal Medicine)  Frederick Vitale MD as MD (Neurology)  Sean Torres MD as MD (Cardiology)  Charito Marinelli MD as MD (Internal Medicine)    Copy to patient  MARTINA KIM  [address] [place]  [phone]

## 2019-09-06 ENCOUNTER — OFFICE VISIT (OUTPATIENT)
Dept: DERMATOLOGY | Facility: CLINIC | Age: 64
End: 2019-09-06
Payer: MEDICARE

## 2019-09-06 VITALS — SYSTOLIC BLOOD PRESSURE: 116 MMHG | DIASTOLIC BLOOD PRESSURE: 68 MMHG | OXYGEN SATURATION: 94 % | HEART RATE: 72 BPM

## 2019-09-06 DIAGNOSIS — Z79.899 ENCOUNTER FOR LONG-TERM (CURRENT) USE OF HIGH-RISK MEDICATION: ICD-10-CM

## 2019-09-06 DIAGNOSIS — L57.8 PHOTOAGING OF SKIN: ICD-10-CM

## 2019-09-06 DIAGNOSIS — L82.0 INFLAMED SEBORRHEIC KERATOSIS: Primary | ICD-10-CM

## 2019-09-06 RX ORDER — DOXYCYCLINE HYCLATE 50 MG/1
100 CAPSULE ORAL
COMMUNITY

## 2019-09-06 RX ORDER — TRETINOIN 0.25 MG/G
CREAM TOPICAL
Qty: 45 G | Refills: 3 | Status: SHIPPED | OUTPATIENT
Start: 2019-09-06 | End: 2021-05-20

## 2019-09-06 ASSESSMENT — PAIN SCALES - GENERAL: PAINLEVEL: NO PAIN (0)

## 2019-09-06 NOTE — PATIENT INSTRUCTIONS
SUN PROTECTION    WHY PROTECT AGAINST THE SUN?  In the past, sun exposure was thought to be a healthy benefit of outdoor activity. However, studies have shown many unhealthy effects of sun exposure, such as early aging of the skin and skin cancer.    WHAT KIND OF DAMAGE DOES THE SUN EXPOSURE CAUSE?  Part of the sun s energy that reaches earth is composed of rays of invisible ultraviolet (UV) light. When ultraviolet light rays (UVA and UVB) enter the skin, they damage skin cells, causing visible and invisible injuries.    Sunburn is a visible type of damage, which appears just a few hours after sun exposure. In many people this type of damage also causes tanning. Freckles, which occur in people with fair skin, are usually due to sun exposure. Freckles are nearly always a sign that sun damage has occurred, and therefore show the need for sun protection.    Ultraviolet light rays also cause invisible damage to skin cells. Some of the injury is repaired but some of the cell damage adds up year after year. After 20-30 years or more, the built-up damage appears as wrinkles, age spots and even skin cancer.  Although window glass blocks UVB light, UVA rays are able to penetrate through the glass.    HOW CAN I PROTECT MY CHILD FROM EXCESSIVE SUN EXPOSURE?  1. Avoidance. Plan your activities to avoid being in the sun in the middle of the day. Sun exposure is more intense closer to the equator, in the mountains and in the summer. The sun s damaging effects are increased by reflection from water, white sand and snow. Avoid long periods of direct sun exposure. Sit or play in the shade, especially when your shadow is shorter then you are tall. Stay out of the sun during peak hours of 10 am - 2 pm.   2. Use protective clothing.  Cover up with light colored clothing when outdoors including a hat to protect the scalp and face. In addition to filtering out the sun, tightly woven clothing reflects heat and helps keep you feeling  cool. Sunglasses that block ultraviolet rays protect the eyes and eyelids. Multiple retailers now sell clothing and swimwear for adults and children that is made of special fabric that protects against the sun.    3. Apply a broad-spectrum UVA and UVB sunscreen with an SPF of 30 of higher and reapply approximately every two hours, even on cloudy days. If swimming or participating in intense physical activity, sunscreen may need to be applied more often.   4. Infants should be kept out of direct sun and be covered by protective clothing when possible. If sun exposure is unavoidable, sunscreen should be applied to exposed areas (i.e. face, hands).    IS SUNSCREEN SAFE?  Hats, clothing and shade are the most reliable forms of sun protection, but sunscreen is also an important part of protecting your child from the sun. Some have raised concerns about chemical sunscreens and the dangers of absorption. Most of this concern is theoretical, and our providers would be happy to discuss this with you.  Most dermatologists agree that the risk of unprotected sun exposure far outweighs the theoretical risks of sunscreens.      WHAT IF I HAVE AN INFANT OR YOUNG CHILD WITH SENSITIVE SKIN?  The following sunscreens may be better for your child s sensitive skin. The main active ingredients are inert, either titanium dioxide or zinc oxide. These ingredients are less irritating than chemical sunscreens.   Be wary of the word  baby  or  organic : these words don t always mean that the product is hypoallergenic.  Please also note that this list is not all-inclusive, and that we do not formally endorse any of these products.     Aveeno Active Natural Protection Mineral Block Lotion SPF 30  Aveeno Baby Natural Protection Face Stick SPF 50+  Banana Boat Natural Reflect (baby or kids) SPF 50+  Bare Republic SPR 50 Stick   Beauty Countersun Mineral Sunscreen Stick SPF 30  Meigs s Bees Chemical-Free Sunscreen SPF 30  Blue Lizard Baby SPF  30+  Blue Lizard for Sensitive Skin SPF 30+  Cotz Pediatric Pure SPF 30  Cotz Pediatric Face SPF 40  Cotz 20% Zinc SPF 35  CVS Sensitive Skin 30  CVS Baby Lotion Sunscreen SPF 60+  EltaMD UV Physical Broad-Spectrum SPF 41  La Roche-Posay Anthelios Mineral Zinc Oxide Sunscreen SPF 50  Mustella Broad Spectrum SPF 50+/Mineral Sunscreen Stick  Neutrogena Sensitive Skin- Pure and Free Baby SPF 30  Neutrogena Sensitive Skin-Pure and Free Baby  SPF 50+  Neutrogena Sheer Zinc Oxide Dry-Touch Face Sunscreen with Broad Spectrum SPF 50, Oil-Free, Non-Comedogenic & Non-Greasy Mineral Sunscreen  Thinkbaby Safe Sunscreen SPF 50+,   Thinksport Sunscreen SPF 50+,   PreSun Sensitive Sunblock SPF 28  Vanicream Sunscreen for Sensitive Skin SPF 30 or 50  [employer] s Sensitive Skin SPF 70    WHERE CAN I BUY SUN PROTECTIVE CLOTHING AND SWIMWEAR?   Many retailers sell these products.  Coolibar, Solumbra, Sunday Afternoons, and Athleta are some examples.  Many other [employer] children s brands have started selling UV protective swimwear, and we recommend swimsuits that include swim shirts and don t leave extra skin exposed.   UV protective products can also be washed into clothing (eg: Rit Sun Guard Laundry UV Protectant).     SHOULD I WORRY ABOUT MY CHILD NOT GETTING ENOUGH VITAMIN D?  Vitamin D is essential for many processes in the body, and it is important for bone growth in children.  But while the sun is one source of vitamin D, it is also the source of harmful ultraviolet radiation resulting in thousands of skin cancers each year. The official recommendation of the American Academy of Dermatology (AAD) is that vitamin D should be obtained through dietary sources and supplementation rather than from sunlight.     For more information on sun safety and more FAQs about sun protection, visit:  http://www.aad.org/media-resources/stats-and-facts/prevention-and-care/sunscreens    Cryotherapy    What is it?    Use of a very cold liquid, such  as liquid nitrogen, to freeze and destroy abnormal skin cells that need to be removed    What should I expect?    Tenderness and redness    A small blister that might grow and fill with dark purple blood. There may be crusting.    More than one treatment may be needed if the lesions do not go away.    How do I care for the treated area?    Gently wash the area with your hands when bathing.    Use a thin layer of Vaseline to help with healing. You may use a Band-Aid.     The area should heal within 7-10 days and may leave behind a pink or lighter color.     Do not use an antibiotic or Neosporin ointment.     You may take acetaminophen (Tylenol) for pain.     Call your Doctor if you have:    Severe pain    Signs of infection (warmth, redness, cloudy yellow drainage, and or a bad smell)    Questions or concerns    Who should I call with questions?       [facility]: [phone]       [facility]: [phone]       For urgent needs outside of business hours call the [facility] at [phone]        and ask for the dermatology resident on call

## 2019-09-06 NOTE — LETTER
9/6/2019       RE: Martina Kim  [address] [place] [address]     Dear Colleague,    Thank you for referring your patient, Martina Kim, to the [facility] DERMATOLOGY at [facility]. Please see a copy of my visit note below.    [facility] Dermatology Note    Dermatology Problem List:  1. Seborrheic keratosis, inflamed  2. Benign skin findings include seborrheic keratoses, cherry angiomas, banal-appearing melanocytic nevi, lentigines, poikiloderma of civatte  3. Myasthenia gravis on chronic immunosuppression with mycophenolate mofetil and prednisone  4. Photo-aging: tretinoin 0.025% cream every other day    Encounter Date: Sep 6, 2019    CC:   Chief Complaint   Patient presents with     Derm Problem     Martina, is being seen today for a mole check hx of melanoma, a couple of spots on right thigh that have  changed, as reported by patient.       History of Present Illness:  Ms. Martina Kim is a 64 year old female presenting for full body skin check  Two scaly spots of concern on the right medial thigh, states that she picks at them and they are bothersome and slightly sore; states that they have grown in the size recently  Concerned about skin cancer due to chronic immunosuppression for myasthenia gravis  Recently restarted CellCept and weaning prednisone, still takes 125 mg solumedrol with IVIG infusions  Sister was diagnosed with renal cell carcinoma that metastasized to her right shoulder and underwent radioablation; patient wants to make sure she doesn't have any signs of skin cancer   States that she had several blistering sunburns as a child and used to sunbathe with baby oil  No personal history of skin cancer  No immediate family members who have been diagnosed with melanoma  Patient had mole on the right brow removed 10 years ago and a lesion biopsied over 30 years ago on the lower back  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, had a benign mole removed from the right sole of her foot many years ago  No bleeding, itching, non-healing lesions    Past Medical History:   Patient Active Problem List   Diagnosis     Hypertension     Myasthenia gravis without exacerbation (H)     SOB (shortness of breath)     Josh's syndrome     Anxiety     Rosacea conjunctivitis     Hyperlipidemia, unspecified hyperlipidemia     Sleep related hypoxia     Sleep-related hypoventilation due to neuromuscular disorder (H)     Myasthenia gravis with exacerbation (H)     Past Medical History:   Diagnosis Date     Gastro-oesophageal reflux disease      Hypertension      Myasthenia gravis (H)      Myasthenia gravis (H)      Scoliosis, congenital      Past Surgical History:   Procedure Laterality Date     APPENDECTOMY       BIOPSY       BREAST SURGERY       ENT SURGERY       GYN SURGERY       INSERT CATHETER VASCULAR ACCESS Right 12/20/2017    Procedure: INSERT CATHETER VASCULAR ACCESS;  Tunneled central venous catheter placement;  Surgeon: Alex Montes PA-C;  Location:  OR     PHACOEMULSIFICATION CLEAR CORNEA WITH STANDARD INTRAOCULAR LENS IMPLANT Left 4/8/2015    Procedure: PHACOEMULSIFICATION CLEAR CORNEA WITH STANDARD INTRAOCULAR LENS IMPLANT;  Surgeon: Perfecto Arrieta MD;  Location: [facility]       Social History:  Patient reports that she quit smoking about 22 years ago. Her smoking use included cigarettes. She has quit using smokeless tobacco. She reports that she drinks about 3.0 oz of alcohol per week. She reports that she does not use drugs.    Family History:  Family History   Problem Relation Age of Onset     C.A.D. Mother      Alzheimer Disease Father        Medications:  Current Outpatient Medications   Medication Sig Dispense Refill     AMLODIPINE BESYLATE PO Take 10 mg by mouth daily       amLODIPine-benazepril (LOTREL) 2.5-10 MG per capsule Take 1 capsule by mouth daily 90 capsule 3      Ascorbic Acid (VITAMIN C PO) Take 1,000 mg by mouth daily       aspirin 81 MG tablet Take  by mouth daily. 1 Tablet Daily       bimatoprost (LUMIGAN) 0.01 % SOLN Place 1 drop into both eyes daily.        calcium carbonate (OS-LINDEN 500 MG "[employer]. [place]) 500 MG tablet Take 500 mg by mouth 2 times daily       Cholecalciferol (VITAMIN D PO) Take 5,000 Units by mouth daily 1 Tablet Daily       citalopram (CELEXA) 20 MG tablet TAKE ONE TABLET BY MOUTH ONCE DAILY 30 tablet 3     clonazePAM (KLONOPIN) 1 MG tablet Take 1-2 tablets at bedtime as needed for sleep 180 tablet 0     cyclobenzaprine (FLEXERIL) 10 MG tablet TAKE ONE to TWO TABLETS BY MOUTH AT BEDTIME AS NEEDED FOR MUSCLE SPASM 180 tablet 0     diphenhydrAMINE (BENADRYL) 50 MG capsule Take 50 mg by mouth every 6 hours as needed Every 4 hours during infusion       fluorometholone (FML LIQUIFILM) 0.1 % ophthalmic susp Apply 1 drop to eye 3 times daily  1     IBUPROFEN PO Take 800 mg by mouth every 8 hours as needed        Immune Globulin, Human, (HIZENTRA) 10 GM/50ML SOLN Inject 70 mLs (14 g) Subcutaneous once a week 4 vial 3     Immune Globulin, Human, 10 GM/50ML SOLN Inject 70 mLs (14 g) Subcutaneous once a week 280 mL 3     LANsoprazole (PREVACID) 30 MG capsule Take 30 mg by mouth daily 1 Tablet Daily       lisinopril (PRINIVIL/ZESTRIL) 20 MG tablet Take 20 mg by mouth       methylPREDNISolone sodium succinate (SOLU-MEDROL) 125 mg/2 mL injection Inject 125 mg into the vein once       mycophenolate (GENERIC EQUIVALENT) 500 MG tablet Take 2 tablets (1,000 mg) by mouth 2 times daily 120 tablet 5     order for [facility] RespirBigDNA Dream Station AutoBiPAP 11/6 cm, ComfortGel Blue Pte nasal mask.       predniSONE (DELTASONE) 5 MG tablet TAKE TWO AND ONE-HALF TABLETS BY MOUTH DAILY (Patient taking differently: TAKE TWO TABS, 10MG DAILY) 225 tablet 0     spironolactone (ALDACTONE) 100 MG tablet TAKE ONE TABLET BY MOUTH ONCE DAILY BEFORE INFUSION 5 tablet 2     Thiamine HCl (VITAMIN   B-1) 50 MG tablet Take 50 mg by mouth daily       VITAMIN A PO Take 8,000 Units by mouth daily 1 Tablet Daily       VITAMIN E 400 Units daily 1 Tablet Daily       carBAMazepine (CARBATROL) 200 MG 12 hr capsule Take 1 capsule (200 mg) by mouth 2 times daily 180 capsule 3     doxycycline hyclate (VIBRAMYCIN) 50 MG capsule Take 100 mg by mouth       HEALON 10 MG/ML SOLN intra-ocular inj Apply 1 Application topically 4 times daily  3        Allergies:  Allergies   Allergen Reactions     Bee Venom Anaphylaxis     Contrast Dye Rash     Other reaction(s): Intolerance-Can't Take  Worsens MG symptoms  Worsens MG symptoms  Patient has myasthenia gravis from xray dye given for ct angio head     Fentanyl And Related Nausea and Vomiting     Intractable vomiting     Wasp Venom Protein Anaphylaxis     Codeine Nausea and Vomiting     Fosamax [Alendronic Acid]      Other reaction(s): GI Upset  GERD     Morphine Nausea and Vomiting     Morphine Hcl Nausea and Vomiting     Pt states she is allergic to ALL Narcotics.     Oxycodone Nausea and Vomiting     Percocet [Oxycodone-Acetaminophen] Nausea and Vomiting     Phenytoin Nausea and Vomiting     Other reaction(s): GI Upset       Review of Systems:  10 point review of systems including constitutional, HEENT, CV, pulmonary, GI, musculoskeletal, neurologic, endocrine, hematologic/immunologic was performed and was negative.  Constitutional: Otherwise feeling well today, in usual state of health.  HEENT: Patient denies nonhealing oral sores.    Physical exam:  Vitals: /68 (BP Location: Right arm, Patient Position: Chair, Cuff Size: Adult Regular)   Pulse 72   SpO2 94%   General: in no acute distress, well-developed, well-nourished  Eyes: conjunctivae clear  Neck: supple, no lymphadenopathy  Pulmonary: breathing comfortably in no distress  CV: well-perfused, no cyanosis  Abdominal: no distension  Extremities: no deformity, no edema    Skin:   Skin examined including inspection and  palpation of the skin and subcutaneous tissues of the scalp, face, neck, chest, abdomen, back, bilateral upper extremities, bilateral lower extremities, buttocks      Poikilodermatous change with alternating hypopigmentation, hyperpigmentation, atrophy and telangiectasias of the decolletage area  Waxy stuck-on tan to brown macules and papules scattered on the trunk, legs  Two brown stuck-on scaly papules on the right medial thigh  Dome shaped bright red papules on the arms and trunk  Regular brown pigmented macules and papules are identified on the trunk, arms, legs  Well-defined, symmetric brown pigmented macules and papules on the trunk with a reassuring reticular pattern on dermoscopy    Impression/Plan:    1. Seborrheic keratosis, inflamed    Cryotherapy procedure note: After verbal consent and discussion of risks and benefits including but no limited to dyspigmentation/scar, blister, and pain, 4 lesions was treated with 1-2mm freeze border for 2 cycles with liquid nitrogen. Post cryotherapy instructions were provided.     2. Benign skin findings include seborrheic keratoses, cherry angiomas, banal-appearing melanocytic nevi, lentigines, poikiloderma of civatte    Lesions benign nature, no treatment is indicated at this time, will monitor for any clinical changes    3. Immunosuppression due to chronic prednisone, mycophenolate mofetil for myasthenia gravis    Recommended use of a broad spectrum sunscreen of SPF 30 ore more on all sun exposed sites daily; apply 20 minutes prior to exposure and repeat application every two hours or after sweating or swimming; avoid any intentional indoor or outdoor tanning    Recommend sun protective clothing and hats    ABCD's of melanoma were reviewed with patient    4. Photo-aging    Tretinoin 0.025% cream every other day; counseled on side effects of xerosis and irritation; recommended to start every other night, then increase to nightly as tolerated; follow with  non-comedogenic moisturizer    Follow-up in 1 year for a full-body skin check    Faculty:  Dr. Rooney    Staff Involved:  Resident/Staff    Yonatan Chow MD  Dermatology Resident, PGY2  [facility]    Staff Physician Comments:   I saw and evaluated the patient with the resident and I edited the assessment and plan as documented in the note. I was present for the entire minor procedure and examination.    Doug Rooney MD   of Dermatology  Department of Dermatology  [facility] [employer]

## 2019-09-06 NOTE — NURSING NOTE
Dermatology Rooming Note    Martina Tammy Julio's goals for this visit include:   Chief Complaint   Patient presents with     Derm Problem     Martina, is being seen today for a mole check hx of melanoma, a couple of spots on right thigh that have  changed, as reported by patient.     Dana Diaz LPN

## 2019-09-06 NOTE — PROGRESS NOTES
[facility] Dermatology Note    Dermatology Problem List:  1. Seborrheic keratosis, inflamed  2. Benign skin findings include seborrheic keratoses, cherry angiomas, banal-appearing melanocytic nevi, lentigines, poikiloderma of civatte  3. Myasthenia gravis on chronic immunosuppression with mycophenolate mofetil and prednisone  4. Photo-aging: tretinoin 0.025% cream every other day    Encounter Date: Sep 6, 2019    CC:   Chief Complaint   Patient presents with     Derm Problem     Martina, is being seen today for a mole check hx of melanoma, a couple of spots on right thigh that have  changed, as reported by patient.       History of Present Illness:  Ms. Martina Kim is a 64 year old female presenting for full body skin check  Two scaly spots of concern on the right medial thigh, states that she picks at them and they are bothersome and slightly sore; states that they have grown in the size recently  Concerned about skin cancer due to chronic immunosuppression for myasthenia gravis  Recently restarted CellCept and weaning prednisone, still takes 125 mg solumedrol with IVIG infusions  Sister was diagnosed with renal cell carcinoma that metastasized to her right shoulder and underwent radioablation; patient wants to make sure she doesn't have any signs of skin cancer   States that she had several blistering sunburns as a child and used to sunbathe with baby oil  No personal history of skin cancer  No immediate family members who have been diagnosed with melanoma  Patient had mole on the right brow removed 10 years ago and a lesion biopsied over 30 years ago on the lower back  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, had a benign mole removed from the right sole of her foot many years ago  No bleeding, itching, non-healing lesions    Past Medical History:   Patient Active Problem List   Diagnosis     Hypertension     Myasthenia gravis without exacerbation  (H)     SOB (shortness of breath)     Josh's syndrome     Anxiety     Rosacea conjunctivitis     Hyperlipidemia, unspecified hyperlipidemia     Sleep related hypoxia     Sleep-related hypoventilation due to neuromuscular disorder (H)     Myasthenia gravis with exacerbation (H)     Past Medical History:   Diagnosis Date     Gastro-oesophageal reflux disease      Hypertension      Myasthenia gravis (H)      Myasthenia gravis (H)      Scoliosis, congenital      Past Surgical History:   Procedure Laterality Date     APPENDECTOMY       BIOPSY       BREAST SURGERY       ENT SURGERY       GYN SURGERY       INSERT CATHETER VASCULAR ACCESS Right 12/20/2017    Procedure: INSERT CATHETER VASCULAR ACCESS;  Tunneled central venous catheter placement;  Surgeon: Alex Montes PA-C;  Location: [facility] OR     PHACOEMULSIFICATION CLEAR CORNEA WITH STANDARD INTRAOCULAR LENS IMPLANT Left 4/8/2015    Procedure: PHACOEMULSIFICATION CLEAR CORNEA WITH STANDARD INTRAOCULAR LENS IMPLANT;  Surgeon: Perfecto Arrieta MD;  Location: [facility]       Social History:  Patient reports that she quit smoking about 22 years ago. Her smoking use included cigarettes. She has quit using smokeless tobacco. She reports that she drinks about 3.0 oz of alcohol per week. She reports that she does not use drugs.    Family History:  Family History   Problem Relation Age of Onset     C.A.D. Mother      Alzheimer Disease Father        Medications:  Current Outpatient Medications   Medication Sig Dispense Refill     AMLODIPINE BESYLATE PO Take 10 mg by mouth daily       amLODIPine-benazepril (LOTREL) 2.5-10 MG per capsule Take 1 capsule by mouth daily 90 capsule 3     Ascorbic Acid (VITAMIN C PO) Take 1,000 mg by mouth daily       aspirin 81 MG tablet Take  by mouth daily. 1 Tablet Daily       bimatoprost (LUMIGAN) 0.01 % SOLN Place 1 drop into both eyes daily.        calcium carbonate (OS-LINDEN 500 MG [place]. [place]) 500 MG tablet Take 500 mg by mouth 2 times daily        Cholecalciferol (VITAMIN D PO) Take 5,000 Units by mouth daily 1 Tablet Daily       citalopram (CELEXA) 20 MG tablet TAKE ONE TABLET BY MOUTH ONCE DAILY 30 tablet 3     clonazePAM (KLONOPIN) 1 MG tablet Take 1-2 tablets at bedtime as needed for sleep 180 tablet 0     cyclobenzaprine (FLEXERIL) 10 MG tablet TAKE ONE to TWO TABLETS BY MOUTH AT BEDTIME AS NEEDED FOR MUSCLE SPASM 180 tablet 0     diphenhydrAMINE (BENADRYL) 50 MG capsule Take 50 mg by mouth every 6 hours as needed Every 4 hours during infusion       fluorometholone (FML LIQUIFILM) 0.1 % ophthalmic susp Apply 1 drop to eye 3 times daily  1     IBUPROFEN PO Take 800 mg by mouth every 8 hours as needed        Immune Globulin, Human, (HIZENTRA) 10 GM/50ML SOLN Inject 70 mLs (14 g) Subcutaneous once a week 4 vial 3     Immune Globulin, Human, 10 GM/50ML SOLN Inject 70 mLs (14 g) Subcutaneous once a week 280 mL 3     LANsoprazole (PREVACID) 30 MG capsule Take 30 mg by mouth daily 1 Tablet Daily       lisinopril (PRINIVIL/ZESTRIL) 20 MG tablet Take 20 mg by mouth       methylPREDNISolone sodium succinate (SOLU-MEDROL) 125 mg/2 mL injection Inject 125 mg into the vein once       mycophenolate (GENERIC EQUIVALENT) 500 MG tablet Take 2 tablets (1,000 mg) by mouth 2 times daily 120 tablet 5     [facility] [place] AutoBiPAP 11/6 cm, ComfortGel Blue Pte nasal mask.       predniSONE (DELTASONE) 5 MG tablet TAKE TWO AND ONE-HALF TABLETS BY MOUTH DAILY (Patient taking differently: TAKE TWO TABS, 10MG DAILY) 225 tablet 0     spironolactone (ALDACTONE) 100 MG tablet TAKE ONE TABLET BY MOUTH ONCE DAILY BEFORE INFUSION 5 tablet 2     Thiamine HCl (VITAMIN  B-1) 50 MG tablet Take 50 mg by mouth daily       VITAMIN A PO Take 8,000 Units by mouth daily 1 Tablet Daily       VITAMIN E 400 Units daily 1 Tablet Daily       carBAMazepine (CARBATROL) 200 MG 12 hr capsule Take 1 capsule (200 mg) by mouth 2 times daily 180 capsule 3     doxycycline hyclate  (VIBRAMYCIN) 50 MG capsule Take 100 mg by mouth       HEALON 10 MG/ML SOLN intra-ocular inj Apply 1 Application topically 4 times daily  3        Allergies:  Allergies   Allergen Reactions     Bee Venom Anaphylaxis     Contrast Dye Rash     Other reaction(s): Intolerance-Can't Take  Worsens MG symptoms  Worsens MG symptoms  Patient has myasthenia gravis from xray dye given for ct angio head     Fentanyl And Related Nausea and Vomiting     Intractable vomiting     Wasp Venom Protein Anaphylaxis     Codeine Nausea and Vomiting     Fosamax [Alendronic Acid]      Other reaction(s): GI Upset  GERD     Morphine Nausea and Vomiting     Morphine Hcl Nausea and Vomiting     Pt states she is allergic to ALL Narcotics.     Oxycodone Nausea and Vomiting     Percocet [Oxycodone-Acetaminophen] Nausea and Vomiting     Phenytoin Nausea and Vomiting     Other reaction(s): GI Upset       Review of Systems:  10 point review of systems including constitutional, HEENT, CV, pulmonary, GI, musculoskeletal, neurologic, endocrine, hematologic/immunologic was performed and was negative.  Constitutional: Otherwise feeling well today, in usual state of health.  HEENT: Patient denies nonhealing oral sores.    Physical exam:  Vitals: /68 (BP Location: Right arm, Patient Position: Chair, Cuff Size: Adult Regular)   Pulse 72   SpO2 94%   General: in no acute distress, well-developed, well-nourished  Eyes: conjunctivae clear  Neck: supple, no lymphadenopathy  Pulmonary: breathing comfortably in no distress  CV: well-perfused, no cyanosis  Abdominal: no distension  Extremities: no deformity, no edema    Skin:   Skin examined including inspection and palpation of the skin and subcutaneous tissues of the scalp, face, neck, chest, abdomen, back, bilateral upper extremities, bilateral lower extremities, buttocks      Poikilodermatous change with alternating hypopigmentation, hyperpigmentation, atrophy and telangiectasias of the decolletage  area  Waxy stuck-on tan to brown macules and papules scattered on the trunk, legs  Two brown stuck-on scaly papules on the right medial thigh  Dome shaped bright red papules on the arms and trunk  Regular brown pigmented macules and papules are identified on the trunk, arms, legs  Well-defined, symmetric brown pigmented macules and papules on the trunk with a reassuring reticular pattern on dermoscopy    Impression/Plan:    1. Seborrheic keratosis, inflamed    Cryotherapy procedure note: After verbal consent and discussion of risks and benefits including but no limited to dyspigmentation/scar, blister, and pain, 4 lesions was treated with 1-2mm freeze border for 2 cycles with liquid nitrogen. Post cryotherapy instructions were provided.     2. Benign skin findings include seborrheic keratoses, cherry angiomas, banal-appearing melanocytic nevi, lentigines, poikiloderma of civatte    Lesions benign nature, no treatment is indicated at this time, will monitor for any clinical changes    3. Immunosuppression due to chronic prednisone, mycophenolate mofetil for myasthenia gravis    Recommended use of a broad spectrum sunscreen of SPF 30 ore more on all sun exposed sites daily; apply 20 minutes prior to exposure and repeat application every two hours or after sweating or swimming; avoid any intentional indoor or outdoor tanning    Recommend sun protective clothing and hats    ABCD's of melanoma were reviewed with patient    4. Photo-aging    Tretinoin 0.025% cream every other day; counseled on side effects of xerosis and irritation; recommended to start every other night, then increase to nightly as tolerated; follow with non-comedogenic moisturizer    Follow-up in 1 year for a full-body skin check    Faculty:  Dr. Rooney    Staff Involved:  Resident/Staff    Yonatan Chow MD  Dermatology Resident, PGY2  [facility]    Staff Physician Comments:   I saw and evaluated the patient with the resident and I edited the  assessment and plan as documented in the note. I was present for the entire minor procedure and examination.    Doug Rooney MD   of Dermatology  Department of Dermatology  [facility]

## 2019-09-09 DIAGNOSIS — G70.00 MYASTHENIA GRAVIS WITHOUT EXACERBATION (H): ICD-10-CM

## 2019-09-09 RX ORDER — PREDNISONE 5 MG/1
TABLET ORAL
Qty: 180 TABLET | Refills: 1 | Status: SHIPPED | OUTPATIENT
Start: 2019-09-09 | End: 2020-06-18

## 2019-09-09 RX ORDER — CITALOPRAM HYDROBROMIDE 20 MG/1
TABLET ORAL
Qty: 90 TABLET | Refills: 1 | Status: SHIPPED | OUTPATIENT
Start: 2019-09-09 | End: 2020-03-03

## 2019-09-09 NOTE — TELEPHONE ENCOUNTER
Rx Authorization:    Requested Medication/ Dose Citalopram 20 mg tabs     Date last refill ordered: 05/13/19    Quantity ordered: 30    # refills: 3    Date of last clinic visit with ordering provider: 07/15/19    Date of next clinic visit with ordering provider: 11/18/19    All pertinent protocol data (lab date/result):     Include pertinent information from patients message:       Rx Authorization:    Requested Medication/ Dose Prednisone 5 mg tabs     Date last refill ordered: 05/13/19    Quantity ordered: 225    # refills: 0    Date of last clinic visit with ordering provider: 07/15/19    Date of next clinic visit with ordering provider: 11/18/19    All pertinent protocol data (lab date/result):     Include pertinent information from patients message:

## 2019-09-10 ENCOUNTER — TELEPHONE (OUTPATIENT)
Dept: DERMATOLOGY | Facility: CLINIC | Age: 64
End: 2019-09-10

## 2019-09-10 NOTE — TELEPHONE ENCOUNTER
Prior Authorization Retail Medication Request    Medication/Dose: Tretinoin 0.025% cream  ICD code (if different than what is on RX):  L57.8  Previously Tried and Failed:  Spironolactone  Rationale:      Insurance Name:  Medicare  Insurance ID:  [id number]    Key: GRAYSON

## 2019-09-13 NOTE — TELEPHONE ENCOUNTER
PA Initiation    Medication: Tretinoin 0.025% cream -   Insurance Company: [employer] - Phone [phone] Fax [phone]  Pharmacy Filling the Rx: [id number] - [place] - [address]  Filling Pharmacy Phone: [phone]  Filling Pharmacy Fax: [phone]  Start Date: 9/13/2019

## 2019-09-16 NOTE — TELEPHONE ENCOUNTER
Prior Authorization Approval    Medication: Tretinoin 0.025% cream - APPROVED was approved on 9/13/2019  Effective: 8/14/2019 to 9/12/2020  Reference #: [id number]  Approved Dose/Quantity:   Insurance Company: [employer] - Phone [phone] Fax [phone]  Expected CoPay:    Pharmacy Filling the Rx: [id number] - [place], [place] - [address]  Pharmacy Notified: Yes  Patient Notified: Comment:  **Instructed pharmacy to notify patient when script is ready to /ship.**

## 2019-11-02 ENCOUNTER — HEALTH MAINTENANCE LETTER (OUTPATIENT)
Age: 64
End: 2019-11-02

## 2019-11-08 NOTE — TELEPHONE ENCOUNTER
RECORDS RECEIVED FROM: Internal/Care Everywhere   DATE RECEIVED: 12-9-19   NOTES STATUS DETAILS   OFFICE NOTE from referring provider    N/A Self referred   OFFICE NOTE from other cardiologist    Internal Saw Dr. Torres 3-23-16   DISCHARGE SUMMARY from hospital    N/A    DISCHARGE REPORT from the ER   N/A    OPERATIVE REPORT    N/A    MEDICATION LIST   Internal    LABS     BMP   Internal 7-9-18   CBC   Internal 8-6-19   CMP   Care Everywhere 6-28-19   Lipids   Care Everywhere 6-28-19   TSH   Care Everywhere 6-28-19   DIAGNOSTIC PROCEDURES     EKG   Received 5-23-18   Monitor Reports   N/A    IMAGING (DISC & REPORT)      Echo   Received 9-18-18   Stress Tests   N/A    Cath   N/A    MRI/MRA   N/A    CT/CTA   N/A      Action    Action Taken 11-8: Sent request to [facility] for 9-19-18 echo   11-8: Requested ekg 5-23-18 from [facility]  11-29: sent second request to [facility] and [facility]     Action 12.2.19 MJ 9:10 AM   Action Taken Received 5.23.18 EKG strips     Action 12.5.19 MJ 3:18 PM   Action Taken Pulled echo into system.

## 2019-11-18 ENCOUNTER — OFFICE VISIT (OUTPATIENT)
Dept: NEUROLOGY | Facility: CLINIC | Age: 64
End: 2019-11-18
Payer: MEDICARE

## 2019-11-18 VITALS
BODY MASS INDEX: 28.2 KG/M2 | HEIGHT: 64 IN | WEIGHT: 165.2 LBS | DIASTOLIC BLOOD PRESSURE: 87 MMHG | OXYGEN SATURATION: 95 % | SYSTOLIC BLOOD PRESSURE: 150 MMHG | HEART RATE: 89 BPM

## 2019-11-18 DIAGNOSIS — G70.01 MG WITH EXACERBATION (MYASTHENIA GRAVIS) (H): Primary | ICD-10-CM

## 2019-11-18 ASSESSMENT — PAIN SCALES - GENERAL: PAINLEVEL: MODERATE PAIN (4)

## 2019-11-18 ASSESSMENT — ENCOUNTER SYMPTOMS
HEADACHES: 0
EYE WATERING: 0
SHORTNESS OF BREATH: 1
DECREASED CONCENTRATION: 1
ARTHRALGIAS: 1
BRUISES/BLEEDS EASILY: 1
DISTURBANCES IN COORDINATION: 1
DYSPNEA ON EXERTION: 1
HEMOPTYSIS: 0
NERVOUS/ANXIOUS: 1
MUSCLE CRAMPS: 1
DEPRESSION: 1
SWOLLEN GLANDS: 0
WEIGHT LOSS: 1
POLYDIPSIA: 0
FATIGUE: 1
SNORES LOUDLY: 0
CONSTIPATION: 0
MYALGIAS: 1
INSOMNIA: 1
HYPOTENSION: 1
INCREASED ENERGY: 1
WHEEZING: 0
LEG PAIN: 0
ORTHOPNEA: 0
ABDOMINAL PAIN: 0
HALLUCINATIONS: 0
LOSS OF CONSCIOUSNESS: 0
VOMITING: 0
PANIC: 0
DIZZINESS: 1
DECREASED APPETITE: 1
NAUSEA: 0
JOINT SWELLING: 0
DIARRHEA: 0
SPUTUM PRODUCTION: 0
MEMORY LOSS: 0
DYSURIA: 0
HEMATURIA: 0
PALPITATIONS: 1
WEIGHT GAIN: 0
SLEEP DISTURBANCES DUE TO BREATHING: 0
NECK PAIN: 1
ALTERED TEMPERATURE REGULATION: 1
CHILLS: 0
LIGHT-HEADEDNESS: 1
NIGHT SWEATS: 1
DOUBLE VISION: 0
HYPERTENSION: 1
SYNCOPE: 0
SPEECH CHANGE: 0
FEVER: 0
COUGH DISTURBING SLEEP: 0
EYE PAIN: 0
BACK PAIN: 1
HEARTBURN: 0
POSTURAL DYSPNEA: 0
BLOATING: 0
SEIZURES: 0

## 2019-11-18 ASSESSMENT — MIFFLIN-ST. JEOR: SCORE: 1274.34

## 2019-11-18 NOTE — LETTER
11/18/2019       RE: Martina Kim  [address] [place] [address]     Dear Colleague,    Thank you for referring your patient, Martina Kim, to the [facility] NEUROLOGY at [facility]. Please see a copy of my visit note below.             [facility]                  Muscular Dystrophy Clinic Note     Martina Kim MRN# [id number]   YOB: 1955 Age: 64 year old      Date of Visit: Nov 18, 2019    Primary care provider: Taylor Osborn           History is obtained from the patient, family and medical record       Interval Change:      Martina Kim is a 64 year old female was seen and examined at the muscular dystrophy clinic on Nov 18, 2019 for evaluation of myasthenia gravis. She has been relatively stable. She stopped Cellcept in October due to side effects. This is her second attempt over the years. She had concerns regarding blood pressure increase and tachycardia. She started on hydrochlorotize. She is currently on Prednisone 2.5 mg. She was taking clonazepam for insomnia but discontinued it. She takes melatonin and 2 flexeril pills with some benefit. She is on IVIG every 2 weeks with 2 or 3 doses left. SHe reports no falls.  She continues to have fasciculations and now stopped taking carbamazepin with no significant change. She was on Azathioprine.   MG symptoms (bold reflects current status)  Symptoms     Comment   General    Normal Good Fair Poor    Diplopia   None Rare Frequent Constant    Ptosis   None Rare Frequent Constant    UE  weakness None Slightly limited Some ADL difficulties ADL limited    LE  weakness None Run/walk fatigue Short distance Minimal walking    Dysarthria   None Mild Moderate Severe    Voice   Normal Fade Soft Very quiet    Chew fatigue None Fatigue Fatigue soft food Tube feeding    Dysphagia   None Normal food Soft food Tube feeding    Dyspnea None Normal effort Any effort At  rest/ Resp support             Myasthenia Gravisd Activities of Daily Living (MG-ADL)    Function None=0 Mild=1 Moderate=2 Severe=3 Score   Talking Normal Intermittent slurring or nasal speech Constant slurring or nasal speech, but can be understood Difficult to understand    Chewing Normal Fatigue with solid food Fatigue with soft food Gastric tube    Swallowing Normal Rare episode of chocking Frequent chocking necessitating changes in diet Gastric tube    Breathing Normal Shortness of breath with exertion Shortness of breath at rest Ventilator dependence    Impairment of ability to brush teeth and comb hairs Normal Extra effort, but no rest periods needed Rest periods needed Cannot do one of these functions    Impairment of ability to arise from a chair Normal Mild, sometimes uses arms Moderate, always uses arms Severe, requires assistance    Double vision Normal Occurs but not daily Daily, but not constant Constant    Eyelid droop Normal Occurs but not daily Daily, but not constant Constant               Allergies:      Allergies   Allergen Reactions     Bee Venom Anaphylaxis     Contrast Dye Rash     Other reaction(s): Intolerance-Can't Take  Worsens MG symptoms  Worsens MG symptoms  Patient has myasthenia gravis from xray dye given for ct angio head     Fentanyl And Related Nausea and Vomiting     Intractable vomiting     Wasp Venom Protein Anaphylaxis     Codeine Nausea and Vomiting     Fosamax [Alendronic Acid]      Other reaction(s): GI Upset  GERD     Morphine Nausea and Vomiting     Morphine Hcl Nausea and Vomiting     Pt states she is allergic to ALL Narcotics.     Oxycodone Nausea and Vomiting     Percocet [Oxycodone-Acetaminophen] Nausea and Vomiting     Phenytoin Nausea and Vomiting     Other reaction(s): GI Upset             Medications:     Prescription Medications as of 11/18/2019       Rx Number Disp Refills Start End Last Dispensed Date Next Fill Date Owning Pharmacy    Ascorbic Acid (VITAMIN C  PO)            Sig: Take 1,000 mg by mouth daily    Class: Historical    Route: Oral    aspirin 81 MG tablet            Sig: Take  by mouth daily. 1 Tablet Daily    Class: Historical    Route: Oral    bimatoprost (LUMIGAN) 0.01 % SOLN            Sig: Place 1 drop into both eyes daily.     Class: Historical    Route: Both Eyes    calcium carbonate (OS-LINDEN 500 MG [place]. [place]) 500 MG tablet            Sig: Take 500 mg by mouth 2 times daily    Class: Historical    Route: Oral    Cholecalciferol (VITAMIN D PO)            Sig: Take 5,000 Units by mouth daily 1 Tablet Daily    Class: Historical    Route: Oral    citalopram (CELEXA) 20 MG tablet  90 tablet 1 9/9/2019    [id number] - [place], [place] - [address] [facility]    Sig: TAKE ONE TABLET BY MOUTH ONCE DAILY    Class: E-Prescribe    cyclobenzaprine (FLEXERIL) 10 MG tablet  180 tablet 0 1/9/2019    [facility] [address] [place] [address] [address]    Sig: TAKE ONE to TWO TABLETS BY MOUTH AT BEDTIME AS NEEDED FOR MUSCLE SPASM    Class: E-Prescribe    diphenhydrAMINE (BENADRYL) 50 MG capsule            Sig: Take 50 mg by mouth every 6 hours as needed Every 4 hours during infusion    Class: Historical    Route: Oral    doxycycline hyclate (VIBRAMYCIN) 50 MG capsule        [facility] [address] [place] [address] [address]    Sig: Take 100 mg by mouth    Class: Historical    Route: Oral    IBUPROFEN PO            Sig: Take 800 mg by mouth every 8 hours as needed     Class: Historical    Route: Oral    Immune Globulin, Human, 10 GM/50ML SOLN  280 mL 3 9/25/2018    [facility] [address] [place] [address] [address]    Sig: Inject 70 mLs (14 g) Subcutaneous once a week    Class: E-Prescribe    Route: Subcutaneous    LANsoprazole (PREVACID) 30 MG capsule            Sig: Take 30 mg by mouth daily 1 Tablet Daily    Class: Historical    Route: Oral    lisinopril (PRINIVIL/ZESTRIL) 20 MG tablet    6/28/2019        Sig: Take 20 mg by mouth    Class: Historical     Route: Oral    methylPREDNISolone sodium succinate (SOLU-MEDROL) 125 mg/2 mL injection            Sig: Inject 125 mg into the vein once    Class: Historical    Route: Intravenous    spironolactone (ALDACTONE) 100 MG tablet  5 tablet 2 2019    [facility] [address] [place], [place] [id number]    Sig: TAKE ONE TABLET BY MOUTH ONCE DAILY BEFORE INFUSION    Class: E-Prescribe    Thiamine HCl (VITAMIN  B-1) 50 MG tablet            Sig: Take 50 mg by mouth daily    Class: Historical    Route: Oral    VITAMIN A PO            Sig: Take 8,000 Units by mouth daily 1 Tablet Daily    Class: Historical    Route: Oral    VITAMIN E            Si Units daily 1 Tablet Daily    Class: Historical    Route: Does not apply    AMLODIPINE BESYLATE PO            Sig: Take 2.5 mg by mouth daily     Class: Historical    Route: Oral    amLODIPine-benazepril (LOTREL) 2.5-10 MG per capsule  90 capsule 3 2013    [id number] - [place], [place] - [address] [place] [address] [address] [facility]    Sig: Take 1 capsule by mouth daily    Class: E-Prescribe    Route: Oral    clonazePAM (KLONOPIN) 1 MG tablet  180 tablet 0 2019    [facility] [address] [place], [place] [id number]    Sig: Take 1-2 tablets at bedtime as needed for sleep    Class: Local Print    fluorometholone (FML LIQUIFILM) 0.1 % ophthalmic susp   1 10/4/2017        Sig: Apply 1 drop to eye 3 times daily    Class: Historical    Route: Ophthalmic    HEALON 10 MG/ML SOLN intra-ocular inj   3 2017        Sig: Apply 1 Application topically 4 times daily    Class: Historical    Route: Topical    Immune Globulin, Human, (HIZENTRA) 10 GM/50ML SOLN  4 vial 3 2019    [facility]    Sig: Inject 70 mLs (14 g) Subcutaneous once a week    Class: Local Print    Route: Subcutaneous    mycophenolate (GENERIC EQUIVALENT) 500 MG tablet  120 tablet 5 7/15/2019    [facility] [address] [place], [place] [id number]    Sig: Take  "2 tablets (1,000 mg) by mouth 2 times daily    Class: E-Prescribe    Route: Oral    order for DME    7/29/2016        Sig: Respironics [place] AutoBiPAP 11/6 cm, ComfortGel Blue Pte nasal mask.    Class: Historical    predniSONE (DELTASONE) 5 MG tablet  180 tablet 1 9/9/2019    [id number] - [place], [place] - [address] [facility]    Sig: TAKE TWO TABS, 10MG DAILY    Class: E-Prescribe    tretinoin (RETIN-A) 0.025 % external cream  45 g 3 9/6/2019    [id number] - [place], [place] - [address] [facility]    Sig: Use every night    Class: E-Prescribe      Clinic-Administered Medications as of 11/18/2019       Dose Frequency Start End    lidocaine (PF) (XYLOCAINE) 1 % injection 300 mg 30 mL ONCE 1/4/2018     Route: Subcutaneous               Physical Exam:     BP (!) 150/87 (BP Location: Left arm, Patient Position: Chair, Cuff Size: Adult Regular)   Pulse 89   Ht 5' 4\" (162.6 cm)   Wt 165 lb 3.2 oz (74.9 kg)   SpO2 95%   BMI 28.36 kg/m      Physical Exam:   General: NAD  Head: Normocephalic, atraumatic  Eyes: No conjunctival injection, no scleral icterus.  Mouth: No oral lesions, no erythema or exudate in the oropharynx  Respiratory: No increased work of breathing  Cardiovascular: No lower extremity edema  Abdomen: Soft, non-tender, without organomegaly.  Extremities: Warm, dry  Neurologic:   Mental Status Exam: Alert, awake and easily engaged in interaction.   Cranial Nerves: PERRLA, EOMs intact, no nystagmus, facial movements symmetric mild weakness in eye closure,                 facial sensation intact to light touch, hearing intact to conversation, palate and uvula               rise symmetrically, no deviation in uvula or tongue, tongue midline and fully mobile                with no atrophy or fasciculations.    Motor: Normal tone in all four extremities, no atrophy or fasciculations.             Manual Motor Exam *     Right Left A F  Right Left A F   Shoulder Abduction 4 4   Hip Flexion 3.5 3.5     Elbow " Flexion 4 4   Knee Extension 4.5 4.5     Elbow Extension 4 4   Knee Flexion 4 4     Wrist Extension 4.5 4.5   Foot Dorsiflexion 4.5 4.5     Wrist flexion 5 5   Foot Plantar Flexion 5 5       * significant inpersistence, give away component.    Myasthenia Gravis Worksheet      Medications      Test item None Mild Moderate Severe Score   Grade 0 1 2 3    Diplopia (lateral gaze), R or L, s 60 11-59 1-10 spontaneous 3   Ptosis (upward gaze), s 60 11-59 1-10 spontaneous 0   Facial weakness   (Eyelid closure) Normal Some resistence No resistance Incomplete 1   Dysarthria with counting 1-50 >50 30-49 10-29 9 0   Swallowing 4 oz water Normal mild  Severe (regurgitation) Not able 0   Right arm held outstretched at 90 deg, s 240  10-89 0-9 2 (68)   Left arm held outstretched at 90 deg, s 240  10-89 0-9 2 (68)   Vital capacity, % predicted 80 65-79 50-64 <50    Right hand , kgW, M/F 45/30 15-44/10-29 5-14/5-9 0-4/0-4 1 (28)   Left hand , kgW, man/woman 35/25 15-34/10-24 5-14/5-9 0-4/0-4 1 (22)   Head lift 45 deg supine, s 120  1-29 0 2 (6 sec)   Right leg held outstretched at 45 deg supine, s 100 31-99 1-30 0 2 (25)   Left leg held outstretched at 45 deg supine, s 100 31-99 1-30 0 1 (36)     15/36                                                                                                                                 Reflexes   Right Left  Right Left   Biceps 2 2 Patellar 2 2   Triceps 2 2 Achilles 2 2   Brachioradialis 2 2 Babinski Absent Absent          Coordination: No overt dysmetria seen.    Reflexes: 2+ and symmetric in triceps, biceps, brachioradialis, patellar, Achilles.            Plantar responses flexor bilaterally   Gait:Use cane for stability.              Data:   CBC:  Lab Results   Component Value Date    WBC 8.7 08/06/2018     Lab Results   Component Value Date    RBC 4.64 08/06/2018     Lab Results   Component Value Date    HGB 14.1 08/06/2018     Lab Results   Component Value  Date    HCT 42.4 08/06/2018     No components found for: MCT  Lab Results   Component Value Date    MCV 91 08/06/2018     Lab Results   Component Value Date    MCH 30.4 08/06/2018     Lab Results   Component Value Date    MCHC 33.3 08/06/2018     Lab Results   Component Value Date    RDW 12.5 08/06/2018     Lab Results   Component Value Date     08/06/2018       Last Basic Metabolic Panel:  Lab Results   Component Value Date     07/09/2018      Lab Results   Component Value Date    POTASSIUM 4.1 07/09/2018     Lab Results   Component Value Date    CHLORIDE 108 07/09/2018     Lab Results   Component Value Date    LINDEN 9.1 07/09/2018     Lab Results   Component Value Date    CO2 26 07/09/2018     Lab Results   Component Value Date    BUN 25 07/09/2018     Lab Results   Component Value Date    CR 0.78 07/09/2018     Lab Results   Component Value Date    GLC 98 07/09/2018            Assessment and Recommendations:   Martina Kim is a 64 year old female presents with an 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  While she is stable her overall function is limited. 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 Later however is minor and has minimal symptomatic involvement.   I have discussed possibility of doing Soliris. If she decides she needs to have meningococcal vaccination.    Recommendations:  - Continue Prednisone 2.5 mg daily.   -Continue IVIG  Monthly.  -Return to clinic in 6 months or sooner.    I spent total of 30 minutes in face-to-face during today's visit. Over 50% of this time was spent counseling the patient and coordinating care. See note for details.     Frederick Vitale MD  [phone]       Patient Care Team:  Taylor Osborn as PCP - General  (Internal Medicine)  Frederick Vitale MD as MD (Neurology)  Sean Torres MD as MD (Cardiology)  Charito Marinelli MD as MD (Internal Medicine)  ASHWIN SUAREZ A    Copy to patient  IVY PETERSON  [address] [place] [address]

## 2019-11-19 NOTE — PROGRESS NOTES
[facility]                  Muscular Dystrophy Clinic Note     Martina Kim MRN# [id number]   YOB: 1955 Age: 64 year old      Date of Visit: Nov 18, 2019    Primary care provider: Taylor Osborn           History is obtained from the patient, family and medical record       Interval Change:      Martina Kim is a 64 year old female was seen and examined at the muscular dystrophy clinic on Nov 18, 2019 for evaluation of myasthenia gravis. She has been relatively stable. She stopped Cellcept in October due to side effects. This is her second attempt over the years. She had concerns regarding blood pressure increase and tachycardia. She started on hydrochlorotize. She is currently on Prednisone 2.5 mg. She was taking clonazepam for insomnia but discontinued it. She takes melatonin and 2 flexeril pills with some benefit. She is on IVIG every 2 weeks with 2 or 3 doses left. SHe reports no falls.  She continues to have fasciculations and now stopped taking carbamazepin with no significant change. She was on Azathioprine.   MG symptoms (bold reflects current status)  Symptoms     Comment   General    Normal Good Fair Poor    Diplopia   None Rare Frequent Constant    Ptosis   None Rare Frequent Constant    UE  weakness None Slightly limited Some ADL difficulties ADL limited    LE  weakness None Run/walk fatigue Short distance Minimal walking    Dysarthria   None Mild Moderate Severe    Voice   Normal Fade Soft Very quiet    Chew fatigue None Fatigue Fatigue soft food Tube feeding    Dysphagia   None Normal food Soft food Tube feeding    Dyspnea None Normal effort Any effort At rest/ Resp support             Myasthenia Gravisd Activities of Daily Living (MG-ADL)    Function None=0 Mild=1 Moderate=2 Severe=3 Score   Talking Normal Intermittent slurring or nasal speech Constant slurring or nasal speech, but can be understood Difficult to understand    Chewing  Normal Fatigue with solid food Fatigue with soft food Gastric tube    Swallowing Normal Rare episode of chocking Frequent chocking necessitating changes in diet Gastric tube    Breathing Normal Shortness of breath with exertion Shortness of breath at rest Ventilator dependence    Impairment of ability to brush teeth and comb hairs Normal Extra effort, but no rest periods needed Rest periods needed Cannot do one of these functions    Impairment of ability to arise from a chair Normal Mild, sometimes uses arms Moderate, always uses arms Severe, requires assistance    Double vision Normal Occurs but not daily Daily, but not constant Constant    Eyelid droop Normal Occurs but not daily Daily, but not constant Constant               Allergies:      Allergies   Allergen Reactions     Bee Venom Anaphylaxis     Contrast Dye Rash     Other reaction(s): Intolerance-Can't Take  Worsens MG symptoms  Worsens MG symptoms  Patient has myasthenia gravis from xray dye given for ct angio head     Fentanyl And Related Nausea and Vomiting     Intractable vomiting     Wasp Venom Protein Anaphylaxis     Codeine Nausea and Vomiting     Fosamax [Alendronic Acid]      Other reaction(s): GI Upset  GERD     Morphine Nausea and Vomiting     Morphine Hcl Nausea and Vomiting     Pt states she is allergic to ALL Narcotics.     Oxycodone Nausea and Vomiting     Percocet [Oxycodone-Acetaminophen] Nausea and Vomiting     Phenytoin Nausea and Vomiting     Other reaction(s): GI Upset             Medications:     Prescription Medications as of 11/18/2019       Rx Number Disp Refills Start End Last Dispensed Date Next Fill Date Owning Pharmacy    Ascorbic Acid (VITAMIN C PO)            Sig: Take 1,000 mg by mouth daily    Class: Historical    Route: Oral    aspirin 81 MG tablet            Sig: Take  by mouth daily. 1 Tablet Daily    Class: Historical    Route: Oral    bimatoprost (LUMIGAN) 0.01 % SOLN            Sig: Place 1 drop into both eyes daily.      Class: Historical    Route: Both Eyes    calcium carbonate (OS-LINDEN 500 MG [place]. [place]) 500 MG tablet            Sig: Take 500 mg by mouth 2 times daily    Class: Historical    Route: Oral    Cholecalciferol (VITAMIN D PO)            Sig: Take 5,000 Units by mouth daily 1 Tablet Daily    Class: Historical    Route: Oral    citalopram (CELEXA) 20 MG tablet  90 tablet 1 9/9/2019    [id number] - [place], [place] - [address] [place] [address]    Sig: TAKE ONE TABLET BY MOUTH ONCE DAILY    Class: E-Prescribe    cyclobenzaprine (FLEXERIL) 10 MG tablet  180 tablet 0 1/9/2019    [facility] [address] [place] [address] [id number]    Sig: TAKE ONE to TWO TABLETS BY MOUTH AT BEDTIME AS NEEDED FOR MUSCLE SPASM    Class: E-Prescribe    diphenhydrAMINE (BENADRYL) 50 MG capsule            Sig: Take 50 mg by mouth every 6 hours as needed Every 4 hours during infusion    Class: Historical    Route: Oral    doxycycline hyclate (VIBRAMYCIN) 50 MG capsule        [facility] [address] [place], [place] [id number]    Sig: Take 100 mg by mouth    Class: Historical    Route: Oral    IBUPROFEN PO            Sig: Take 800 mg by mouth every 8 hours as needed     Class: Historical    Route: Oral    Immune Globulin, Human, 10 GM/50ML SOLN  280 mL 3 9/25/2018    [facility] [address] [place] [address] [id number]    Sig: Inject 70 mLs (14 g) Subcutaneous once a week    Class: E-Prescribe    Route: Subcutaneous    LANsoprazole (PREVACID) 30 MG capsule            Sig: Take 30 mg by mouth daily 1 Tablet Daily    Class: Historical    Route: Oral    lisinopril (PRINIVIL/ZESTRIL) 20 MG tablet    6/28/2019        Sig: Take 20 mg by mouth    Class: Historical    Route: Oral    methylPREDNISolone sodium succinate (SOLU-MEDROL) 125 mg/2 mL injection            Sig: Inject 125 mg into the vein once    Class: Historical    Route: Intravenous    spironolactone (ALDACTONE) 100 MG tablet  5 tablet 2 6/4/2019    [facility] [address] [place] [address]   [id number]    Sig: TAKE ONE TABLET BY MOUTH ONCE DAILY BEFORE INFUSION    Class: E-Prescribe    Thiamine HCl (VITAMIN  B-1) 50 MG tablet            Sig: Take 50 mg by mouth daily    Class: Historical    Route: Oral    VITAMIN A PO            Sig: Take 8,000 Units by mouth daily 1 Tablet Daily    Class: Historical    Route: Oral    VITAMIN E            Si Units daily 1 Tablet Daily    Class: Historical    Route: Does not apply    AMLODIPINE BESYLATE PO            Sig: Take 2.5 mg by mouth daily     Class: Historical    Route: Oral    amLODIPine-benazepril (LOTREL) 2.5-10 MG per capsule  90 capsule 3 2013    [id number] - [place] - [address] [place] [address] [address] [facility]    Sig: Take 1 capsule by mouth daily    Class: E-Prescribe    Route: Oral    clonazePAM (KLONOPIN) 1 MG tablet  180 tablet 0 2019    [facility] [address] [place] [address] [id number]    Sig: Take 1-2 tablets at bedtime as needed for sleep    Class: Local Print    fluorometholone (FML LIQUIFILM) 0.1 % ophthalmic susp   1 10/4/2017        Sig: Apply 1 drop to eye 3 times daily    Class: Historical    Route: Ophthalmic    HEALON 10 MG/ML SOLN intra-ocular inj   3 2017        Sig: Apply 1 Application topically 4 times daily    Class: Historical    Route: Topical    Immune Globulin, Human, (HIZENTRA) 10 GM/50ML SOLN  4 vial 3 2019    [facility]    Sig: Inject 70 mLs (14 g) Subcutaneous once a week    Class: Local Print    Route: Subcutaneous    mycophenolate (GENERIC EQUIVALENT) 500 MG tablet  120 tablet 5 7/15/2019    [facility] [address] [place] [address] [id number]    Sig: Take 2 tablets (1,000 mg) by mouth 2 times daily    Class: E-Prescribe    Route: Oral    order for DME    2016        Sig: Respironics [place] AutoBiPAP 11/6 cm, ComfortGel Blue Pte nasal mask.    Class: Historical    predniSONE (DELTASONE) 5 MG tablet  180 tablet 1 2019     [facility] [place], [place] - [address] [facility]    Sig: TAKE TWO TABS, 10MG DAILY    Class: E-Prescribe    tretinoin (RETIN-A) 0.025 % external cream  45 g 3 9/6/2019    [facility] - [place], [place] - [address] [facility]    Sig: Use every night    Class: E-Prescribe      Clinic-Administered Medications as of 11/18/2019       Dose Frequency Start End    lidocaine (PF) (XYLOCAINE) 1 % injection 300 mg 30 mL ONCE 1/4/2018     Route: Subcutaneous                Review of Systems:   Answers for HPI/ROS submitted by the patient on 11/18/2019   General Symptoms: Yes  Skin Symptoms: No  HENT Symptoms: No  EYE SYMPTOMS: Yes  HEART SYMPTOMS: Yes  LUNG SYMPTOMS: Yes  INTESTINAL SYMPTOMS: Yes  URINARY SYMPTOMS: Yes  GYNECOLOGIC SYMPTOMS: No  BREAST SYMPTOMS: No  SKELETAL SYMPTOMS: Yes  BLOOD SYMPTOMS: Yes  NERVOUS SYSTEM SYMPTOMS: Yes  MENTAL HEALTH SYMPTOMS: Yes  Fever: No  Loss of appetite: Yes  Weight loss: Yes  Weight gain: No  Fatigue: Yes  Night sweats: Yes  Chills: No  Excessive thirst: No  Feeling hot or cold when others believe the temperature is normal: Yes  Loss of height: No  Post-operative complications: No  Surgical site pain: No  Hallucinations: No  Change in or Loss of Energy: Yes  Hyperactivity: No  Confusion: No  Eye pain: No  Vision loss: No  Dry eyes: Yes  Watery eyes: No  Eye bulging: No  Double vision: No  Flashing of lights: No  Sputum or phlegm: No  Coughing up blood: No  Difficulty breating or shortness of breath: Yes  Snoring: No  Wheezing: No  Difficulty breathing on exertion: Yes  Nighttime Cough: No  Difficulty breathing when lying flat: No  Chest pain or pressure: Yes  Fast or irregular heartbeat: Yes  Pain in legs with walking: No  Trouble breathing while lying down: No  Fingers or toes appear blue: No  High blood pressure: Yes  Low blood pressure: Yes  Fainting: No  Murmurs: Yes  Pacemaker: No  Varicose veins: No  Edema or swelling: No  Wake up at night with shortness of breath:  "No  Light-headedness: Yes  Heart burn or indigestion: No  Nausea: No  Vomiting: No  Abdominal pain: No  Bloating: No  Constipation: No  Diarrhea: No  Trouble holding urine or incontinence: Yes  Pain or burning: No  Trouble starting or stopping: No  Increased frequency of urination: No  Blood in urine: No  Decreased frequency of urination: No  Frequent nighttime urination: Yes  Back pain: Yes  Muscle aches: Yes  Neck pain: Yes  Swollen joints: No  Joint pain: Yes  Bone pain: No  Muscle cramps: Yes  Anemia: No  Swollen glands: No  Easy bleeding or bruising: Yes  Trouble with coordination: Yes  Dizziness or trouble with balance: Yes  Fainting or black-out spells: No  Memory loss: No  Headache: No  Seizures: No  Speech problems: No  Nervous or Anxious: Yes  Depression: Yes  Trouble sleeping: Yes  Trouble thinking or concentrating: Yes  Mood changes: Yes  Panic attacks: No         Physical Exam:     BP (!) 150/87 (BP Location: Left arm, Patient Position: Chair, Cuff Size: Adult Regular)   Pulse 89   Ht 5' 4\" (162.6 cm)   Wt 165 lb 3.2 oz (74.9 kg)   SpO2 95%   BMI 28.36 kg/m     Physical Exam:   General: NAD  Head: Normocephalic, atraumatic  Eyes: No conjunctival injection, no scleral icterus.  Mouth: No oral lesions, no erythema or exudate in the oropharynx  Respiratory: No increased work of breathing  Cardiovascular: No lower extremity edema  Abdomen: Soft, non-tender, without organomegaly.  Extremities: Warm, dry  Neurologic:   Mental Status Exam: Alert, awake and easily engaged in interaction.   Cranial Nerves: PERRLA, EOMs intact, no nystagmus, facial movements symmetric mild weakness in eye closure,                 facial sensation intact to light touch, hearing intact to conversation, palate and uvula               rise symmetrically, no deviation in uvula or tongue, tongue midline and fully mobile                with no atrophy or fasciculations.    Motor: Normal tone in all four extremities, no atrophy or " fasciculations.             Manual Motor Exam *     Right Left A F  Right Left A F   Shoulder Abduction 4 4   Hip Flexion 3.5 3.5     Elbow Flexion 4 4   Knee Extension 4.5 4.5     Elbow Extension 4 4   Knee Flexion 4 4     Wrist Extension 4.5 4.5   Foot Dorsiflexion 4.5 4.5     Wrist flexion 5 5   Foot Plantar Flexion 5 5       * significant inpersistence, give away component.    Myasthenia Gravis Worksheet      Medications      Test item None Mild Moderate Severe Score   Grade 0 1 2 3    Diplopia (lateral gaze), R or L, s 60 11-59 1-10 spontaneous 3   Ptosis (upward gaze), s 60 11-59 1-10 spontaneous 0   Facial weakness   (Eyelid closure) Normal Some resistence No resistance Incomplete 1   Dysarthria with counting 1-50 >50 30-49 10-29 9 0   Swallowing 4 oz water Normal mild  Severe (regurgitation) Not able 0   Right arm held outstretched at 90 deg, s 240  10-89 0-9 2 (68)   Left arm held outstretched at 90 deg, s 240  10-89 0-9 2 (68)   Vital capacity, % predicted 80 65-79 50-64 <50    Right hand , kgW, M/F 45/30 15-44/10-29 5-14/5-9 0-4/0-4 1 (28)   Left hand , kgW, man/woman 35/25 15-34/10-24 5-14/5-9 0-4/0-4 1 (22)   Head lift 45 deg supine, s 120  1-29 0 2 (6 sec)   Right leg held outstretched at 45 deg supine, s 100 31-99 1-30 0 2 (25)   Left leg held outstretched at 45 deg supine, s 100 31-99 1-30 0 1 (36)     15/36                                                                                                                                 Reflexes   Right Left  Right Left   Biceps 2 2 Patellar 2 2   Triceps 2 2 Achilles 2 2   Brachioradialis 2 2 Babinski Absent Absent          Coordination: No overt dysmetria seen.    Reflexes: 2+ and symmetric in triceps, biceps, brachioradialis, patellar, Achilles.            Plantar responses flexor bilaterally   Gait:Use cane for stability.              Data:   CBC:  Lab Results   Component Value Date    WBC 8.7 08/06/2018     Lab Results    Component Value Date    RBC 4.64 08/06/2018     Lab Results   Component Value Date    HGB 14.1 08/06/2018     Lab Results   Component Value Date    HCT 42.4 08/06/2018     No components found for: MCT  Lab Results   Component Value Date    MCV 91 08/06/2018     Lab Results   Component Value Date    MCH 30.4 08/06/2018     Lab Results   Component Value Date    MCHC 33.3 08/06/2018     Lab Results   Component Value Date    RDW 12.5 08/06/2018     Lab Results   Component Value Date     08/06/2018       Last Basic Metabolic Panel:  Lab Results   Component Value Date     07/09/2018      Lab Results   Component Value Date    POTASSIUM 4.1 07/09/2018     Lab Results   Component Value Date    CHLORIDE 108 07/09/2018     Lab Results   Component Value Date    LINDEN 9.1 07/09/2018     Lab Results   Component Value Date    CO2 26 07/09/2018     Lab Results   Component Value Date    BUN 25 07/09/2018     Lab Results   Component Value Date    CR 0.78 07/09/2018     Lab Results   Component Value Date    GLC 98 07/09/2018            Assessment and Recommendations:   Martina Kim is a 64 year old female presents with an 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  While she is stable her overall function is limited. 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 Later however is minor and has minimal symptomatic involvement.   I have discussed possibility of doing Soliris. If she decides she needs to have meningococcal vaccination.    Recommendations:  - Continue Prednisone 2.5 mg daily.   -Continue IVIG  Monthly.  -Return to clinic in 6 months or sooner.    I spent total of 30 minutes in face-to-face during today's visit. Over 50% of this time was spent counseling the  patient and coordinating care. See note for details.     Frederick Vitale MD  [phone]       CC  Patient Care Team:  Taylor Osborn as PCP - General (Internal Medicine)  Frederick Vitale MD as MD (Neurology)  Sean Torres MD as MD (Cardiology)  Charito Marinelli MD as MD (Internal Medicine)  ASHWIN SUAREZ A    Copy to patient  [facility] [place] [address]

## 2019-12-09 ENCOUNTER — PRE VISIT (OUTPATIENT)
Dept: CARDIOLOGY | Facility: CLINIC | Age: 64
End: 2019-12-09

## 2019-12-09 ENCOUNTER — OFFICE VISIT (OUTPATIENT)
Dept: CARDIOLOGY | Facility: CLINIC | Age: 64
End: 2019-12-09
Attending: INTERNAL MEDICINE
Payer: MEDICARE

## 2019-12-09 VITALS
WEIGHT: 167.9 LBS | BODY MASS INDEX: 28.66 KG/M2 | SYSTOLIC BLOOD PRESSURE: 152 MMHG | HEART RATE: 71 BPM | DIASTOLIC BLOOD PRESSURE: 93 MMHG | HEIGHT: 64 IN | OXYGEN SATURATION: 95 %

## 2019-12-09 DIAGNOSIS — I10 ESSENTIAL HYPERTENSION: ICD-10-CM

## 2019-12-09 DIAGNOSIS — G70.01 MG WITH EXACERBATION (MYASTHENIA GRAVIS) (H): ICD-10-CM

## 2019-12-09 DIAGNOSIS — R06.02 SOB (SHORTNESS OF BREATH): Primary | ICD-10-CM

## 2019-12-09 DIAGNOSIS — I20.89 OTHER FORMS OF ANGINA PECTORIS (H): ICD-10-CM

## 2019-12-09 DIAGNOSIS — R00.2 PALPITATIONS: ICD-10-CM

## 2019-12-09 DIAGNOSIS — R01.1 SYSTOLIC MURMUR: ICD-10-CM

## 2019-12-09 PROCEDURE — 93005 ELECTROCARDIOGRAM TRACING: CPT | Mod: ZF

## 2019-12-09 PROCEDURE — G0463 HOSPITAL OUTPT CLINIC VISIT: HCPCS | Mod: 25,ZF

## 2019-12-09 PROCEDURE — 99203 OFFICE O/P NEW LOW 30 MIN: CPT | Mod: ZP | Performed by: INTERNAL MEDICINE

## 2019-12-09 RX ORDER — AMLODIPINE BESYLATE 5 MG/1
5 TABLET ORAL AT BEDTIME
Qty: 90 TABLET | Refills: 3 | Status: SHIPPED | OUTPATIENT
Start: 2019-12-09 | End: 2021-02-08

## 2019-12-09 ASSESSMENT — ENCOUNTER SYMPTOMS
LIGHT-HEADEDNESS: 1
MEMORY LOSS: 0
EYE PAIN: 0
DECREASED CONCENTRATION: 0
PARALYSIS: 0
LOSS OF CONSCIOUSNESS: 0
DOUBLE VISION: 1
NIGHT SWEATS: 1
MUSCLE CRAMPS: 1
LEG PAIN: 0
WEIGHT LOSS: 1
DIARRHEA: 0
NUMBNESS: 0
SPUTUM PRODUCTION: 0
DISTURBANCES IN COORDINATION: 1
SNORES LOUDLY: 0
DIFFICULTY URINATING: 0
COUGH: 0
TREMORS: 0
DYSPNEA ON EXERTION: 1
PALPITATIONS: 1
ARTHRALGIAS: 1
WEIGHT GAIN: 0
BOWEL INCONTINENCE: 0
POLYDIPSIA: 0
FEVER: 0
BLOOD IN STOOL: 0
HEADACHES: 0
POLYPHAGIA: 1
WEAKNESS: 1
MUSCLE WEAKNESS: 1
BACK PAIN: 1
EYE REDNESS: 1
ABDOMINAL PAIN: 0
BLOATING: 0
DECREASED APPETITE: 0
POSTURAL DYSPNEA: 0
DEPRESSION: 1
SYNCOPE: 0
COUGH DISTURBING SLEEP: 0
ALTERED TEMPERATURE REGULATION: 1
SPEECH CHANGE: 0
DYSURIA: 0
INSOMNIA: 0
JOINT SWELLING: 0
SLEEP DISTURBANCES DUE TO BREATHING: 0
EYE WATERING: 0
MYALGIAS: 1
FLANK PAIN: 0
NERVOUS/ANXIOUS: 0
HEARTBURN: 0
EXERCISE INTOLERANCE: 1
PANIC: 0
HALLUCINATIONS: 0
WHEEZING: 0
SEIZURES: 0
STIFFNESS: 0
ORTHOPNEA: 0
DIZZINESS: 1
CONSTIPATION: 0
CHILLS: 0
NECK PAIN: 1
TINGLING: 0
HYPOTENSION: 0
INCREASED ENERGY: 1
FATIGUE: 1
SWOLLEN GLANDS: 0
RECTAL PAIN: 0
EYE IRRITATION: 1
HEMATURIA: 0
HEMOPTYSIS: 0
SHORTNESS OF BREATH: 1
NAUSEA: 0
JAUNDICE: 0
VOMITING: 0
HYPERTENSION: 1
BRUISES/BLEEDS EASILY: 1

## 2019-12-09 ASSESSMENT — MIFFLIN-ST. JEOR: SCORE: 1286.59

## 2019-12-09 ASSESSMENT — PAIN SCALES - GENERAL: PAINLEVEL: MILD PAIN (2)

## 2019-12-09 NOTE — LETTER
12/9/2019      RE: Martina Kim  [address] [place] [address]       Dear Colleague,    Thank you for the opportunity to participate in the care of your patient, Martina Kim, at the [facility] at [facility]. Please see a copy of my visit note below.    HPI:     I had the privilege to evaluate Mrs KASIA Kim, who is 64 yr old female patient with a PMH most remarkable for myasthenia gravis who is a former patient who presents for follow up due to the following issues:    1. Hypertension. Patient notes she has been controlled well on her current regimen for many years, but recently has become more hypertensive. Her PCP added hydrochlorothiazide and she became hypotensive. She discontinued this and would like help adjusting her medications.    2. Tachycardia. Notes that she has 30-40 seconds of tachycardia several times per week. Associated with some light-headedness but no syncope or pre-syncope. Has been happening since August.     3. Chest Pain. Notes substernal chest pain spreading to the right shoulder a few times per week. This has occurred since August and typically lasts a few minutes. Does not get much activity due to medical co-morbidities, but it seems to happen when she is resting.    4. Murmur. PCP noted new murmur.    PAST MEDICAL HISTORY:  Past Medical History:   Diagnosis Date     Gastro-oesophageal reflux disease      Hypertension      Myasthenia gravis (H)      Myasthenia gravis (H)      Scoliosis, congenital        CURRENT MEDICATIONS:  Current Outpatient Medications   Medication Sig Dispense Refill     amLODIPine (NORVASC) 5 MG tablet Take 1 tablet (5 mg) by mouth At Bedtime 90 tablet 3     Ascorbic Acid (VITAMIN C PO) Take 1,000 mg by mouth daily       aspirin 81 MG tablet Take  by mouth daily. 1 Tablet Daily       bimatoprost (LUMIGAN) 0.01 % SOLN Place 1 drop into both eyes daily.        calcium carbonate (OS-LINDEN 500  MG [place]. [place]) 500 MG tablet Take 500 mg by mouth 2 times daily       Cholecalciferol (VITAMIN D PO) Take 5,000 Units by mouth daily 1 Tablet Daily       citalopram (CELEXA) 20 MG tablet TAKE ONE TABLET BY MOUTH ONCE DAILY 90 tablet 1     cyclobenzaprine (FLEXERIL) 10 MG tablet TAKE ONE to TWO TABLETS BY MOUTH AT BEDTIME AS NEEDED FOR MUSCLE SPASM 180 tablet 0     diphenhydrAMINE (BENADRYL) 50 MG capsule Take 50 mg by mouth every 6 hours as needed Every 4 hours during infusion       doxycycline hyclate (VIBRAMYCIN) 50 MG capsule Take 100 mg by mouth       fluorometholone (FML LIQUIFILM) 0.1 % ophthalmic susp Apply 1 drop to eye 3 times daily  1     IBUPROFEN PO Take 800 mg by mouth every 8 hours as needed        Immune Globulin, Human, 10 GM/50ML SOLN Inject 70 mLs (14 g) Subcutaneous once a week 280 mL 3     LANsoprazole (PREVACID) 30 MG capsule Take 30 mg by mouth daily 1 Tablet Daily       lisinopril (PRINIVIL/ZESTRIL) 20 MG tablet Take 20 mg by mouth       methylPREDNISolone sodium succinate (SOLU-MEDROL) 125 mg/2 mL injection Inject 125 mg into the vein once       [facility] AutoBiPAP 11/6 cm, ComfortGel Blue Pte nasal mask.       predniSONE (DELTASONE) 5 MG tablet TAKE TWO TABS, 10MG DAILY (Patient taking differently: 2.5 mg daily ) 180 tablet 1     spironolactone (ALDACTONE) 100 MG tablet TAKE ONE TABLET BY MOUTH ONCE DAILY BEFORE INFUSION 5 tablet 2     Thiamine HCl (VITAMIN  B-1) 50 MG tablet Take 50 mg by mouth daily       VITAMIN A PO Take 8,000 Units by mouth daily 1 Tablet Daily       VITAMIN E 400 Units daily 1 Tablet Daily       HEALON 10 MG/ML SOLN intra-ocular inj Apply 1 Application topically 4 times daily  3     Immune Globulin, Human, (HIZENTRA) 10 GM/50ML SOLN Inject 70 mLs (14 g) Subcutaneous once a week (Patient not taking: Reported on 11/18/2019) 4 vial 3     tretinoin (RETIN-A) 0.025 % external cream Use every night (Patient not taking: Reported on 11/18/2019) 45 g  3       PAST SURGICAL HISTORY:  Past Surgical History:   Procedure Laterality Date     APPENDECTOMY       BIOPSY       BREAST SURGERY       ENT SURGERY       GYN SURGERY       INSERT CATHETER VASCULAR ACCESS Right 12/20/2017    Procedure: INSERT CATHETER VASCULAR ACCESS;  Tunneled central venous catheter placement;  Surgeon: Alex Montes PA-C;  Location: [facility] OR     PHACOEMULSIFICATION CLEAR CORNEA WITH STANDARD INTRAOCULAR LENS IMPLANT Left 4/8/2015    Procedure: PHACOEMULSIFICATION CLEAR CORNEA WITH STANDARD INTRAOCULAR LENS IMPLANT;  Surgeon: Perfecto Arrieta MD;  Location: [facility]       ALLERGIES     Allergies   Allergen Reactions     Bee Venom Anaphylaxis     Contrast Dye Rash     Other reaction(s): Intolerance-Can't Take  Worsens MG symptoms  Worsens MG symptoms  Patient has myasthenia gravis from xray dye given for ct angio head     Fentanyl And Related Nausea and Vomiting     Intractable vomiting     Wasp Venom Protein Anaphylaxis     Codeine Nausea and Vomiting     Fosamax [Alendronic Acid]      Other reaction(s): GI Upset  GERD     Morphine Nausea and Vomiting     Morphine Hcl Nausea and Vomiting     Pt states she is allergic to ALL Narcotics.     Oxycodone Nausea and Vomiting     Percocet [Oxycodone-Acetaminophen] Nausea and Vomiting     Phenytoin Nausea and Vomiting     Other reaction(s): GI Upset       FAMILY HISTORY:  Family History   Problem Relation Age of Onset     C.A.D. Mother      Alzheimer Disease Father        SOCIAL HISTORY:  Social History     Socioeconomic History     Marital status:      Spouse name: None     Number of children: None     Years of education: None     Highest education level: None   Occupational History     None   Social Needs     Financial resource strain: None     Food insecurity:     Worry: None     Inability: None     Transportation needs:     Medical: None     Non-medical: None   Tobacco Use     Smoking status: Former Smoker     Types: Cigarettes     Last  "attempt to quit: 10/27/1996     Years since quittin.1     Smokeless tobacco: Former User   Substance and Sexual Activity     Alcohol use: Yes     Alcohol/week: 5.0 standard drinks     Types: 1 Glasses of wine, 2 Cans of beer, 2 Standard drinks or equivalent per week     Comment: WEEKLY     Drug use: No     Sexual activity: Yes     Partners: Male   Lifestyle     Physical activity:     Days per week: None     Minutes per session: None     Stress: None   Relationships     Social connections:     Talks on phone: None     Gets together: None     Attends Taoism service: None     Active member of club or organization: None     Attends meetings of clubs or organizations: None     Relationship status: None     Intimate partner violence:     Fear of current or ex partner: None     Emotionally abused: None     Physically abused: None     Forced sexual activity: None   Other Topics Concern     Parent/sibling w/ CABG, MI or angioplasty before 65F 55M? Not Asked   Social History Narrative     None       ROS:   Constitutional: No fever, chills, or sweats. No weight gain/loss   ENT: No visual disturbance, ear ache, epistaxis, sore throat  Allergies/Immunologic: Negative.   Respiratory: No cough, hemoptysia  Cardiovascular: As per HPI  GI: No nausea, vomiting, hematemesis, melena, or hematochezia  : No urinary frequency, dysuria, or hematuria  Integument: Negative  Psychiatric: Negative  Neuro: Negative  Endocrinology: Negative   Musculoskeletal: Negative    EXAM:  BP (!) 152/93 (BP Location: Left arm, Patient Position: Chair, Cuff Size: Adult Regular)   Pulse 71   Ht 1.626 m (5' 4\")   Wt 76.2 kg (167 lb 14.4 oz)   SpO2 95%   BMI 28.82 kg/m     In general, the patient is a pleasant female in no apparent distress.    HEENT: NC/AT.  PERRLA.  EOMI.  Sclerae white, not injected.  Nares clear.  Pharynx without erythema or exudate.  Dentition intact.    Neck: No adenopathy.  No thyromegaly. Carotids +4/4 bilaterally without " bruits.  No jugular venous distension.   Heart: RRR. 4/6 systolic murmur. No rub, click, or gallop. The PMI is in the 5th ICS in the midclavicular line. There is no heave.    Lungs: CTA.  No ronchi, wheezes, rales.  No dullness to percussion.   Abdomen: Soft, nontender, nondistended. No organomegaly.  No bruits.   Extremities: No clubbing, cyanosis, or edema.  The pulses are +4/4 at the radial, brachial, femoral, popliteal, DP, and PT sites bilaterally.  No bruits are noted.  Neurologic: Alert and oriented to person/place/time, normal speech, gait and affect  Skin: No petechiae, purpura or rash.    Labs:  LIPID RESULTS:  Lab Results   Component Value Date    CHOL 242 (H) 11/22/2013    HDL 61 11/22/2013     (H) 11/22/2013    TRIG 156 (H) 11/22/2013    CHOLHDLRATIO 3.9 11/22/2013   Lipids 06-28-19   Ref Range & Units Value   Cholesterol 114 - 200 mg/dL 216High     HDL Cholesterol 40 - 60 mg/dL 67High     Triglycerides 10 - 200 mg/dL 149    LDL Cholesterol, Calculated  mg/dL 119      Thyroid Stimulating Hormone 0.40 - 3.99 uIU/mL 1.49      WBC 3.2 - 11.0 10*9/L 4.6    RBC 3.77 - 5.24 10*12/L 4.14    HGB 11.2 - 15.5 g/dL 12.6    HCT 34.3 - 46.0 % 38.5    MCV 81.4 - 99.0 fL 93.0    MCH 26.7 - 33.1 pg 30.4    MCHC 31.6 - 35.5 g/dL 32.7    RDW 11.3 - 14.6 % 14.4     - 375 10*9/L 191    Resulting Agency           LIVER ENZYME RESULTS:  Lab Results   Component Value Date    AST 15 08/06/2018    ALT 28 08/06/2018       CBC RESULTS:  Lab Results   Component Value Date    WBC 8.7 08/06/2018    RBC 4.64 08/06/2018    HGB 14.1 08/06/2018    HCT 42.4 08/06/2018    MCV 91 08/06/2018    MCH 30.4 08/06/2018    MCHC 33.3 08/06/2018    RDW 12.5 08/06/2018     08/06/2018       BMP RESULTS:  Lab Results   Component Value Date     07/09/2018    POTASSIUM 4.1 07/09/2018    CHLORIDE 108 07/09/2018    CO2 26 07/09/2018    ANIONGAP 8 07/09/2018    GLC 98 07/09/2018    BUN 25 07/09/2018    CR 0.78 07/09/2018     GFRESTIMATED 74 07/09/2018    GFRESTBLACK 90 07/09/2018    LINDEN 9.1 07/09/2018        INR RESULTS:  Lab Results   Component Value Date    INR 1.06 07/18/2018    INR 1.06 07/16/2018       Procedures:    Reviewed EKG, which was notable for sinus rhythm--no ischemic changes.    Assessment and Plan:   We discussed the results with patient.  We discussed the importance of a heart healthy diet and lifestyle.  Patient is a 64 year old female with a PMH most remarkable for myasthenia gravis who is a former patient who presents for follow up due to the following issues:    1. Hypertension. Will increased patient's amlodipine from 2.5 mg to 5 mg daily. Continue with 20 mg of lisinopril.    2. Tachycardia. Concerning for atrial fibrillation or SVT. Will do 2 week ZioPatch.    3. Chest Pain. Atypical chest pain, will get CT-coronary angiogram.    4. Murmur. Probably aortic stenosis, will obtain echocardiogram.    Once we have all the results of the different tests, we will discuss therapeutic changes in function of the results.    Sean Torres MD, PhD  Professor of Medicine  Division of Cardiology      CC  Patient Care Team:  Taylor Osborn as PCP - General (Internal Medicine)  Frederick Vitale MD as MD (Neurology)  Sean Torres MD as MD (Cardiology)  Charito Marinelli MD as MD (Internal Medicine)  SELF, REFERRED

## 2019-12-10 LAB
EXPTIME-PRE: 5.71 SEC
FEF2575-%PRED-PRE: 108 %
FEF2575-PRE: 2.19 L/SEC
FEF2575-PRED: 2.02 L/SEC
FEFMAX-%PRED-PRE: 86 %
FEFMAX-PRE: 5.13 L/SEC
FEFMAX-PRED: 5.93 L/SEC
FEV1-%PRED-PRE: 64 %
FEV1-PRE: 1.51 L
FEV1FEV6-PRE: 89 %
FEV1FEV6-PRED: 80 %
FEV1FVC-PRE: 89 %
FEV1FVC-PRED: 79 %
FIFMAX-PRE: 3.8 L/SEC
FVC-%PRED-PRE: 57 %
FVC-PRE: 1.7 L
FVC-PRED: 2.98 L
MEP-PRE: 103 CMH2O
MIP-PRE: -71 CMH2O

## 2019-12-10 NOTE — PATIENT INSTRUCTIONS
Patient Instructions:  It was a pleasure to see you in the cardiology clinic today.      If you have any questions, you can reach my nurse, Gely CARTWRIGHT LPN, at [phone].  Press Option #1 for the [facility], and then press Option #4 for nursing.    We are encouraging the use of [employer] to communicate with your HealthCare Provider    Medication Changes: Increase Amlodipine to 5 mg every evening.    Recommendations: Check with your physician about the possibility of completing an CT with iodine based contract.    Studies Ordered: Scheduling telephone is [phone].    - Echocardiogram  - CT Coronary Angiogram  - 14 day Cardiac Event Monitor    CT Coronary Angiogram  Computed tomography angiography (CTA) is an imaging test. It uses X-rays and computer technology to make detailed pictures of your arteries. Before the test, an X-ray dye (contrast medium) is shot or injected into your vein. The dye makes it easier to see your blood vessels on the X-ray. Pictures are then taken with the CT scanner. A computer turns the CT images into 2- and 3-dimensional pictures. A CT Coronary Angiogram looks at the arteries in your heart.     You are scheduled for a CT Coronary Angiogram at the [facility] [place] at [address], [place], [address].   Please report to the [facility] Waiting Room in the [facility].    Follow these instructions:  1. For 2 days prior to the test, the day of the test and for at least 2 days after the test, drink extra water (unless you are on a fluid restriction from your doctor).  This will make it easier on your kidneys to process the contrast.  2. Nothing to eat or drink except water 3 hours prior.  3. No caffeine, smoking, or strenuous activity before test.    4. You will be receiving contrast. You will need pre-treatment if you have allergies to contrast dye or shellfish.   5. HOLD these medications:   3. NSAIDS (ibuprofen, naproxen)  day of.   8. Testing takes about 15 min. Please allow 2 hours for test as you may need medications to slow your heart rate for the test.      The results from today include: None.    Please follow up: With Dr. Torres based on results of testing.    Sincerely,    Sean Torres MD     If you have an urgent need after hours (8:00 am to 4:30 pm) please call [phone] and ask for the cardiology fellow on call.

## 2019-12-10 NOTE — PROGRESS NOTES
HPI:     I had the privilege to evaluate Mrs KASIA Kim, who is 64 yr old female patient with a PMH most remarkable for myasthenia gravis who is a former patient who presents for follow up due to the following issues:    1. Hypertension. Patient notes she has been controlled well on her current regimen for many years, but recently has become more hypertensive. Her PCP added hydrochlorothiazide and she became hypotensive. She discontinued this and would like help adjusting her medications.    2. Tachycardia. Notes that she has 30-40 seconds of tachycardia several times per week. Associated with some light-headedness but no syncope or pre-syncope. Has been happening since August.     3. Chest Pain. Notes substernal chest pain spreading to the right shoulder a few times per week. This has occurred since August and typically lasts a few minutes. Does not get much activity due to medical co-morbidities, but it seems to happen when she is resting.    4. Murmur. PCP noted new murmur.    PAST MEDICAL HISTORY:  Past Medical History:   Diagnosis Date     Gastro-oesophageal reflux disease      Hypertension      Myasthenia gravis (H)      Myasthenia gravis (H)      Scoliosis, congenital        CURRENT MEDICATIONS:  Current Outpatient Medications   Medication Sig Dispense Refill     amLODIPine (NORVASC) 5 MG tablet Take 1 tablet (5 mg) by mouth At Bedtime 90 tablet 3     Ascorbic Acid (VITAMIN C PO) Take 1,000 mg by mouth daily       aspirin 81 MG tablet Take  by mouth daily. 1 Tablet Daily       bimatoprost (LUMIGAN) 0.01 % SOLN Place 1 drop into both eyes daily.        calcium carbonate (OS-LINDEN 500 MG [place]. [place]) 500 MG tablet Take 500 mg by mouth 2 times daily       Cholecalciferol (VITAMIN D PO) Take 5,000 Units by mouth daily 1 Tablet Daily       citalopram (CELEXA) 20 MG tablet TAKE ONE TABLET BY MOUTH ONCE DAILY 90 tablet 1     cyclobenzaprine (FLEXERIL) 10 MG tablet TAKE ONE to TWO TABLETS BY MOUTH AT BEDTIME AS  NEEDED FOR MUSCLE SPASM 180 tablet 0     diphenhydrAMINE (BENADRYL) 50 MG capsule Take 50 mg by mouth every 6 hours as needed Every 4 hours during infusion       doxycycline hyclate (VIBRAMYCIN) 50 MG capsule Take 100 mg by mouth       fluorometholone (FML LIQUIFILM) 0.1 % ophthalmic susp Apply 1 drop to eye 3 times daily  1     IBUPROFEN PO Take 800 mg by mouth every 8 hours as needed        Immune Globulin, Human, 10 GM/50ML SOLN Inject 70 mLs (14 g) Subcutaneous once a week 280 mL 3     LANsoprazole (PREVACID) 30 MG capsule Take 30 mg by mouth daily 1 Tablet Daily       lisinopril (PRINIVIL/ZESTRIL) 20 MG tablet Take 20 mg by mouth       methylPREDNISolone sodium succinate (SOLU-MEDROL) 125 mg/2 mL injection Inject 125 mg into the vein once       order for [facility] RespirFixstars Dream Station AutoBiPAP 11/6 cm, ComfortGel Blue Pte nasal mask.       predniSONE (DELTASONE) 5 MG tablet TAKE TWO TABS, 10MG DAILY (Patient taking differently: 2.5 mg daily ) 180 tablet 1     spironolactone (ALDACTONE) 100 MG tablet TAKE ONE TABLET BY MOUTH ONCE DAILY BEFORE INFUSION 5 tablet 2     Thiamine HCl (VITAMIN  B-1) 50 MG tablet Take 50 mg by mouth daily       VITAMIN A PO Take 8,000 Units by mouth daily 1 Tablet Daily       VITAMIN E 400 Units daily 1 Tablet Daily       HEALON 10 MG/ML SOLN intra-ocular inj Apply 1 Application topically 4 times daily  3     Immune Globulin, Human, (HIZENTRA) 10 GM/50ML SOLN Inject 70 mLs (14 g) Subcutaneous once a week (Patient not taking: Reported on 11/18/2019) 4 vial 3     tretinoin (RETIN-A) 0.025 % external cream Use every night (Patient not taking: Reported on 11/18/2019) 45 g 3       PAST SURGICAL HISTORY:  Past Surgical History:   Procedure Laterality Date     APPENDECTOMY       BIOPSY       BREAST SURGERY       ENT SURGERY       GYN SURGERY       INSERT CATHETER VASCULAR ACCESS Right 12/20/2017    Procedure: INSERT CATHETER VASCULAR ACCESS;  Tunneled central venous catheter placement;   Surgeon: Alex Montes PA-C;  Location: [facility] OR     PHACOEMULSIFICATION CLEAR CORNEA WITH STANDARD INTRAOCULAR LENS IMPLANT Left 2015    Procedure: PHACOEMULSIFICATION CLEAR CORNEA WITH STANDARD INTRAOCULAR LENS IMPLANT;  Surgeon: Perfecto Arrieta MD;  Location: [facility]       ALLERGIES     Allergies   Allergen Reactions     Bee Venom Anaphylaxis     Contrast Dye Rash     Other reaction(s): Intolerance-Can't Take  Worsens MG symptoms  Worsens MG symptoms  Patient has myasthenia gravis from xray dye given for ct angio head     Fentanyl And Related Nausea and Vomiting     Intractable vomiting     Wasp Venom Protein Anaphylaxis     Codeine Nausea and Vomiting     Fosamax [Alendronic Acid]      Other reaction(s): GI Upset  GERD     Morphine Nausea and Vomiting     Morphine Hcl Nausea and Vomiting     Pt states she is allergic to ALL Narcotics.     Oxycodone Nausea and Vomiting     Percocet [Oxycodone-Acetaminophen] Nausea and Vomiting     Phenytoin Nausea and Vomiting     Other reaction(s): GI Upset       FAMILY HISTORY:  Family History   Problem Relation Age of Onset     C.A.D. Mother      Alzheimer Disease Father        SOCIAL HISTORY:  Social History     Socioeconomic History     Marital status:      Spouse name: None     Number of children: None     Years of education: None     Highest education level: None   Occupational History     None   Social Needs     Financial resource strain: None     Food insecurity:     Worry: None     Inability: None     Transportation needs:     Medical: None     Non-medical: None   Tobacco Use     Smoking status: Former Smoker     Types: Cigarettes     Last attempt to quit: 10/27/1996     Years since quittin.1     Smokeless tobacco: Former User   Substance and Sexual Activity     Alcohol use: Yes     Alcohol/week: 5.0 standard drinks     Types: 1 Glasses of wine, 2 Cans of beer, 2 Standard drinks or equivalent per week     Comment: WEEKLY     Drug use: No     Sexual  "activity: Yes     Partners: Male   Lifestyle     Physical activity:     Days per week: None     Minutes per session: None     Stress: None   Relationships     Social connections:     Talks on phone: None     Gets together: None     Attends Faith service: None     Active member of club or organization: None     Attends meetings of clubs or organizations: None     Relationship status: None     Intimate partner violence:     Fear of current or ex partner: None     Emotionally abused: None     Physically abused: None     Forced sexual activity: None   Other Topics Concern     Parent/sibling w/ CABG, MI or angioplasty before 65F 55M? Not Asked   Social History Narrative     None       ROS:   Constitutional: No fever, chills, or sweats. No weight gain/loss   ENT: No visual disturbance, ear ache, epistaxis, sore throat  Allergies/Immunologic: Negative.   Respiratory: No cough, hemoptysia  Cardiovascular: As per HPI  GI: No nausea, vomiting, hematemesis, melena, or hematochezia  : No urinary frequency, dysuria, or hematuria  Integument: Negative  Psychiatric: Negative  Neuro: Negative  Endocrinology: Negative   Musculoskeletal: Negative    EXAM:  BP (!) 152/93 (BP Location: Left arm, Patient Position: Chair, Cuff Size: Adult Regular)   Pulse 71   Ht 1.626 m (5' 4\")   Wt 76.2 kg (167 lb 14.4 oz)   SpO2 95%   BMI 28.82 kg/m    In general, the patient is a pleasant female in no apparent distress.    HEENT: NC/AT.  PERRLA.  EOMI.  Sclerae white, not injected.  Nares clear.  Pharynx without erythema or exudate.  Dentition intact.    Neck: No adenopathy.  No thyromegaly. Carotids +4/4 bilaterally without bruits.  No jugular venous distension.   Heart: RRR. 4/6 systolic murmur. No rub, click, or gallop. The PMI is in the 5th ICS in the midclavicular line. There is no heave.    Lungs: CTA.  No ronchi, wheezes, rales.  No dullness to percussion.   Abdomen: Soft, nontender, nondistended. No organomegaly.  No bruits. "   Extremities: No clubbing, cyanosis, or edema.  The pulses are +4/4 at the radial, brachial, femoral, popliteal, DP, and PT sites bilaterally.  No bruits are noted.  Neurologic: Alert and oriented to person/place/time, normal speech, gait and affect  Skin: No petechiae, purpura or rash.    Labs:  LIPID RESULTS:  Lab Results   Component Value Date    CHOL 242 (H) 11/22/2013    HDL 61 11/22/2013     (H) 11/22/2013    TRIG 156 (H) 11/22/2013    CHOLHDLRATIO 3.9 11/22/2013   Lipids 06-28-19   Ref Range & Units Value   Cholesterol 114 - 200 mg/dL 216High     HDL Cholesterol 40 - 60 mg/dL 67High     Triglycerides 10 - 200 mg/dL 149    LDL Cholesterol, Calculated  mg/dL 119      Thyroid Stimulating Hormone 0.40 - 3.99 uIU/mL 1.49      WBC 3.2 - 11.0 10*9/L 4.6    RBC 3.77 - 5.24 10*12/L 4.14    HGB 11.2 - 15.5 g/dL 12.6    HCT 34.3 - 46.0 % 38.5    MCV 81.4 - 99.0 fL 93.0    MCH 26.7 - 33.1 pg 30.4    MCHC 31.6 - 35.5 g/dL 32.7    RDW 11.3 - 14.6 % 14.4     - 375 10*9/L 191    Resulting Agency           LIVER ENZYME RESULTS:  Lab Results   Component Value Date    AST 15 08/06/2018    ALT 28 08/06/2018       CBC RESULTS:  Lab Results   Component Value Date    WBC 8.7 08/06/2018    RBC 4.64 08/06/2018    HGB 14.1 08/06/2018    HCT 42.4 08/06/2018    MCV 91 08/06/2018    MCH 30.4 08/06/2018    MCHC 33.3 08/06/2018    RDW 12.5 08/06/2018     08/06/2018       BMP RESULTS:  Lab Results   Component Value Date     07/09/2018    POTASSIUM 4.1 07/09/2018    CHLORIDE 108 07/09/2018    CO2 26 07/09/2018    ANIONGAP 8 07/09/2018    GLC 98 07/09/2018    BUN 25 07/09/2018    CR 0.78 07/09/2018    GFRESTIMATED 74 07/09/2018    GFRESTBLACK 90 07/09/2018    LINDEN 9.1 07/09/2018        INR RESULTS:  Lab Results   Component Value Date    INR 1.06 07/18/2018    INR 1.06 07/16/2018       Procedures:    Reviewed EKG, which was notable for sinus rhythm--no ischemic changes.    Assessment and Plan:   We discussed the  results with patient.  We discussed the importance of a heart healthy diet and lifestyle.  Patient is a 64 year old female with a PMH most remarkable for myasthenia gravis who is a former patient who presents for follow up due to the following issues:    1. Hypertension. Will increased patient's amlodipine from 2.5 mg to 5 mg daily. Continue with 20 mg of lisinopril.    2. Tachycardia. Concerning for atrial fibrillation or SVT. Will do 2 week ZioPatch.    3. Chest Pain. Atypical chest pain, will get CT-coronary angiogram.    4. Murmur. Probably aortic stenosis, will obtain echocardiogram.    Once we have all the results of the different tests, we will discuss therapeutic changes in function of the results.    Sean Torres MD, PhD  Professor of Medicine  Division of Cardiology      CC  Patient Care Team:  Taylor Osborn as PCP - General (Internal Medicine)  Frederick Vitale MD as MD (Neurology)  Sean Torres MD as MD (Cardiology)  Charito Marinelli MD as MD (Internal Medicine)  SELF, REFERRED    Answers for HPI/ROS submitted by the patient on 12/9/2019   General Symptoms: Yes  Skin Symptoms: No  HENT Symptoms: No  EYE SYMPTOMS: Yes  HEART SYMPTOMS: Yes  LUNG SYMPTOMS: Yes  INTESTINAL SYMPTOMS: Yes  URINARY SYMPTOMS: Yes  GYNECOLOGIC SYMPTOMS: No  BREAST SYMPTOMS: No  SKELETAL SYMPTOMS: Yes  BLOOD SYMPTOMS: Yes  NERVOUS SYSTEM SYMPTOMS: Yes  MENTAL HEALTH SYMPTOMS: Yes  Fever: No  Loss of appetite: No  Weight loss: Yes  Weight gain: No  Fatigue: Yes  Night sweats: Yes  Chills: No  Increased stress: Yes  Excessive hunger: Yes  Excessive thirst: No  Feeling hot or cold when others believe the temperature is normal: Yes  Loss of height: No  Post-operative complications: No  Surgical site pain: No  Hallucinations: No  Change in or Loss of Energy: Yes  Hyperactivity: No  Confusion: No  Eye pain: No  Vision loss: No  Dry eyes: Yes  Watery eyes: No  Eye bulging: No  Double vision: Yes  Flashing of lights:  No  Spots: Yes  Floaters: Yes  Redness: Yes  Crossed eyes: No  Tunnel Vision: No  Yellowing of eyes: No  Eye irritation: Yes  Cough: No  Sputum or phlegm: No  Coughing up blood: No  Difficulty breating or shortness of breath: Yes  Snoring: No  Wheezing: No  Difficulty breathing on exertion: Yes  Nighttime Cough: No  Difficulty breathing when lying flat: No  Chest pain or pressure: Yes  Fast or irregular heartbeat: Yes  Pain in legs with walking: No  Trouble breathing while lying down: No  Fingers or toes appear blue: No  High blood pressure: Yes  Low blood pressure: No  Fainting: No  Murmurs: Yes  Pacemaker: No  Varicose veins: Yes  Edema or swelling: No  Wake up at night with shortness of breath: No  Light-headedness: Yes  Exercise intolerance: Yes  Heart burn or indigestion: No  Nausea: No  Vomiting: No  Abdominal pain: No  Bloating: No  Constipation: No  Diarrhea: No  Blood in stool: No  Black stools: No  Rectal or Anal pain: No  Fecal incontinence: No  Yellowing of skin or eyes: No  Vomit with blood: No  Change in stools: No  Trouble holding urine or incontinence: Yes  Pain or burning: No  Trouble starting or stopping: Yes  Increased frequency of urination: No  Blood in urine: No  Decreased frequency of urination: No  Frequent nighttime urination: Yes  Flank pain: No  Difficulty emptying bladder: No  Back pain: Yes  Muscle aches: Yes  Neck pain: Yes  Swollen joints: No  Joint pain: Yes  Bone pain: No  Muscle cramps: Yes  Muscle weakness: Yes  Joint stiffness: No  Bone fracture: No  Anemia: No  Swollen glands: No  Easy bleeding or bruising: Yes  Trouble with coordination: Yes  Dizziness or trouble with balance: Yes  Fainting or black-out spells: No  Memory loss: No  Headache: No  Seizures: No  Speech problems: No  Tingling: No  Tremor: No  Weakness: Yes  Difficulty walking: No  Paralysis: No  Numbness: No  Nervous or Anxious: No  Depression: Yes  Trouble sleeping: No  Trouble thinking or concentrating: No  Mood  changes: No  Panic attacks: No

## 2019-12-17 ENCOUNTER — DOCUMENTATION ONLY (OUTPATIENT)
Dept: SLEEP MEDICINE | Facility: CLINIC | Age: 64
End: 2019-12-17

## 2019-12-17 DIAGNOSIS — G70.9 SLEEP-RELATED HYPOVENTILATION DUE TO NEUROMUSCULAR DISORDER (H): ICD-10-CM

## 2019-12-17 DIAGNOSIS — G70.00 MYASTHENIA GRAVIS WITHOUT EXACERBATION (H): ICD-10-CM

## 2019-12-17 DIAGNOSIS — G47.34 SLEEP RELATED HYPOXIA: ICD-10-CM

## 2019-12-17 DIAGNOSIS — G70.01 MG WITH EXACERBATION (MYASTHENIA GRAVIS) (H): ICD-10-CM

## 2019-12-17 DIAGNOSIS — G47.36 SLEEP-RELATED HYPOVENTILATION DUE TO NEUROMUSCULAR DISORDER (H): ICD-10-CM

## 2019-12-17 RX ORDER — HEPARIN SODIUM,PORCINE 10 UNIT/ML
5 VIAL (ML) INTRAVENOUS
Status: CANCELLED | OUTPATIENT
Start: 2019-12-17

## 2019-12-17 RX ORDER — DIPHENHYDRAMINE HCL 25 MG
50 CAPSULE ORAL ONCE
Status: CANCELLED | OUTPATIENT
Start: 2019-12-17

## 2019-12-17 RX ORDER — ACETAMINOPHEN 325 MG/1
650 TABLET ORAL ONCE
Status: CANCELLED
Start: 2019-12-17

## 2019-12-17 RX ORDER — SPIRONOLACTONE 100 MG/1
TABLET, FILM COATED ORAL
Qty: 5 TABLET | Refills: 2 | Status: SHIPPED | OUTPATIENT
Start: 2019-12-17 | End: 2021-05-20

## 2019-12-17 RX ORDER — HEPARIN SODIUM (PORCINE) LOCK FLUSH IV SOLN 100 UNIT/ML 100 UNIT/ML
5 SOLUTION INTRAVENOUS
Status: CANCELLED | OUTPATIENT
Start: 2019-12-17

## 2019-12-17 NOTE — Clinical Note
Disregard - called patient to let her know the Medicare requirements and she decided to cancel her appointment with you to try to find another provider/DME closer to her home.

## 2019-12-17 NOTE — PROGRESS NOTES
Received e-mail from Judith Shay stating patient called [facility] stating she needs a new water chamber after her scheduled appointment with Dr. Leyva on Thursday, 12/19/19.    Back in October 2019, patient requested CPAP supplies from [facility] and was informed that she would need to see her provider to get a new prescription.  During that process it was discovered that her sleep study was scored at 3%.  Patient has Medicare insurance and they require 4% scoring.  [facility] staff member, Jazzy Saba, stated that Dr. Wade said patient will not qualify for CPAP if the original study gets rescored at 4% because she had AHI 6.1 with 3% scoring.    Patient was able to get supplies in the past because she previously had [employer] insurance that accepts 3% scoring.    In order to get supplies, patient must complete a new sleep study (Diagnostic or Split Night) scored at 4% with AHI 5 or above.

## 2019-12-17 NOTE — PROGRESS NOTES
Called patient to inform her of the Medicare requirements regarding her sleep study and tell her that she could purchase a water chamber out-of-pocket, but no supplies can be billed to insurance.      Patient stated that [facility] is a two hour drive for her, so she is going to wait and see if she can find a new provider/DME supplier closer to her.      Patient cancelled her scheduled appointment with Dr. Leyva.

## 2019-12-17 NOTE — TELEPHONE ENCOUNTER
Rx Authorization:    Requested Medication/ Dose SPIRONOLACTONE 100MG TABS    Date last refill ordered: 6/4/19    Quantity ordered: 5    # refills: 2    Date of last clinic visit with ordering provider: 11/18/19    Date of next clinic visit with ordering provider: 5/18/20    All pertinent protocol data (lab date/result):     Include pertinent information from patients message:

## 2019-12-17 NOTE — Clinical Note
Please see detailed notes for this patient's appointment on Thursday - she has to do a new sleep study to get supplies.

## 2019-12-19 ENCOUNTER — DOCUMENTATION ONLY (OUTPATIENT)
Dept: SLEEP MEDICINE | Facility: CLINIC | Age: 64
End: 2019-12-19

## 2019-12-19 NOTE — PROGRESS NOTES
Per Dr. Leyva, patient is being treated for neuromuscular weakness and hypoxemia, not NANI.  Routed to [facility] in-house Respiratory Therapist for review to see if patient qualifies under RAD.

## 2019-12-19 NOTE — PROGRESS NOTES
RT was asked to review patient's case for her current bipap unit. Patient does not need a new sleep study. Patient has myasthenia gravis. She qualifies on the Medicare RAD chart under neuromuscular disease with her 6/20/16 MIP -50 (prior to initial bipap setup) which is less than 60 cmH2O per LCD.

## 2019-12-20 ENCOUNTER — HOSPITAL ENCOUNTER (OUTPATIENT)
Dept: CARDIOLOGY | Facility: CLINIC | Age: 64
End: 2019-12-20
Attending: INTERNAL MEDICINE
Payer: MEDICARE

## 2019-12-20 ENCOUNTER — HOSPITAL ENCOUNTER (OUTPATIENT)
Dept: CT IMAGING | Facility: CLINIC | Age: 64
End: 2019-12-20
Attending: INTERNAL MEDICINE
Payer: MEDICARE

## 2019-12-20 ENCOUNTER — HOSPITAL ENCOUNTER (OUTPATIENT)
Dept: CARDIOLOGY | Facility: CLINIC | Age: 64
Discharge: HOME | End: 2019-12-20
Attending: INTERNAL MEDICINE | Admitting: INTERNAL MEDICINE
Payer: MEDICARE

## 2019-12-20 VITALS — SYSTOLIC BLOOD PRESSURE: 148 MMHG | DIASTOLIC BLOOD PRESSURE: 86 MMHG | OXYGEN SATURATION: 95 %

## 2019-12-20 DIAGNOSIS — R06.02 SOB (SHORTNESS OF BREATH): ICD-10-CM

## 2019-12-20 DIAGNOSIS — I20.89 OTHER FORMS OF ANGINA PECTORIS (H): ICD-10-CM

## 2019-12-20 DIAGNOSIS — R00.2 PALPITATIONS: ICD-10-CM

## 2019-12-20 DIAGNOSIS — R01.1 SYSTOLIC MURMUR: ICD-10-CM

## 2019-12-20 PROCEDURE — 25000128 H RX IP 250 OP 636: Performed by: INTERNAL MEDICINE

## 2019-12-20 PROCEDURE — 75574 CT ANGIO HRT W/3D IMAGE: CPT

## 2019-12-20 PROCEDURE — 75574 CT ANGIO HRT W/3D IMAGE: CPT | Mod: 26 | Performed by: INTERNAL MEDICINE

## 2019-12-20 PROCEDURE — 0298T ZIO PATCH HOLTER ADULT PEDIATRIC GREATER THAN 48 HRS: CPT | Performed by: INTERNAL MEDICINE

## 2019-12-20 PROCEDURE — 25000132 ZZH RX MED GY IP 250 OP 250 PS 637: Mod: GY | Performed by: INTERNAL MEDICINE

## 2019-12-20 PROCEDURE — 93306 TTE W/DOPPLER COMPLETE: CPT | Mod: 26 | Performed by: INTERNAL MEDICINE

## 2019-12-20 PROCEDURE — 0296T ZIO PATCH HOLTER ADULT PEDIATRIC GREATER THAN 48 HRS: CPT

## 2019-12-20 PROCEDURE — 93306 TTE W/DOPPLER COMPLETE: CPT

## 2019-12-20 RX ORDER — ACYCLOVIR 200 MG/1
0-1 CAPSULE ORAL
Status: DISCONTINUED | OUTPATIENT
Start: 2019-12-20 | End: 2019-12-21 | Stop reason: HOSPADM

## 2019-12-20 RX ORDER — METOPROLOL TARTRATE 50 MG
50-100 TABLET ORAL
Status: COMPLETED | OUTPATIENT
Start: 2019-12-20 | End: 2019-12-20

## 2019-12-20 RX ORDER — IOPAMIDOL 755 MG/ML
120 INJECTION, SOLUTION INTRAVASCULAR ONCE
Status: COMPLETED | OUTPATIENT
Start: 2019-12-20 | End: 2019-12-20

## 2019-12-20 RX ORDER — DIPHENHYDRAMINE HCL 25 MG
25 CAPSULE ORAL
Status: DISCONTINUED | OUTPATIENT
Start: 2019-12-20 | End: 2019-12-21 | Stop reason: HOSPADM

## 2019-12-20 RX ORDER — DIPHENHYDRAMINE HYDROCHLORIDE 50 MG/ML
25-50 INJECTION INTRAMUSCULAR; INTRAVENOUS
Status: DISCONTINUED | OUTPATIENT
Start: 2019-12-20 | End: 2019-12-21 | Stop reason: HOSPADM

## 2019-12-20 RX ORDER — METOPROLOL TARTRATE 1 MG/ML
5-15 INJECTION, SOLUTION INTRAVENOUS
Status: DISCONTINUED | OUTPATIENT
Start: 2019-12-20 | End: 2019-12-21 | Stop reason: HOSPADM

## 2019-12-20 RX ORDER — ONDANSETRON 2 MG/ML
4 INJECTION INTRAMUSCULAR; INTRAVENOUS
Status: DISCONTINUED | OUTPATIENT
Start: 2019-12-20 | End: 2019-12-21 | Stop reason: HOSPADM

## 2019-12-20 RX ORDER — METHYLPREDNISOLONE SODIUM SUCCINATE 125 MG/2ML
125 INJECTION, POWDER, LYOPHILIZED, FOR SOLUTION INTRAMUSCULAR; INTRAVENOUS
Status: DISCONTINUED | OUTPATIENT
Start: 2019-12-20 | End: 2019-12-21 | Stop reason: HOSPADM

## 2019-12-20 RX ORDER — NITROGLYCERIN 0.4 MG/1
.4-.8 TABLET SUBLINGUAL
Status: DISCONTINUED | OUTPATIENT
Start: 2019-12-20 | End: 2019-12-21 | Stop reason: HOSPADM

## 2019-12-20 RX ADMIN — NITROGLYCERIN 0.8 MG: 0.4 TABLET SUBLINGUAL at 09:17

## 2019-12-20 RX ADMIN — IOPAMIDOL 120 ML: 755 INJECTION, SOLUTION INTRAVENOUS at 09:09

## 2019-12-20 RX ADMIN — METOPROLOL TARTRATE 100 MG: 100 TABLET, FILM COATED ORAL at 08:12

## 2019-12-20 NOTE — PROGRESS NOTES
Pt arrived for Coronary CT angiogram. Test, meds and side effects reviewed with pt.  Resting HR 75-80 bpm. Given 100 mg PO Metoprolol per verbal order. Administered 0.8 mg SL nitro on CTA table per order. CTA completed.  Patient tolerated procedure well and denies symptoms of allergic reaction.  Post monitoring completed and VSS.  D/C instructions reviewed with pt whom verbalized understanding of need to increase PO fluids today. D/C to gold waiting room accompanied by staff.

## 2020-01-25 DIAGNOSIS — R25.3 FASCICULATION: ICD-10-CM

## 2020-01-25 DIAGNOSIS — G70.00 MYASTHENIA GRAVIS WITHOUT EXACERBATION (H): ICD-10-CM

## 2020-01-27 RX ORDER — CYCLOBENZAPRINE HCL 10 MG
TABLET ORAL
Qty: 180 TABLET | Refills: 0 | Status: SHIPPED | OUTPATIENT
Start: 2020-01-27 | End: 2020-02-05

## 2020-01-27 NOTE — TELEPHONE ENCOUNTER
Rx Authorization:    Requested Medication/ Dose: Cyclobenzaprine 10mg tabs    Date last refill ordered: 1/9/2019    Quantity ordered: 18    # refills: 0    Date of last clinic visit with ordering provider: 11/18/2019    Date of next clinic visit with ordering provider: 5/18/2020    All pertinent protocol data (lab date/result):     Include pertinent information from patients message:

## 2020-01-30 ENCOUNTER — TELEPHONE (OUTPATIENT)
Dept: NEUROLOGY | Facility: CLINIC | Age: 65
End: 2020-01-30

## 2020-01-30 NOTE — TELEPHONE ENCOUNTER
Prior Authorization Retail Medication Request    Medication/Dose: Cyclobenzaprine 10 mg  ICD code (if different than what is on RX):    Previously Tried and Failed:    Rationale:      Insurance Name:    Insurance ID:        Pharmacy Information (if different than what is on RX)  Name:  [facility]  Phone:  [phone]

## 2020-02-05 DIAGNOSIS — G70.00 MYASTHENIA GRAVIS WITHOUT EXACERBATION (H): ICD-10-CM

## 2020-02-05 DIAGNOSIS — R25.3 FASCICULATION: ICD-10-CM

## 2020-02-05 RX ORDER — CYCLOBENZAPRINE HCL 10 MG
10 TABLET ORAL 2 TIMES DAILY PRN
Qty: 180 TABLET | Refills: 3 | Status: SHIPPED | OUTPATIENT
Start: 2020-02-05 | End: 2020-03-06

## 2020-02-06 NOTE — TELEPHONE ENCOUNTER
[facility] Call Center    Phone Message    May a detailed message be left on voicemail: yes     Reason for Call: Pt called and is frustrated that this med prior authorization is not getting completed correctly. Pt stated to contact the pharmacy at [phone] so there is no miscommunication. Thanks.    Action Taken: Message routed to:  [facility] ([facility] NEURO    Travel Screening: Not Applicable

## 2020-02-10 ENCOUNTER — HEALTH MAINTENANCE LETTER (OUTPATIENT)
Age: 65
End: 2020-02-10

## 2020-02-15 DIAGNOSIS — G70.00 MYASTHENIA GRAVIS WITHOUT EXACERBATION (H): Primary | ICD-10-CM

## 2020-02-17 RX ORDER — CARBAMAZEPINE 200 MG/1
CAPSULE, EXTENDED RELEASE ORAL
Qty: 180 CAPSULE | Refills: 1 | Status: SHIPPED | OUTPATIENT
Start: 2020-02-17 | End: 2021-02-22

## 2020-02-28 RX ORDER — HEPARIN SODIUM (PORCINE) LOCK FLUSH IV SOLN 100 UNIT/ML 100 UNIT/ML
5 SOLUTION INTRAVENOUS
Status: CANCELLED | OUTPATIENT
Start: 2021-02-26

## 2020-02-28 RX ORDER — HEPARIN SODIUM,PORCINE 10 UNIT/ML
5 VIAL (ML) INTRAVENOUS
Status: CANCELLED | OUTPATIENT
Start: 2021-02-26

## 2020-02-28 RX ORDER — ONDANSETRON 2 MG/ML
4 INJECTION INTRAMUSCULAR; INTRAVENOUS
Status: CANCELLED | OUTPATIENT
Start: 2021-02-26

## 2020-03-02 ENCOUNTER — MEDICAL CORRESPONDENCE (OUTPATIENT)
Dept: HEALTH INFORMATION MANAGEMENT | Facility: CLINIC | Age: 65
End: 2020-03-02

## 2020-03-03 DIAGNOSIS — G70.00 MYASTHENIA GRAVIS WITHOUT EXACERBATION (H): ICD-10-CM

## 2020-03-03 RX ORDER — CITALOPRAM HYDROBROMIDE 20 MG/1
TABLET ORAL
Qty: 90 TABLET | Refills: 1 | Status: SHIPPED | OUTPATIENT
Start: 2020-03-03 | End: 2020-09-09

## 2020-03-03 NOTE — TELEPHONE ENCOUNTER
Rx Authorization:    Requested Medication/ Dose CITALOPRAM 20MG TAB    Date last refill ordered: 9/19/19    Quantity ordered: 90    # refills: 1    Date of last clinic visit with ordering provider: 11/18/19    Date of next clinic visit with ordering provider: 5/18/20    All pertinent protocol data (lab date/result):     Include pertinent information from patients message:

## 2020-05-18 ENCOUNTER — VIRTUAL VISIT (OUTPATIENT)
Dept: NEUROLOGY | Facility: CLINIC | Age: 65
End: 2020-05-18
Payer: MEDICARE

## 2020-05-18 DIAGNOSIS — G47.36 SLEEP-RELATED HYPOVENTILATION DUE TO NEUROMUSCULAR DISORDER (H): Primary | ICD-10-CM

## 2020-05-18 DIAGNOSIS — G70.00 MYASTHENIA GRAVIS WITHOUT EXACERBATION (H): ICD-10-CM

## 2020-05-18 DIAGNOSIS — G70.9 SLEEP-RELATED HYPOVENTILATION DUE TO NEUROMUSCULAR DISORDER (H): Primary | ICD-10-CM

## 2020-05-18 RX ORDER — BIMATOPROST 0.3 MG/ML
1 SOLUTION/ DROPS OPHTHALMIC DAILY
COMMUNITY
Start: 2020-05-14

## 2020-05-18 NOTE — PROGRESS NOTES
[facility]      Martina Kim MRN# [id number]   YOB: 1955 Age: 64 year old      Date of Visit: May 18, 2020    Primary care provider: Taylor Osborn      History is obtained from the patient, family and medical record       Interval Change:      Martina Kim is a 64 year old female was seen and examined at the pediatric muscular dystrophy clinic on May 18, 2020 for a follow up evaluation of previously diagnosed myasthenia gravis. Since her previous visit she started treatment with Soliris. She had mild infusion reaction initially with some sensory disturbances which improved with slowing infusion down.   She has injection March 16 weekly through April 7, Increased dose April 15, she has started maintenance. Right after her first infusion she has noticed an improvement. Ms. Kim has improvement in her ocular symptoms, she denies double vision. She reports on improvement in swallowing. The leg strength is reduced but is better. Thus, she is able to plant flowers. She is doing much better with stairs. She does have more fatigue by the end of the end of day. She is not using BiPAP because she needs to have a humidifier which is broken. She has sacral pain which is worse after infusion and hips 6/10 worse after infusion. She thinks it is a muscle pain. She believes it is worse right after infusion for a few days. She increased prednisone 5 mg daily. She is not on IVIG since she started Soliris. IVIG was not providing her benefit anymore. Of note, she stated that her muscle twitching is improving as well. She is not seeing in her hand and much less in her distal legs.    Myasthenia Gravisd Activities of Daily Living (MG-ADL)    Function None=0 Mild=1 Moderate=2 Severe=3 Score   Talking Normal Intermittent slurring or nasal speech Constant slurring or nasal speech, but can be understood Difficult to understand    Chewing Normal Fatigue with solid food Fatigue  with soft food Gastric tube    Swallowing Normal Rare episode of chocking Frequent chocking necessitating changes in diet Gastric tube    Breathing Normal Shortness of breath with exertion Shortness of breath at rest Ventilator dependence    Impairment of ability to brush teeth and comb hairs Normal Extra effort, but no rest periods needed Rest periods needed Cannot do one of these functions    Impairment of ability to arise from a chair Normal Mild, sometimes uses arms Moderate, always uses arms Severe, requires assistance    Double vision Normal Occurs but not daily Daily, but not constant Constant    Eyelid droop Normal Occurs but not daily Daily, but not constant Constant                Immunizations:     There is no immunization history on file for this patient.         Allergies:      Allergies   Allergen Reactions     Bee Venom Anaphylaxis     Contrast Dye Rash     Other reaction(s): Intolerance-Can't Take  Worsens MG symptoms  Worsens MG symptoms  Patient has myasthenia gravis from xray dye given for ct angio head     Fentanyl And Related Nausea and Vomiting     Intractable vomiting     Wasp Venom Protein Anaphylaxis     Codeine Nausea and Vomiting     Fosamax [Alendronic Acid]      Other reaction(s): GI Upset  GERD     Morphine Nausea and Vomiting     Morphine Hcl Nausea and Vomiting     Pt states she is allergic to ALL Narcotics.     Oxycodone Nausea and Vomiting     Percocet [Oxycodone-Acetaminophen] Nausea and Vomiting     Phenytoin Nausea and Vomiting     Other reaction(s): GI Upset             Medications:     Prescription Medications as of 5/18/2020       Rx Number Disp Refills Start End Last Dispensed Date Next Fill Date Owning Pharmacy    amLODIPine (NORVASC) 5 MG tablet  90 tablet 3 12/9/2019    [facility] [address] [place] [address] [address]    Sig: Take 1 tablet (5 mg) by mouth At Bedtime    Class: E-Prescribe    Route: Oral    Ascorbic Acid (VITAMIN C PO)            Sig: Take 1,000 mg  by mouth daily    Class: Historical    Route: Oral    aspirin 81 MG tablet            Sig: Take  by mouth daily. 1 Tablet Daily    Class: Historical    Route: Oral    bimatoprost (LUMIGAN) 0.03 % ophthalmic solution    5/14/2020    [facility] [address] [place] [address] [address]    Sig: Place 1 drop into both eyes daily    Class: Historical    Route: Both Eyes    calcium carbonate (OS-LINDEN 500 MG [place]. [place]) 500 MG tablet            Sig: Take 500 mg by mouth 2 times daily    Class: Historical    Route: Oral    carBAMazepine (CARBATROL) 200 MG 12 hr capsule  180 capsule 1 2/17/2020    [facility] [address] [place] [address] [address]    Sig: TAKE ONE CAPSULE BY MOUTH TWICE A DAY    Class: E-Prescribe    Cholecalciferol (VITAMIN D PO)            Sig: Take 5,000 Units by mouth daily 1 Tablet Daily    Class: Historical    Route: Oral    citalopram (CELEXA) 20 MG tablet  90 tablet 1 3/3/2020    [facility] [address] [place] [address] [address]    Sig: TAKE ONE TABLET BY MOUTH ONCE DAILY    Class: E-Prescribe    IBUPROFEN PO            Sig: Take 800 mg by mouth every 8 hours as needed     Class: Historical    Route: Oral    LANsoprazole (PREVACID) 30 MG capsule            Sig: Take 30 mg by mouth daily 1 Tablet Daily    Class: Historical    Route: Oral    lisinopril (PRINIVIL/ZESTRIL) 20 MG tablet    6/28/2019        Sig: Take 20 mg by mouth    Class: Historical    Route: Oral    melatonin 5 MG CAPS            Sig: Take 20 mg by mouth At Bedtime    Class: Historical    Route: Oral    predniSONE (DELTASONE) 5 MG tablet  180 tablet 1 9/9/2019    [id number] - [place], [place] - [address] [facility]    Sig: TAKE TWO TABS, 10MG DAILY    Class: E-Prescribe    Thiamine HCl (VITAMIN  B-1) 50 MG tablet            Sig: Take 50 mg by mouth daily    Class: Historical    Route: Oral    VITAMIN A PO            Sig: Take 8,000 Units by mouth daily 1 Tablet Daily    Class: Historical    Route: Oral    VITAMIN E             Si Units daily 1 Tablet Daily    Class: Historical    Route: Does not apply    bimatoprost (LUMIGAN) 0.01 % SOLN            Sig: Place 1 drop into both eyes daily.     Class: Historical    Route: Both Eyes    cyclobenzaprine (FLEXERIL) 10 MG tablet (Ended)  180 tablet 3 2020 3/6/2020   [facility] [address] [place] [address] [address]    Sig: Take 1 tablet (10 mg) by mouth 2 times daily as needed for muscle spasms    Class: E-Prescribe    Route: Oral    diphenhydrAMINE (BENADRYL) 50 MG capsule            Sig: Take 50 mg by mouth every 6 hours as needed Every 4 hours during infusion    Class: Historical    Route: Oral    doxycycline hyclate (VIBRAMYCIN) 50 MG capsule        [facility] [address] [place] [address] [address]    Sig: Take 100 mg by mouth    Class: Historical    Route: Oral    fluorometholone (FML LIQUIFILM) 0.1 % ophthalmic susp   1 10/4/2017        Sig: Apply 1 drop to eye 3 times daily    Class: Historical    Route: Ophthalmic    HEALON 10 MG/ML SOLN intra-ocular inj   3 2017        Sig: Apply 1 Application topically 4 times daily    Class: Historical    Route: Topical    Immune Globulin, Human, (HIZENTRA) 10 GM/50ML SOLN  4 vial 3 2019    [facility]    Sig: Inject 70 mLs (14 g) Subcutaneous once a week    Class: Local Print    Route: Subcutaneous    Immune Globulin, Human, 10 GM/50ML SOLN  280 mL 3 2018    [facility] [address] [place] [address] [address]    Sig: Inject 70 mLs (14 g) Subcutaneous once a week    Class: E-Prescribe    Route: Subcutaneous    methylPREDNISolone sodium succinate (SOLU-MEDROL) 125 mg/2 mL injection            Sig: Inject 125 mg into the vein once    Class: Historical    Route: Intravenous    order for DME    2016        Sig: Respironics [place] AutoBiPAP 11/6 cm, ComfortGel Blue Pte nasal mask.    Class: Historical    spironolactone (ALDACTONE) 100 MG tablet  5 tablet 2 2019    [facility] [address] [place] [address] - [address]    Sig: TAKE ONE TABLET BY MOUTH ONCE DAILY BEFORE INFUSION    Class: E-Prescribe    tretinoin (RETIN-A) 0.025 % external cream  45 g 3 9/6/2019    [id number] [place] - [address] [facility]    Sig: Use every night    Class: E-Prescribe      Clinic-Administered Medications as of 5/18/2020       Dose Frequency Start End    lidocaine (PF) (XYLOCAINE) 1 % injection 300 mg 30 mL ONCE 1/4/2018     Route: Subcutaneous                Review of Systems:   The 10 point Review of Systems is negative other than noted in the HPI         Physical Exam:     There were no vitals taken for this visit.   Physical Exam:   General: NAD  Head: Normocephalic, atraumatic  Eyes: No conjunctival injection, no scleral icterus.  Mouth: No oral lesions, no erythema or exudate in the oropharynx  Respiratory: No increased work of breathing, SOB after exertion such as walking upstairs but fast recovery.  Neurologic:   Mental Status Exam: Alert, awake and easily engaged in interaction.   Cranial Nerves: EOMs intact, no nystagmus, facial movements symmetric,                 facial sensation intact to light touch, hearing intact to conversation.             Motor:Adequate functional strength at baseline.   Coordination: No overt dysmetria seen.    Gait:Normal, Able to take stairs with ease.             Data:   CBC:  Lab Results   Component Value Date    WBC 8.7 08/06/2018     Lab Results   Component Value Date    RBC 4.64 08/06/2018     Lab Results   Component Value Date    HGB 14.1 08/06/2018     Lab Results   Component Value Date    HCT 42.4 08/06/2018     No components found for: MCT  Lab Results   Component Value Date    MCV 91 08/06/2018     Lab Results   Component Value Date    MCH 30.4 08/06/2018     Lab Results   Component Value Date    MCHC 33.3 08/06/2018     Lab Results   Component Value Date    RDW 12.5 08/06/2018     Lab Results   Component Value Date     08/06/2018  "      Last Basic Metabolic Panel:  Lab Results   Component Value Date     07/09/2018      Lab Results   Component Value Date    POTASSIUM 4.1 07/09/2018     Lab Results   Component Value Date    CHLORIDE 108 07/09/2018     Lab Results   Component Value Date    LINDEN 9.1 07/09/2018     Lab Results   Component Value Date    CO2 26 07/09/2018     Lab Results   Component Value Date    BUN 25 07/09/2018     Lab Results   Component Value Date    CR 0.78 07/09/2018     Lab Results   Component Value Date    GLC 98 07/09/2018            Assessment and Recommendations:   Martina Kim is a 64 year old female presents with an 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  Improved on treatment with Soliris. 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 Later however is minor and has minimal symptomatic involvement.     Recommendations:  -Prednisone burst around infusion day before x 3 days at 20 mg   -BiPAP humidifier replacement.  -Continue Soliris  -RTC 6 months    The patient has been notified of following:      \"This video visit will be conducted via a call between you and your physician/provider. We have found that certain health care needs can be provided without the need for an in-person physical exam.  This service lets us provide the care you need with a video conversation.  If a prescription is necessary we can send it directly to your pharmacy.  If lab work is needed we can place an order for that and you can then stop by our lab to have the test done at a later time.     Video visits are billed at different rates depending on your insurance coverage.  Please reach out to your insurance provider with any questions.     If during the course of the call the " "physician/provider feels a video visit is not appropriate, you will not be charged for this service.\"     Patient has given verbal consent for Video visit?YES     How would you like to obtain your AVS? Shanon     Patient would like the video invitation sent by: Text [phone]     Will anyone else be joining your video visit? no  [phone]}        Video-Visit Details     Type of service:  Video Visit     Video Start Time: 11:43     Video End Time: 12:17    Originating Location (pt. Location): Home     Distant Location (provider location):  [facility]      Platform used for Video Visit: [employer]    I spent total of 34 minutes in face-to-face during today's visit. Over 50% of this time was spent counseling the patient and coordinating care. See note for details.    Frederick Vitale MD  [phone]         Patient Care Team:  Taylor Osborn as PCP - General (Internal Medicine)  Frederick Vitale MD as MD (Neurology)  Sean Torres MD as MD (Cardiology)  Charito Marinelli MD as MD (Internal Medicine)  ASHWIN SUAREZ A    Copy to patient  IVY PETERSON  [address] [place] [address]        "

## 2020-05-18 NOTE — PROGRESS NOTES
"Martina Kim is a 64 year old female who is being evaluated via a billable video visit.      The patient has been notified of following:     \"This video visit will be conducted via a call between you and your physician/provider. We have found that certain health care needs can be provided without the need for an in-person physical exam.  This service lets us provide the care you need with a video conversation.  If a prescription is necessary we can send it directly to your pharmacy.  If lab work is needed we can place an order for that and you can then stop by our lab to have the test done at a later time.    Video visits are billed at different rates depending on your insurance coverage.  Please reach out to your insurance provider with any questions.    If during the course of the call the physician/provider feels a video visit is not appropriate, you will not be charged for this service.\"    Patient has given verbal consent for Video visit?YES    How would you like to obtain your AVS? [facility]    Patient would like the video invitation sent by: Text [phone]    Will anyone else be joining your video visit? no        Video-Visit Details    Type of service:  Video Visit    Video Start Time:    Video End Time:     Originating Location (pt. Location): Home    Distant Location (provider location):  [facility]     Platform used for Video Visit: Damir Zamora EMT    "

## 2020-06-18 DIAGNOSIS — G70.00 MYASTHENIA GRAVIS WITHOUT EXACERBATION (H): ICD-10-CM

## 2020-06-18 RX ORDER — PREDNISONE 5 MG/1
5 TABLET ORAL DAILY
Qty: 30 TABLET | Refills: 3 | Status: SHIPPED | OUTPATIENT
Start: 2020-06-18 | End: 2020-07-07

## 2020-07-07 ENCOUNTER — MYC MEDICAL ADVICE (OUTPATIENT)
Dept: NEUROLOGY | Facility: CLINIC | Age: 65
End: 2020-07-07

## 2020-07-07 DIAGNOSIS — G70.00 MYASTHENIA GRAVIS WITHOUT EXACERBATION (H): ICD-10-CM

## 2020-07-07 RX ORDER — PREDNISONE 5 MG/1
TABLET ORAL
Qty: 180 TABLET | Refills: 3 | Status: SHIPPED | OUTPATIENT
Start: 2020-07-07 | End: 2021-02-22

## 2020-09-09 DIAGNOSIS — G70.00 MYASTHENIA GRAVIS WITHOUT EXACERBATION (H): ICD-10-CM

## 2020-09-09 RX ORDER — CITALOPRAM HYDROBROMIDE 20 MG/1
TABLET ORAL
Qty: 90 TABLET | Refills: 1 | Status: SHIPPED | OUTPATIENT
Start: 2020-09-09 | End: 2021-02-22

## 2020-11-14 ENCOUNTER — HEALTH MAINTENANCE LETTER (OUTPATIENT)
Age: 65
End: 2020-11-14

## 2021-02-04 DIAGNOSIS — I10 ESSENTIAL HYPERTENSION: ICD-10-CM

## 2021-02-08 RX ORDER — AMLODIPINE BESYLATE 5 MG/1
5 TABLET ORAL DAILY
Qty: 90 TABLET | Refills: 0 | Status: SHIPPED | OUTPATIENT
Start: 2021-02-08

## 2021-02-16 ENCOUNTER — DOCUMENTATION ONLY (OUTPATIENT)
Dept: CARE COORDINATION | Facility: CLINIC | Age: 66
End: 2021-02-16

## 2021-02-16 DIAGNOSIS — G70.00 MYASTHENIA GRAVIS WITHOUT EXACERBATION (H): Primary | ICD-10-CM

## 2021-02-22 ENCOUNTER — OFFICE VISIT (OUTPATIENT)
Dept: NEUROLOGY | Facility: CLINIC | Age: 66
End: 2021-02-22
Payer: MEDICARE

## 2021-02-22 ENCOUNTER — TELEPHONE (OUTPATIENT)
Dept: NEUROLOGY | Facility: CLINIC | Age: 66
End: 2021-02-22

## 2021-02-22 VITALS
OXYGEN SATURATION: 95 % | SYSTOLIC BLOOD PRESSURE: 142 MMHG | HEART RATE: 83 BPM | WEIGHT: 174 LBS | DIASTOLIC BLOOD PRESSURE: 89 MMHG | RESPIRATION RATE: 16 BRPM | BODY MASS INDEX: 29.87 KG/M2

## 2021-02-22 DIAGNOSIS — F32.0 MILD MAJOR DEPRESSION (H): Primary | ICD-10-CM

## 2021-02-22 DIAGNOSIS — R53.83 OTHER FATIGUE: ICD-10-CM

## 2021-02-22 DIAGNOSIS — Z02.83 ENCOUNTER FOR DRUG SCREENING: ICD-10-CM

## 2021-02-22 DIAGNOSIS — G70.00 MYASTHENIA GRAVIS WITHOUT EXACERBATION (H): ICD-10-CM

## 2021-02-22 LAB
ALBUMIN SERPL-MCNC: 4.2 G/DL (ref 3.4–5)
ALP SERPL-CCNC: 56 U/L (ref 40–150)
ALT SERPL W P-5'-P-CCNC: 53 U/L (ref 0–50)
ANION GAP SERPL CALCULATED.3IONS-SCNC: 5 MMOL/L (ref 3–14)
AST SERPL W P-5'-P-CCNC: 26 U/L (ref 0–45)
BILIRUB SERPL-MCNC: 0.4 MG/DL (ref 0.2–1.3)
BUN SERPL-MCNC: 18 MG/DL (ref 7–30)
CALCIUM SERPL-MCNC: 9.4 MG/DL (ref 8.5–10.1)
CHLORIDE SERPL-SCNC: 105 MMOL/L (ref 94–109)
CO2 SERPL-SCNC: 29 MMOL/L (ref 20–32)
CREAT SERPL-MCNC: 0.78 MG/DL (ref 0.52–1.04)
ERYTHROCYTE [DISTWIDTH] IN BLOOD BY AUTOMATED COUNT: 13 % (ref 10–15)
GFR SERPL CREATININE-BSD FRML MDRD: 78 ML/MIN/{1.73_M2}
GLUCOSE SERPL-MCNC: 107 MG/DL (ref 70–99)
HCT VFR BLD AUTO: 47.1 % (ref 35–47)
HGB BLD-MCNC: 15 G/DL (ref 11.7–15.7)
MCH RBC QN AUTO: 30.2 PG (ref 26.5–33)
MCHC RBC AUTO-ENTMCNC: 31.8 G/DL (ref 31.5–36.5)
MCV RBC AUTO: 95 FL (ref 78–100)
PLATELET # BLD AUTO: 214 10E9/L (ref 150–450)
POTASSIUM SERPL-SCNC: 4.5 MMOL/L (ref 3.4–5.3)
PROT SERPL-MCNC: 7.8 G/DL (ref 6.8–8.8)
RBC # BLD AUTO: 4.97 10E12/L (ref 3.8–5.2)
SODIUM SERPL-SCNC: 138 MMOL/L (ref 133–144)
TSH SERPL DL<=0.005 MIU/L-ACNC: 1.18 MU/L (ref 0.4–4)
VIT B12 SERPL-MCNC: 377 PG/ML (ref 193–986)
WBC # BLD AUTO: 6.3 10E9/L (ref 4–11)

## 2021-02-22 PROCEDURE — 82607 VITAMIN B-12: CPT | Performed by: PATHOLOGY

## 2021-02-22 PROCEDURE — 36415 COLL VENOUS BLD VENIPUNCTURE: CPT | Performed by: PATHOLOGY

## 2021-02-22 PROCEDURE — 99215 OFFICE O/P EST HI 40 MIN: CPT | Performed by: PSYCHIATRY & NEUROLOGY

## 2021-02-22 PROCEDURE — 85027 COMPLETE CBC AUTOMATED: CPT | Performed by: PATHOLOGY

## 2021-02-22 PROCEDURE — 84443 ASSAY THYROID STIM HORMONE: CPT | Performed by: PATHOLOGY

## 2021-02-22 PROCEDURE — 80053 COMPREHEN METABOLIC PANEL: CPT | Performed by: PATHOLOGY

## 2021-02-22 PROCEDURE — 94060 EVALUATION OF WHEEZING: CPT

## 2021-02-22 RX ORDER — CITALOPRAM HYDROBROMIDE 20 MG/1
30 TABLET ORAL DAILY
Qty: 45 TABLET | Refills: 3 | Status: SHIPPED | OUTPATIENT
Start: 2021-02-22 | End: 2021-08-09

## 2021-02-22 RX ORDER — PREDNISONE 5 MG/1
7.5 TABLET ORAL DAILY
Qty: 45 TABLET | Refills: 5 | Status: SHIPPED | OUTPATIENT
Start: 2021-02-22 | End: 2021-03-24

## 2021-02-22 RX ORDER — ECULIZUMAB 300 MG/30ML
INJECTION, SOLUTION, CONCENTRATE INTRAVENOUS
COMMUNITY

## 2021-02-22 ASSESSMENT — PAIN SCALES - GENERAL: PAINLEVEL: NO PAIN (0)

## 2021-02-22 NOTE — TELEPHONE ENCOUNTER
Per Dr. Vitale, patient's Soliris infusion has been updated.  New infusion orders faxed to [facility] (phone [phone], fax [phone]).    Kim Bryant RN

## 2021-02-22 NOTE — LETTER
2/22/2021       RE: Mratina Kim  [address] [place] [address]     Dear Colleague,    Thank you for referring your patient, Martina Kim, to the [facility] NEUROLOGY [facility] [place] at [facility]. Please see a copy of my visit note below.                 [facility]                  Muscular Dystrophy Clinic Note     Martina Kim MRN# [id number]   YOB: 1955 Age: 65 year old      Date of Visit: Feb 22, 2021    Primary care provider: Taylor Osborn    Refering physician:         Chief Complaint:   Fatigue       History is obtained from the patient, family and medical record       Interval Change:      Martina Kim is a 65 year old female was seen and examined at the muscular dystrophy clinic on Feb 22, 2021 for evaluation of   She is very fatigued. She gets easily short of breath. She would sleep all day long. She might feel there is some depression as well.  She stopped going upstairs and she has no stamina.   Her BiPAP is broken but would not be replaced without repeat PFT. She does feel her fatigue is worse since she is not using her BiPAP.   MG symptoms (bold reflects current status)  Symptoms     Comment   General    Normal Good Fair Poor Due to severe fatigue   Diplopia   None Rare Frequent Constant    Ptosis   None Rare Frequent Constant    UE  weakness None Slightly limited Some ADL difficulties ADL limited    LE  weakness None Run/walk fatigue Short distance Minimal walking    Dysarthria   None Mild Moderate Severe    Voice   Normal Fade Soft Very quiet Difficulty controling volume   Chew fatigue None Fatigue Fatigue soft food Tube feeding    Dysphagia   None Normal food Soft food Tube feeding    Dyspnea None Normal effort Any effort At rest/ Resp support                      Allergies:      Allergies   Allergen Reactions     Bee Venom Anaphylaxis     Contrast Dye Rash      Other reaction(s): Intolerance-Can't Take  Worsens MG symptoms  Worsens MG symptoms  Patient has myasthenia gravis from xray dye given for ct angio head     Fentanyl And Related Nausea and Vomiting     Intractable vomiting     Wasp Venom Protein Anaphylaxis     Codeine Nausea and Vomiting     Fosamax [Alendronic Acid]      Other reaction(s): GI Upset  GERD     Morphine Nausea and Vomiting     Morphine Hcl Nausea and Vomiting     Pt states she is allergic to ALL Narcotics.     Oxycodone Nausea and Vomiting     Percocet [Oxycodone-Acetaminophen] Nausea and Vomiting     Phenytoin Nausea and Vomiting     Other reaction(s): GI Upset             Medications:     Prescription Medications as of 2/22/2021       Rx Number Disp Refills Start End Last Dispensed Date Next Fill Date Owning Pharmacy    amLODIPine (NORVASC) 5 MG tablet  90 tablet 0 2/8/2021    [facility] [address] [place] [address] [id number]    Sig: Take 1 tablet (5 mg) by mouth daily    Class: E-Prescribe    Notes to Pharmacy: Please remind patient she is due for some blood work, should call her clinic    Route: Oral    Ascorbic Acid (VITAMIN C PO)            Sig: Take 1,000 mg by mouth daily    Class: Historical    Route: Oral    aspirin 81 MG tablet            Sig: Take  by mouth daily. 1 Tablet Daily    Class: Historical    Route: Oral    bimatoprost (LUMIGAN) 0.03 % ophthalmic solution    5/14/2020    [facility] [address] [place] [address] [address]    Sig: Place 1 drop into both eyes daily    Class: Historical    Route: Both Eyes    calcium carbonate (OS-LINDEN 500 MG [place]. [place]) 500 MG tablet            Sig: Take 500 mg by mouth 2 times daily    Class: Historical    Route: Oral    Cholecalciferol (VITAMIN D PO)            Sig: Take 5,000 Units by mouth daily 1 Tablet Daily    Class: Historical    Route: Oral    citalopram (CELEXA) 20 MG tablet  90 tablet 1 9/9/2020    \[facility] [address] [place] [address] [address]    Sig: TAKE ONE  TABLET BY MOUTH ONCE DAILY    Class: E-Prescribe    diphenhydrAMINE (BENADRYL) 50 MG capsule            Sig: Take 50 mg by mouth every 6 hours as needed Every 4 hours during infusion    Class: Historical    Route: Oral    eculizumab (SOLIRIS) 300 MG/30ML SOLN injection        [facility] [address] [place], [place] [id number]    Class: Historical    Route: Intravenous    IBUPROFEN PO            Sig: Take 800 mg by mouth every 8 hours as needed     Class: Historical    Route: Oral    LANsoprazole (PREVACID) 30 MG capsule            Sig: Take 30 mg by mouth daily 1 Tablet Daily    Class: Historical    Route: Oral    lisinopril (PRINIVIL/ZESTRIL) 20 MG tablet    2019        Sig: Take 20 mg by mouth    Class: Historical    Route: Oral    melatonin 5 MG CAPS            Sig: Take 20 mg by mouth At Bedtime    Class: Historical    Route: Oral    predniSONE (DELTASONE) 5 MG tablet  180 tablet 3 2020    [facility] [address] [place] [address] [address]    Sig: Take 5 mg daily and 20 mg the day before and day after Soliris infusions qow.    Class: E-Prescribe    Thiamine HCl (VITAMIN  B-1) 50 MG tablet            Sig: Take 50 mg by mouth daily    Class: Historical    Route: Oral    VITAMIN A PO            Sig: Take 8,000 Units by mouth daily 1 Tablet Daily    Class: Historical    Route: Oral    VITAMIN E            Si Units daily 1 Tablet Daily    Class: Historical    Route: Does not apply    bimatoprost (LUMIGAN) 0.01 % SOLN            Sig: Place 1 drop into both eyes daily.     Class: Historical    Route: Both Eyes    carBAMazepine (CARBATROL) 200 MG 12 hr capsule  180 capsule 1 2020    [facility] [address] [place], [place] [id number]    Sig: TAKE ONE CAPSULE BY MOUTH TWICE A DAY    Class: E-Prescribe    doxycycline hyclate (VIBRAMYCIN) 50 MG capsule        [facility] [address] [place], [place] [id number]    Sig: Take 100 mg by mouth    Class: Historical    Route: Oral     fluorometholone (FML LIQUIFILM) 0.1 % ophthalmic susp   1 10/4/2017        Sig: Apply 1 drop to eye 3 times daily    Class: Historical    Route: Ophthalmic    HEALON 10 MG/ML SOLN intra-ocular inj   3 11/21/2017        Sig: Apply 1 Application topically 4 times daily    Class: Historical    Route: Topical    Immune Globulin, Human, (HIZENTRA) 10 GM/50ML SOLN  4 vial 3 1/21/2019    [facility]    Sig: Inject 70 mLs (14 g) Subcutaneous once a week    Class: Local Print    Route: Subcutaneous    Immune Globulin, Human, 10 GM/50ML SOLN  280 mL 3 9/25/2018    [facility] [address] [place] [address] [address]    Sig: Inject 70 mLs (14 g) Subcutaneous once a week    Class: E-Prescribe    Route: Subcutaneous    methylPREDNISolone sodium succinate (SOLU-MEDROL) 125 mg/2 mL injection            Sig: Inject 125 mg into the vein once    Class: Historical    Route: Intravenous    order for DME    7/29/2016        Sig: [employer] Dream Station AutoBiPAP 11/6 cm, ComfortGel Blue Pte nasal mask.    Class: Historical    spironolactone (ALDACTONE) 100 MG tablet  5 tablet 2 12/17/2019    [facility] [address] [place] [address] [address]    Sig: TAKE ONE TABLET BY MOUTH ONCE DAILY BEFORE INFUSION    Class: E-Prescribe    tretinoin (RETIN-A) 0.025 % external cream  45 g 3 9/6/2019    [id number] - [place], [place] - [address]    Sig: Use every night    Class: E-Prescribe      Clinic-Administered Medications as of 2/22/2021       Dose Frequency Start End    lidocaine (PF) (XYLOCAINE) 1 % injection 300 mg 30 mL ONCE 1/4/2018     Route: Subcutaneous                Review of Systems:   Answers for HPI/ROS submitted by the patient on 2/22/2021   General Symptoms: Yes, significant fatigue and hypersomnolence  Skin Symptoms: No  HENT Symptoms: Yes  EYE SYMPTOMS: Yes diplopia, ptosis  HEART SYMPTOMS: Yes  LUNG SYMPTOMS: Yes SOB  INTESTINAL SYMPTOMS: No  URINARY SYMPTOMS: Yes  GYNECOLOGIC SYMPTOMS: No  BREAST  SYMPTOMS: No  SKELETAL SYMPTOMS: Yes  BLOOD SYMPTOMS: Yes  NERVOUS SYSTEM SYMPTOMS: Yes as above  MENTAL HEALTH SYMPTOMS: Yes  Hot flashes 10-15 times per day.  She has some incontinence  She has some knee issues. She gets injection.       Physical Exam:     BP (!) 142/89   Pulse 83   Resp 16   Wt 78.9 kg (174 lb)   SpO2 95%   BMI 29.87 kg/m     Physical Exam:   General: NAD  Head: Normocephalic, atraumatic  Eyes: No conjunctival injection, no scleral icterus.  Mouth: No oral lesions, no erythema or exudate in the oropharynx  Respiratory: Some dyspnea.    Neurologic:   Mental Status Exam: Alert, awake and easily engaged in interaction.   Cranial Nerves: PERRLA, EOMs intact, no nystagmus, mild facial weakness, facial movements symmetric,                 facial sensation intact to light touch, hearing intact to conversation, palate and uvula               rise symmetrically, no deviation in uvula or tongue, tongue midline and fully mobile                with no atrophy or fasciculations.    Motor: Normal tone in all four extremities, no atrophy or fasciculations.             Manual Motor Exam     Right Left A F  Right Left A F   Shoulder Abduction 4 4   Hip Flexion 4 4     Elbow Flexion 5 5   Knee Extension 5 5     Elbow Extension 5 5   Knee Flexion 5 5     Wrist Extension 5 5   Foot Dorsiflexion 5 5     Wrist flexion 5 5   Foot Plantar Flexion 5 5                                                                                                                                                        Reflexes   Right Left  Right Left   Biceps 2 2 Patellar 2 2   Triceps 2 2 Achilles 2 2   Brachioradialis 2 2 Babinski Absent Absent       Coordination and Gait  Gait 0 Normal   Right Left   Romberg 3 Not tested  Heel 2 Not tested 2 Not tested   Tandem 2 Not tested  Toe 2 Not tested 2 Not tested      Coordination: No overt dysmetria seen.    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  Improved on treatment with Soliris initially but now seems to be with some worsening mainly in her fatigue. She has mild restrictive lung disease but is not able to use her BiPAP which is very helpful. 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 Later however is minor and has minimal symptomatic involvement.   Depression seems to be slightly worse.     Recommendations:  -Prednisone reduce to 7.5 mg daily  -Increase Celexa to 30 mg, she will contact if depression is worse.   -BiPAP humidifier replacement.  -Continue Soliris  -handicapped parking form signed.  -pulmonary medicine follow up with Dr. Marinelli.  -RTC 6 months    I have spent  45 min spent on the date of the encounter in chart review, patient visit, review of tests, counseling the patient, documentation and/or discussion with other providers about the issues documented above. See note for details.    Sincerely,        Frederick Vitale MD  Pediatric Neurology  [phone]         CC  Patient Care Team:  Taylor Osborn as PCP - General (Internal Medicine)  Charito Marinelli MD as MD (Internal Medicine)  Sean Torres MD as Assigned Heart and Vascular Provider  Doug Rooney MD as Assigned Surgical Provider    Copy to patient  IVY [place]  [address] [place] [address]

## 2021-02-22 NOTE — TELEPHONE ENCOUNTER
Christiane is calling from [facility] (phone [phone], fax [phone]), in regards to the Soliris order. Christiane is requesting the order to indicate patient needs it done every 2 weeks . The current order indicates patients needs it done every week.     Please revise orders and resend orders.

## 2021-02-22 NOTE — PROGRESS NOTES
[facility]                  Muscular Dystrophy Clinic Note     Martina Kim MRN# [id number]   YOB: 1955 Age: 65 year old      Date of Visit: Feb 22, 2021    Primary care provider: Taylor Osborn    Refering physician:         Chief Complaint:   Fatigue       History is obtained from the patient, family and medical record       Interval Change:      Martina Kim is a 65 year old female was seen and examined at the muscular dystrophy clinic on Feb 22, 2021 for evaluation of   She is very fatigued. She gets easily short of breath. She would sleep all day long. She might feel there is some depression as well.  She stopped going upstairs and she has no stamina.   Her BiPAP is broken but would not be replaced without repeat PFT. She does feel her fatigue is worse since she is not using her BiPAP.   MG symptoms (bold reflects current status)  Symptoms     Comment   General    Normal Good Fair Poor Due to severe fatigue   Diplopia   None Rare Frequent Constant    Ptosis   None Rare Frequent Constant    UE  weakness None Slightly limited Some ADL difficulties ADL limited    LE  weakness None Run/walk fatigue Short distance Minimal walking    Dysarthria   None Mild Moderate Severe    Voice   Normal Fade Soft Very quiet Difficulty controling volume   Chew fatigue None Fatigue Fatigue soft food Tube feeding    Dysphagia   None Normal food Soft food Tube feeding    Dyspnea None Normal effort Any effort At rest/ Resp support                      Allergies:      Allergies   Allergen Reactions     Bee Venom Anaphylaxis     Contrast Dye Rash     Other reaction(s): Intolerance-Can't Take  Worsens MG symptoms  Worsens MG symptoms  Patient has myasthenia gravis from xray dye given for ct angio head     Fentanyl And Related Nausea and Vomiting     Intractable vomiting     Wasp Venom Protein Anaphylaxis     Codeine Nausea and Vomiting     Fosamax [Alendronic Acid]      Other  reaction(s): GI Upset  GERD     Morphine Nausea and Vomiting     Morphine Hcl Nausea and Vomiting     Pt states she is allergic to ALL Narcotics.     Oxycodone Nausea and Vomiting     Percocet [Oxycodone-Acetaminophen] Nausea and Vomiting     Phenytoin Nausea and Vomiting     Other reaction(s): GI Upset             Medications:     Prescription Medications as of 2/22/2021       Rx Number Disp Refills Start End Last Dispensed Date Next Fill Date Owning Pharmacy    amLODIPine (NORVASC) 5 MG tablet  90 tablet 0 2/8/2021    [facility] [address] [place] [address] [address]    Sig: Take 1 tablet (5 mg) by mouth daily    Class: E-Prescribe    Notes to Pharmacy: Please remind patient she is due for some blood work, should call her clinic    Route: Oral    Ascorbic Acid (VITAMIN C PO)            Sig: Take 1,000 mg by mouth daily    Class: Historical    Route: Oral    aspirin 81 MG tablet            Sig: Take  by mouth daily. 1 Tablet Daily    Class: Historical    Route: Oral    bimatoprost (LUMIGAN) 0.03 % ophthalmic solution    5/14/2020    [facility] [address] [place] [address] [address]    Sig: Place 1 drop into both eyes daily    Class: Historical    Route: Both Eyes    calcium carbonate (OS-LINDEN 500 MG [place]. [place]) 500 MG tablet            Sig: Take 500 mg by mouth 2 times daily    Class: Historical    Route: Oral    Cholecalciferol (VITAMIN D PO)            Sig: Take 5,000 Units by mouth daily 1 Tablet Daily    Class: Historical    Route: Oral    citalopram (CELEXA) 20 MG tablet  90 tablet 1 9/9/2020    [facility] [address] [place] [address] [address]    Sig: TAKE ONE TABLET BY MOUTH ONCE DAILY    Class: E-Prescribe    diphenhydrAMINE (BENADRYL) 50 MG capsule            Sig: Take 50 mg by mouth every 6 hours as needed Every 4 hours during infusion    Class: Historical    Route: Oral    eculizumab (SOLIRIS) 300 MG/30ML SOLN injection        [facility] [address] [place], [place] [id number]     Class: Historical    Route: Intravenous    IBUPROFEN PO            Sig: Take 800 mg by mouth every 8 hours as needed     Class: Historical    Route: Oral    LANsoprazole (PREVACID) 30 MG capsule            Sig: Take 30 mg by mouth daily 1 Tablet Daily    Class: Historical    Route: Oral    lisinopril (PRINIVIL/ZESTRIL) 20 MG tablet    2019        Sig: Take 20 mg by mouth    Class: Historical    Route: Oral    melatonin 5 MG CAPS            Sig: Take 20 mg by mouth At Bedtime    Class: Historical    Route: Oral    predniSONE (DELTASONE) 5 MG tablet  180 tablet 3 2020    [facility] [address] [place] [address] [address]    Sig: Take 5 mg daily and 20 mg the day before and day after Soliris infusions qow.    Class: E-Prescribe    Thiamine HCl (VITAMIN  B-1) 50 MG tablet            Sig: Take 50 mg by mouth daily    Class: Historical    Route: Oral    VITAMIN A PO            Sig: Take 8,000 Units by mouth daily 1 Tablet Daily    Class: Historical    Route: Oral    VITAMIN E            Si Units daily 1 Tablet Daily    Class: Historical    Route: Does not apply    bimatoprost (LUMIGAN) 0.01 % SOLN            Sig: Place 1 drop into both eyes daily.     Class: Historical    Route: Both Eyes    carBAMazepine (CARBATROL) 200 MG 12 hr capsule  180 capsule 1 2020    [facility] [address] [place] [address] [address]    Sig: TAKE ONE CAPSULE BY MOUTH TWICE A DAY    Class: E-Prescribe    doxycycline hyclate (VIBRAMYCIN) 50 MG capsule        [facility] [address] [place] [address] [address]    Sig: Take 100 mg by mouth    Class: Historical    Route: Oral    fluorometholone (FML LIQUIFILM) 0.1 % ophthalmic susp   1 10/4/2017        Sig: Apply 1 drop to eye 3 times daily    Class: Historical    Route: Ophthalmic    HEALON 10 MG/ML SOLN intra-ocular inj   3 2017        Sig: Apply 1 Application topically 4 times daily    Class: Historical    Route: Topical    Immune Globulin, Human,  (HIZENTRA) 10 GM/50ML SOLN  4 vial 3 1/21/2019    [facility]    Sig: Inject 70 mLs (14 g) Subcutaneous once a week    Class: Local Print    Route: Subcutaneous    Immune Globulin, Human, 10 GM/50ML SOLN  280 mL 3 9/25/2018    [facility] [address] [place] [address] [address]    Sig: Inject 70 mLs (14 g) Subcutaneous once a week    Class: E-Prescribe    Route: Subcutaneous    methylPREDNISolone sodium succinate (SOLU-MEDROL) 125 mg/2 mL injection            Sig: Inject 125 mg into the vein once    Class: Historical    Route: Intravenous    order for DME    7/29/2016        Sig: [employer] Dream Station AutoBiPAP 11/6 cm, ComfortGel Blue Pte nasal mask.    Class: Historical    spironolactone (ALDACTONE) 100 MG tablet  5 tablet 2 12/17/2019    [facility] [address] [place] [address] [address]    Sig: TAKE ONE TABLET BY MOUTH ONCE DAILY BEFORE INFUSION    Class: E-Prescribe    tretinoin (RETIN-A) 0.025 % external cream  45 g 3 9/6/2019    [id number] - [place], [place] - [address]    Sig: Use every night    Class: E-Prescribe      Clinic-Administered Medications as of 2/22/2021       Dose Frequency Start End    lidocaine (PF) (XYLOCAINE) 1 % injection 300 mg 30 mL ONCE 1/4/2018     Route: Subcutaneous                Review of Systems:   Answers for HPI/ROS submitted by the patient on 2/22/2021   General Symptoms: Yes, significant fatigue and hypersomnolence  Skin Symptoms: No  HENT Symptoms: Yes  EYE SYMPTOMS: Yes diplopia, ptosis  HEART SYMPTOMS: Yes  LUNG SYMPTOMS: Yes SOB  INTESTINAL SYMPTOMS: No  URINARY SYMPTOMS: Yes  GYNECOLOGIC SYMPTOMS: No  BREAST SYMPTOMS: No  SKELETAL SYMPTOMS: Yes  BLOOD SYMPTOMS: Yes  NERVOUS SYSTEM SYMPTOMS: Yes as above  MENTAL HEALTH SYMPTOMS: Yes  Hot flashes 10-15 times per day.  She has some incontinence  She has some knee issues. She gets injection.       Physical Exam:     BP (!) 142/89   Pulse 83   Resp 16   Wt 78.9 kg (174 lb)   SpO2 95%    BMI 29.87 kg/m     Physical Exam:   General: NAD  Head: Normocephalic, atraumatic  Eyes: No conjunctival injection, no scleral icterus.  Mouth: No oral lesions, no erythema or exudate in the oropharynx  Respiratory: Some dyspnea.    Neurologic:   Mental Status Exam: Alert, awake and easily engaged in interaction.   Cranial Nerves: PERRLA, EOMs intact, no nystagmus, mild facial weakness, facial movements symmetric,                 facial sensation intact to light touch, hearing intact to conversation, palate and uvula               rise symmetrically, no deviation in uvula or tongue, tongue midline and fully mobile                with no atrophy or fasciculations.    Motor: Normal tone in all four extremities, no atrophy or fasciculations.             Manual Motor Exam     Right Left A F  Right Left A F   Shoulder Abduction 4 4   Hip Flexion 4 4     Elbow Flexion 5 5   Knee Extension 5 5     Elbow Extension 5 5   Knee Flexion 5 5     Wrist Extension 5 5   Foot Dorsiflexion 5 5     Wrist flexion 5 5   Foot Plantar Flexion 5 5                                                                                                                                                        Reflexes   Right Left  Right Left   Biceps 2 2 Patellar 2 2   Triceps 2 2 Achilles 2 2   Brachioradialis 2 2 Babinski Absent Absent       Coordination and Gait  Gait 0 Normal   Right Left   Romberg 3 Not tested  Heel 2 Not tested 2 Not tested   Tandem 2 Not tested  Toe 2 Not tested 2 Not tested      Coordination: No overt dysmetria seen.    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  Improved on treatment with Soliris initially but now seems to be with some worsening mainly in her fatigue. She has mild restrictive lung disease but is not able to  use her BiPAP which is very helpful. 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 Later however is minor and has minimal symptomatic involvement.   Depression seems to be slightly worse.     Recommendations:  -Prednisone reduce to 7.5 mg daily  -Increase Celexa to 30 mg, she will contact if depression is worse.   -BiPAP humidifier replacement.  -Continue Soliris  -handicapped parking form signed.  -pulmonary medicine follow up with Dr. Marinelli.  -RTC 6 months    I have spent  45 min spent on the date of the encounter in chart review, patient visit, review of tests, counseling the patient, documentation and/or discussion with other providers about the issues documented above. See note for details.    Sincerely,        Frederick Vitale MD  Pediatric Neurology  [phone]         CC  Patient Care Team:  Taylor Osborn as PCP - General (Internal Medicine)  Frederick Vitale MD as MD (Neurology)  Sean Torres MD as MD (Cardiology)  Charito Marinelli MD as MD (Internal Medicine)  Frederick Vitale MD as Assigned Neuroscience Provider  Sean Torres MD as Assigned Heart and Vascular Provider  Doug Rooney MD as Assigned Surgical Provider  SELF, REFERRED    Copy to patient  IVY PETERSON  [address] [place] [id number]

## 2021-02-22 NOTE — NURSING NOTE
Chief Complaint   Patient presents with     Myasthenia Gravis     [facility] RETURN MYASTHENIA GRAVIS F/U        Zaki Paulino, EMT

## 2021-02-23 LAB
EXPTIME-PRE: 5.88 SEC
FEF2575-%PRED-POST: 115 %
FEF2575-%PRED-PRE: 94 %
FEF2575-POST: 2.3 L/SEC
FEF2575-PRE: 1.88 L/SEC
FEF2575-PRED: 1.98 L/SEC
FEFMAX-%PRED-PRE: 80 %
FEFMAX-PRE: 4.7 L/SEC
FEFMAX-PRED: 5.86 L/SEC
FEV1-%PRED-PRE: 64 %
FEV1-PRE: 1.48 L
FEV1FEV6-PRE: 87 %
FEV1FEV6-PRED: 80 %
FEV1FVC-PRE: 88 %
FEV1FVC-PRED: 78 %
FIFMAX-PRE: 3.8 L/SEC
FVC-%PRED-PRE: 57 %
FVC-PRE: 1.68 L
FVC-PRED: 2.95 L
MEP-PRE: 66 CMH2O
MIP-PRE: -51 CMH2O

## 2021-02-24 NOTE — TELEPHONE ENCOUNTER
"[facility] Call Center    Phone Message    May a detailed message be left on voicemail: yes     Reason for Call: Other: Christiane from [facility] (ph# [phone], fax [phone]) is needing  a call back from  Kim yanes. pt is coming in  for infusion on Mon.3/01/21.    Christiane received new orders, as if pt is a \"brand new start\" which pt is not. Please have Kim call Christiane yanes. Thank you.    Action Taken: Message routed to:  [facility] ([facility]    Travel Screening: Not Applicable                                                                      "

## 2021-02-26 NOTE — TELEPHONE ENCOUNTER
Updated therapy orders faxed to [facility] (phone [phone], fax [phone]).    Kim Bryant RN

## 2021-04-03 ENCOUNTER — HEALTH MAINTENANCE LETTER (OUTPATIENT)
Age: 66
End: 2021-04-03

## 2021-04-13 NOTE — PROCEDURES
Interventional Radiology Brief Post Procedure Note    Procedure:  IR CVC TUNNEL PLACEMENT > 5 YRS OF AGE [[id number]]    Proceduralist: Maykel Montes PA-C    Assistant: None    Time Out: Prior to the start of the procedure and with procedural staff participation, I verbally confirmed the patient s identity using two indicators, relevant allergies, that the procedure was appropriate and matched the consent or emergent situation, and that the correct equipment/implants were available. Immediately prior to starting the procedure I conducted the Time Out with the procedural staff and re-confirmed the patient s name, procedure, and site/side. (The Joint Commission universal protocol was followed.)  Yes    Sedation: None. Local Anesthestic used    Findings: Completed image-guided placement of 14.5 Chinese, 19 cm double lumen tunneled central venous catheter via right IJ. Aspirates and flushes freely, heparin locked and ready for immediate use. Tip in high right atrium.    Estimated Blood Loss: Minimal    Fluoroscopy Time:  0.3 minute(s)    SPECIMENS: None    Complications: 1. None     Condition: Stable    Plan: Follow up per primary team. Return for removal as indicated.    Comments: See dictated procedure note for full details.    Maykel Montes PA-C     Heart Failure/Apixaban/Eliquis

## 2021-05-20 ENCOUNTER — OFFICE VISIT (OUTPATIENT)
Dept: PULMONOLOGY | Facility: CLINIC | Age: 66
End: 2021-05-20
Payer: MEDICARE

## 2021-05-20 VITALS
RESPIRATION RATE: 16 BRPM | DIASTOLIC BLOOD PRESSURE: 81 MMHG | SYSTOLIC BLOOD PRESSURE: 139 MMHG | BODY MASS INDEX: 30.04 KG/M2 | HEART RATE: 79 BPM | WEIGHT: 175 LBS | OXYGEN SATURATION: 93 %

## 2021-05-20 DIAGNOSIS — J45.20 MILD INTERMITTENT ASTHMA WITHOUT COMPLICATION: Primary | ICD-10-CM

## 2021-05-20 DIAGNOSIS — G70.00 MYASTHENIA GRAVIS WITHOUT EXACERBATION (H): ICD-10-CM

## 2021-05-20 PROCEDURE — 99214 OFFICE O/P EST MOD 30 MIN: CPT

## 2021-05-20 RX ORDER — ALBUTEROL SULFATE 90 UG/1
2 AEROSOL, METERED RESPIRATORY (INHALATION) EVERY 4 HOURS PRN
Qty: 8 G | Refills: 11 | Status: SHIPPED | OUTPATIENT
Start: 2021-05-20

## 2021-05-20 ASSESSMENT — PAIN SCALES - GENERAL: PAINLEVEL: NO PAIN (0)

## 2021-05-20 NOTE — PROGRESS NOTES
Pulmonary outpatient visit    May 20, 2021    S: Martina Kim is being seen today for followup of restrictive lung disease in the setting of Myasthenia Gravis. I last saw her in October 2016 - at that time she was on BiPAP and being followed in the sleep clinic by Dr. Wade. About a year ago, her machine broke and she has not been able to use it. She has moved to [facility] - [employer] is her [employer] company. A new prescription has been sent, but she was told that she would need a new sleep study to qualify.    She sleeps poorly at night and feels tired during the day. She has generalized weakness that she attributes to MG - currently on Soliris and prednisone (5 mg alternating with 7.5 mg/day). She plans to contact Dr. Vitale to discuss possible other options for treatment. She was on Hizentra in the past, which worked well, but Medicare would no longer pay for it.    She has modest PAUL during the day. Occasional wheezing. In the past, she was felt to have a component of asthma contributing to her dyspnea, and she was on PRN albuterol. She also tried Advair, but this led to tongue swelling so was discontinued. She has been vaccinated for COVID    Minimal intermittent LE edema. Complete ROS negative.      Current Outpatient Medications   Medication Sig Dispense Refill     amLODIPine (NORVASC) 5 MG tablet Take 1 tablet (5 mg) by mouth daily 90 tablet 0     Ascorbic Acid (VITAMIN C PO) Take 1,000 mg by mouth daily       aspirin 81 MG tablet Take  by mouth daily. 1 Tablet Daily       bimatoprost (LUMIGAN) 0.03 % ophthalmic solution Place 1 drop into both eyes daily       calcium carbonate (OS-LINDEN 500 MG [place]. CA) 500 MG tablet Take 500 mg by mouth 2 times daily       Cholecalciferol (VITAMIN D PO) Take 5,000 Units by mouth daily 1 Tablet Daily       citalopram (CELEXA) 20 MG tablet Take 1.5 tablets (30 mg) by mouth daily 45 tablet 3     eculizumab (SOLIRIS) 300 MG/30ML SOLN injection        fluorometholone  (FML LIQUIFILM) 0.1 % ophthalmic susp Apply 1 drop to eye 3 times daily  1     LANsoprazole (PREVACID) 30 MG capsule Take 30 mg by mouth daily 1 Tablet Daily       lisinopril (PRINIVIL/ZESTRIL) 20 MG tablet Take 20 mg by mouth       melatonin 5 MG CAPS Take 20 mg by mouth At Bedtime       Thiamine HCl (VITAMIN  B-1) 50 MG tablet Take 50 mg by mouth daily       VITAMIN A PO Take 8,000 Units by mouth daily 1 Tablet Daily       VITAMIN E 400 Units daily 1 Tablet Daily         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 Mother      Alzheimer Disease Father        O: Alert, appears comfortable without resp acc muscle use  VS: /81   Pulse 79   Resp 16   Wt 79.4 kg (175 lb)   SpO2 93%   BMI 30.04 kg/m    HEENT: NC/AT, no icterus  Neck: No CHARLENE  Lungs: CTA bilaterally, no wheezes or crackles  Cor: RRR S1S2, +2/6 holosystolic murmur  Extr: No clubbing/cyanosis/edema  Neuro: Alert, speech fluent, no facial droop    Spirometry was performed 2/22/21 and is personally reviewed: FEV1 was 1.68 L or 57% predicted. FVC was 1.48 L or 64 % predicted. MIP was reduced at -51. Compared with last spirometry dated 12/2019, this result is unchanged      A/P:     1) MG with restrictive lung disease and probable nocturnal hypoventilation  - She did have PSG done in April 2016  - Was on BiPAP until about a year ago  - She qualifies for NIV based on her neuromuscular diagnosis and MIP of -51, shouldn't need another PSG  - We will make sure [place] has an accurate prescription - she's not sure if she needs a new machine or if hers can be fixed.    2) Dyspnea: Likely multifactorial  - Starting nocturnal NIV again. I'd like to get overnight oximetry done on NIV  - Will also restart PRN albuterol  - She will work with Dr. Vitale to optimize MG treatment  - Will consider further evaluation for this depending on response to  treatment        Charito Marinelli MD    Dept of Pulmonary, Sleep and Critical Care Medicine  Pager [phone]

## 2021-05-20 NOTE — LETTER
5/20/2021       RE: Martina Kim  [address] [place] [address]     Dear Colleague,    Thank you for referring your patient, Martina Kim, to the [facility] [place] at [facility]. Please see a copy of my visit note below.    Pulmonary outpatient visit    May 20, 2021    S: Martina Kim is being seen today for followup of restrictive lung disease in the setting of Myasthenia Gravis. I last saw her in October 2016 - at that time she was on BiPAP and being followed in the sleep clinic by Dr. Wade. About a year ago, her machine broke and she has not been able to use it. She has moved to [facility], [facility] is her [employer] company. A new prescription has been sent, but she was told that she would need a new sleep study to qualify.    She sleeps poorly at night and feels tired during the day. She has generalized weakness that she attributes to MG - currently on Soliris and prednisone (5 mg alternating with 7.5 mg/day). She plans to contact Dr. Vitale to discuss possible other options for treatment. She was on Hizentra in the past, which worked well, but Medicare would no longer pay for it.    She has modest PAUL during the day. Occasional wheezing. In the past, she was felt to have a component of asthma contributing to her dyspnea, and she was on PRN albuterol. She also tried Advair, but this led to tongue swelling so was discontinued. She has been vaccinated for COVID    Minimal intermittent LE edema. Complete ROS negative.      Current Outpatient Medications   Medication Sig Dispense Refill     amLODIPine (NORVASC) 5 MG tablet Take 1 tablet (5 mg) by mouth daily 90 tablet 0     Ascorbic Acid (VITAMIN C PO) Take 1,000 mg by mouth daily       aspirin 81 MG tablet Take  by mouth daily. 1 Tablet Daily       bimatoprost (LUMIGAN) 0.03 % ophthalmic solution Place 1 drop into both eyes daily       calcium  carbonate (OS-LINDEN 500 MG [place]. [place]) 500 MG tablet Take 500 mg by mouth 2 times daily       Cholecalciferol (VITAMIN D PO) Take 5,000 Units by mouth daily 1 Tablet Daily       citalopram (CELEXA) 20 MG tablet Take 1.5 tablets (30 mg) by mouth daily 45 tablet 3     eculizumab (SOLIRIS) 300 MG/30ML SOLN injection        fluorometholone (FML LIQUIFILM) 0.1 % ophthalmic susp Apply 1 drop to eye 3 times daily  1     LANsoprazole (PREVACID) 30 MG capsule Take 30 mg by mouth daily 1 Tablet Daily       lisinopril (PRINIVIL/ZESTRIL) 20 MG tablet Take 20 mg by mouth       melatonin 5 MG CAPS Take 20 mg by mouth At Bedtime       Thiamine HCl (VITAMIN  B-1) 50 MG tablet Take 50 mg by mouth daily       VITAMIN A PO Take 8,000 Units by mouth daily 1 Tablet Daily       VITAMIN E 400 Units daily 1 Tablet Daily         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 Mother      Alzheimer Disease Father        O: Alert, appears comfortable without resp acc muscle use  VS: /81   Pulse 79   Resp 16   Wt 79.4 kg (175 lb)   SpO2 93%   BMI 30.04 kg/m    HEENT: NC/AT, no icterus  Neck: No CHARLENE  Lungs: CTA bilaterally, no wheezes or crackles  Cor: RRR S1S2, +2/6 holosystolic murmur  Extr: No clubbing/cyanosis/edema  Neuro: Alert, speech fluent, no facial droop    Spirometry was performed 2/22/21 and is personally reviewed: FEV1 was 1.68 L or 57% predicted. FVC was 1.48 L or 64 % predicted. MIP was reduced at -51. Compared with last spirometry dated 12/2019, this result is unchanged      A/P:     1) MG with restrictive lung disease and probable nocturnal hypoventilation  - She did have PSG done in April 2016  - Was on BiPAP until about a year ago  - She qualifies for NIV based on her neuromuscular diagnosis and MIP of -51, shouldn't need another PSG  - We will make sure [facility] has an accurate prescription - she's not sure if  she needs a new machine or if hers can be fixed.    2) Dyspnea: Likely multifactorial  - Starting nocturnal NIV again. I'd like to get overnight oximetry done on NIV  - Will also restart PRN albuterol  - She will work with Dr. Vitale to optimize MG treatment  - Will consider further evaluation for this depending on response to treatment    Charito Marinelli MD    Dept of Pulmonary, Sleep and Critical Care Medicine  Pager [phone]

## 2021-05-20 NOTE — PATIENT INSTRUCTIONS
We will get the BiPAP prescription to [place]; you definitely qualify for it without another sleep study    I sent a prescription for Albuterol to your pharmacy; use this as needed    Discuss MG treatment with Dr. Vitale

## 2021-06-08 ENCOUNTER — MYC MEDICAL ADVICE (OUTPATIENT)
Dept: NEUROLOGY | Facility: CLINIC | Age: 66
End: 2021-06-08

## 2021-06-08 DIAGNOSIS — G70.00 MYASTHENIA GRAVIS WITHOUT EXACERBATION (H): Primary | ICD-10-CM

## 2021-06-10 RX ORDER — PREDNISONE 5 MG/1
7.5 TABLET ORAL DAILY
Qty: 30 TABLET | Refills: 5 | Status: SHIPPED | OUTPATIENT
Start: 2021-06-10

## 2021-06-10 RX ORDER — PREDNISONE 20 MG/1
TABLET ORAL
Qty: 6 TABLET | Refills: 5 | Status: SHIPPED | OUTPATIENT
Start: 2021-06-10

## 2021-07-15 DIAGNOSIS — G70.00 MYASTHENIA GRAVIS WITHOUT EXACERBATION (H): Primary | ICD-10-CM

## 2021-07-16 RX ORDER — CYCLOBENZAPRINE HCL 10 MG
TABLET ORAL
Qty: 180 TABLET | Refills: 1 | Status: SHIPPED | OUTPATIENT
Start: 2021-07-16

## 2021-07-20 ENCOUNTER — TELEPHONE (OUTPATIENT)
Dept: NEUROLOGY | Facility: CLINIC | Age: 66
End: 2021-07-20

## 2021-07-20 NOTE — TELEPHONE ENCOUNTER
Prior Authorization Retail Medication Request    Medication/Dose: cyclobenzaprine 10 mg  ICD code (if different than what is on RX):  M62.83 Muscle Spasm  Previously Tried and Failed:    Rationale:      Insurance Name:    Insurance ID:        Pharmacy Information (if different than what is on RX)  Name:    Phone:

## 2021-07-20 NOTE — TELEPHONE ENCOUNTER
Central Prior Authorization Team   [phone]    PA Initiation    Medication: cyclobenzaprine (FLEXERIL) 10 MG tablet   Insurance Company: [employer] - Phone [phone] Fax [phone]  Pharmacy Filling the Rx: [facility] [address] [place] [address]  Filling Pharmacy Phone: [phone]  Filling Pharmacy Fax: [phone]  Start Date: 7/20/2021

## 2021-07-20 NOTE — TELEPHONE ENCOUNTER
Prior Authorization Approval        Authorization Effective Date: 6/20/2021  Authorization Expiration Date: 7/20/2022  Medication: cyclobenzaprine (FLEXERIL) 10 MG tablet-PA APPROVED   Approved Dose/Quantity:   Reference #:     Insurance Company: [employer] - Phone [phone] Fax [phone]  Expected CoPay:       CoPay Card Available:      Foundation Assistance Needed:    Which Pharmacy is filling the prescription (Not needed for infusion/clinic administered): [facility] [address] [place] [address]  Pharmacy Notified: Yes- **Instructed pharmacy to notify patient when script is ready to /ship.**   Patient Notified: Yes

## 2021-08-08 DIAGNOSIS — F32.0 MILD MAJOR DEPRESSION (H): ICD-10-CM

## 2021-08-09 RX ORDER — CITALOPRAM HYDROBROMIDE 20 MG/1
TABLET ORAL
Qty: 45 TABLET | Refills: 2 | Status: SHIPPED | OUTPATIENT
Start: 2021-08-09

## 2021-09-03 ENCOUNTER — TRANSFERRED RECORDS (OUTPATIENT)
Dept: HEALTH INFORMATION MANAGEMENT | Facility: CLINIC | Age: 66
End: 2021-09-03

## 2021-09-12 ENCOUNTER — HEALTH MAINTENANCE LETTER (OUTPATIENT)
Age: 66
End: 2021-09-12

## 2021-10-20 NOTE — PROGRESS NOTES
"Pulmonary outpatient visit    October 20, 2021    S: Martina Kim is being seen today for followup of MG with restrictive lung disease, as well as asthma. I last saw her in May - at that time, we sent a new prescription to [place] for replacement BiPAP machine. She got her new machine in June.    She had unfortunately been more dyspneic recently - starting in about Sept. She had CXR done 9/2 at [facility] - no acute findings. She has had some minor chest tightness that doesn't seem exertional. She twisted and felt a \"pop\", subsequently has had increased pleuritic pain in her right lowe chest. She thinks albuterol helps with her dyspnea - doesn't have a controller med. Prior to getting her new \"Resmed\" machine, she was using a Dreamstation. 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 She has registered her machine, etc, with [employer].    She is tolerating her new BiPAP machine quite well. Initial pressure of 14 cwp seemed too high, and it has been reduced to 11 cwp (see below). She feels the air is a bit dry - hasn't tried adjusting herself (but will). She had some bilateral LE edema that was associated with taking a med for her urinary incontinence - this has resolved since she stopped that med.    She is changing her Neurology care to [place], as it is closer to her home. She wonders if there is pulmonary availability there as well.    Current Outpatient Medications   Medication Sig Dispense Refill     albuterol (PROAIR HFA/PROVENTIL HFA/VENTOLIN HFA) 108 (90 Base) MCG/ACT inhaler Inhale 2 puffs into the lungs every 4 hours as needed 8 g 11     amLODIPine (NORVASC) 5 MG tablet Take 1 tablet (5 mg) by mouth daily 90 tablet 0     Ascorbic Acid (VITAMIN C PO) Take 1,000 mg by mouth daily       aspirin 81 MG tablet Take  by mouth daily. 1 Tablet " Daily       bimatoprost (LUMIGAN) 0.03 % ophthalmic solution Place 1 drop into both eyes daily       calcium carbonate (OS-LINDEN 500 MG [place]. [place]) 500 MG tablet Take 500 mg by mouth 2 times daily       Cholecalciferol (VITAMIN D PO) Take 5,000 Units by mouth daily 1 Tablet Daily       citalopram (CELEXA) 20 MG tablet TAKE ONE AND ONE-HALF TABLETS BY MOUTH EVERY DAY 45 tablet 2     cyclobenzaprine (FLEXERIL) 10 MG tablet TAKE 1 TABLET BY MOUTH TWICE DAILY AS NEEDED 180 tablet 1     diphenhydrAMINE (BENADRYL) 50 MG capsule Take 50 mg by mouth every 6 hours as needed       doxycycline hyclate (VIBRAMYCIN) 50 MG capsule Take 100 mg by mouth       eculizumab (SOLIRIS) 300 MG/30ML SOLN injection        fluorometholone (FML LIQUIFILM) 0.1 % ophthalmic susp Apply 1 drop to eye 3 times daily  1     HEALON 10 MG/ML SOLN intra-ocular inj Apply 1 Application topically 4 times daily  3     IBUPROFEN PO Take 800 mg by mouth every 8 hours as needed        LANsoprazole (PREVACID) 30 MG capsule Take 30 mg by mouth daily 1 Tablet Daily       lisinopril (PRINIVIL/ZESTRIL) 20 MG tablet Take 20 mg by mouth       melatonin 5 MG CAPS Take 20 mg by mouth At Bedtime       predniSONE (DELTASONE) 20 MG tablet Take 1  Tab (20 mg) daily the day before, day on and day after infusion of Soliris every other week. 6 tablet 5     predniSONE (DELTASONE) 5 MG tablet Take 1.5 tablets (7.5 mg) by mouth daily 30 tablet 5     Thiamine HCl (VITAMIN  B-1) 50 MG tablet Take 50 mg by mouth daily       VITAMIN A PO Take 8,000 Units by mouth daily 1 Tablet Daily       VITAMIN E 400 Units daily 1 Tablet Daily         Social Hx:  Retired malpractice (defense)     Family Hx:   Family History   Problem Relation Age of Onset     C.A.D. Mother      Alzheimer Disease Father        O: Alert, appears comfortable without resp acc muscle use  VS: BP (!) 143/90   Pulse 88   Resp 16   Wt 80.3 kg (177 lb 1.6 oz)   SpO2 96%   BMI 30.40 kg/m    HEENT: NC/AT, no  icterus. OP clear  Neck: No CHARLENE  Lungs: CTA bilaterally, no wheezes or crackles  Cor: RRR S1S2, +2/6 CHEMO throughout  Extr: No clubbing/cyanosis/edema  Neuro: Alert, speech fluent, no facial droop    BiPAP download:  Initial compliance period 6/9-7/8/21. 14/8 cwp, RR 6/min. Compliance >4h 100%, with avg use 8h 29m. Delivered  ml, residual AHI 2.9/hr    Most recent 9/21-10/20/21. 11/6 cwp, RR 6/min. Compliance >4h 73%, with avg use 6h 7m. Delivered TV avg 345 ml, residual AHI 1.7/hr    A/P:     1) Myasthenia gravis with restrictive lung disease  - PSG April 2016  - NIV restarted 6/2021, and she is doing well with this  - No changes recommended in this regard    2) Asthma/dyspnea:   - No wheezing on exam today  - Will repeat PFTs (last Feb 2021), and I'll call her with results  - She'll try ICS - Rx sent to her pharmacy  - Depending on response to ICS, she'll consider CT pulm angio (didn't want today due to radiation exposure and IV contrast dye risk) and/or cardiac workup  - I'll make sure she's entered into the Dream[employer] registry. Not clear whether this is playing a role, and it wouldn't change our management at this time    No RTC scheduled at this time; I'd be happy to see her again in the future if desired      Charito Marinelli MD    Dept of Pulmonary, Sleep and Critical Care Medicine  Pager [phone]

## 2021-10-21 ENCOUNTER — OFFICE VISIT (OUTPATIENT)
Dept: PULMONOLOGY | Facility: CLINIC | Age: 66
End: 2021-10-21
Payer: MEDICARE

## 2021-10-21 VITALS
WEIGHT: 177.1 LBS | RESPIRATION RATE: 16 BRPM | SYSTOLIC BLOOD PRESSURE: 143 MMHG | BODY MASS INDEX: 30.4 KG/M2 | DIASTOLIC BLOOD PRESSURE: 90 MMHG | HEART RATE: 88 BPM | OXYGEN SATURATION: 96 %

## 2021-10-21 DIAGNOSIS — J45.22 INTERMITTENT ASTHMA WITH STATUS ASTHMATICUS, UNSPECIFIED ASTHMA SEVERITY: Primary | ICD-10-CM

## 2021-10-21 DIAGNOSIS — J45.22 INTERMITTENT ASTHMA WITH STATUS ASTHMATICUS, UNSPECIFIED ASTHMA SEVERITY: ICD-10-CM

## 2021-10-21 LAB
EXPTIME-PRE: 5.26 SEC
FEF2575-%PRED-PRE: 124 %
FEF2575-PRE: 2.42 L/SEC
FEF2575-PRED: 1.95 L/SEC
FEFMAX-%PRED-PRE: 70 %
FEFMAX-PRE: 4.07 L/SEC
FEFMAX-PRED: 5.79 L/SEC
FEV1-%PRED-PRE: 64 %
FEV1-PRE: 1.47 L
FEV1FEV6-PRE: 91 %
FEV1FEV6-PRED: 79 %
FEV1FVC-PRE: 91 %
FEV1FVC-PRED: 78 %
FIFMAX-PRE: 4.44 L/SEC
FVC-%PRED-PRE: 55 %
FVC-PRE: 1.61 L
FVC-PRED: 2.91 L
MEP-PRE: 70 CMH2O
MIP-PRE: -57 CMH2O

## 2021-10-21 PROCEDURE — 99214 OFFICE O/P EST MOD 30 MIN: CPT | Mod: 25

## 2021-10-21 PROCEDURE — 94375 RESPIRATORY FLOW VOLUME LOOP: CPT | Performed by: INTERNAL MEDICINE

## 2021-10-21 RX ORDER — INHALER, ASSIST DEVICES
1 SPACER (EA) MISCELLANEOUS 2 TIMES DAILY
Qty: 1 EACH | Refills: 1 | Status: SHIPPED | OUTPATIENT
Start: 2021-10-21

## 2021-10-21 RX ORDER — BUDESONIDE AND FORMOTEROL FUMARATE DIHYDRATE 160; 4.5 UG/1; UG/1
2 AEROSOL RESPIRATORY (INHALATION) 2 TIMES DAILY
Qty: 10.2 G | Refills: 11 | Status: SHIPPED | OUTPATIENT
Start: 2021-10-21

## 2021-10-21 ASSESSMENT — PAIN SCALES - GENERAL: PAINLEVEL: MODERATE PAIN (4)

## 2021-10-21 NOTE — PATIENT INSTRUCTIONS
Bill Edmondson is a good pulmonary doctor in [place]    We'll get pulmonary function tests, and I'll call you with results    Try Symbicort - to see if this helps your breathing and allows you to use less Albuterol    If the Symbicort doesn't work, consider CT pulmonary angiogram (for pulmonary embolism) and/or cardiac workup

## 2021-10-21 NOTE — LETTER
"10/21/2021       RE: Martina Kim  [address] [place] [address]     Dear Colleague,    Thank you for referring your patient, Martina Kim, to the [facility] [place] at [facility]. Please see a copy of my visit note below.    Pulmonary outpatient visit    October 20, 2021    S: Martina Kim is being seen today for followup of MG with restrictive lung disease, as well as asthma. I last saw her in May - at that time, we sent a new prescription to [facility] for replacement BiPAP machine. She got her new machine in June.    She had unfortunately been more dyspneic recently - starting in about Sept. She had CXR done 9/2 at [facility] - no acute findings. She has had some minor chest tightness that doesn't seem exertional. She twisted and felt a \"pop\", subsequently has had increased pleuritic pain in her right lowe chest. She thinks albuterol helps with her dyspnea - doesn't have a controller med. Prior to getting her new \"Resmed\" machine, she was using a Dreamstation. 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 She has registered her machine, etc, with [employer].    She is tolerating her new BiPAP machine quite well. Initial pressure of 14 cwp seemed too high, and it has been reduced to 11 cwp (see below). She feels the air is a bit dry - hasn't tried adjusting herself (but will). She had some bilateral LE edema that was associated with taking a med for her urinary incontinence - this has resolved since she stopped that med.    She is changing her Neurology care to [place], as it is closer to her home. She wonders if there is pulmonary availability there as well.    Current Outpatient Medications   Medication Sig Dispense Refill     albuterol (PROAIR " HFA/PROVENTIL HFA/VENTOLIN HFA) 108 (90 Base) MCG/ACT inhaler Inhale 2 puffs into the lungs every 4 hours as needed 8 g 11     amLODIPine (NORVASC) 5 MG tablet Take 1 tablet (5 mg) by mouth daily 90 tablet 0     Ascorbic Acid (VITAMIN C PO) Take 1,000 mg by mouth daily       aspirin 81 MG tablet Take  by mouth daily. 1 Tablet Daily       bimatoprost (LUMIGAN) 0.03 % ophthalmic solution Place 1 drop into both eyes daily       calcium carbonate (OS-LINDEN 500 MG [place]. [place]) 500 MG tablet Take 500 mg by mouth 2 times daily       Cholecalciferol (VITAMIN D PO) Take 5,000 Units by mouth daily 1 Tablet Daily       citalopram (CELEXA) 20 MG tablet TAKE ONE AND ONE-HALF TABLETS BY MOUTH EVERY DAY 45 tablet 2     cyclobenzaprine (FLEXERIL) 10 MG tablet TAKE 1 TABLET BY MOUTH TWICE DAILY AS NEEDED 180 tablet 1     diphenhydrAMINE (BENADRYL) 50 MG capsule Take 50 mg by mouth every 6 hours as needed       doxycycline hyclate (VIBRAMYCIN) 50 MG capsule Take 100 mg by mouth       eculizumab (SOLIRIS) 300 MG/30ML SOLN injection        fluorometholone (FML LIQUIFILM) 0.1 % ophthalmic susp Apply 1 drop to eye 3 times daily  1     HEALON 10 MG/ML SOLN intra-ocular inj Apply 1 Application topically 4 times daily  3     IBUPROFEN PO Take 800 mg by mouth every 8 hours as needed        LANsoprazole (PREVACID) 30 MG capsule Take 30 mg by mouth daily 1 Tablet Daily       lisinopril (PRINIVIL/ZESTRIL) 20 MG tablet Take 20 mg by mouth       melatonin 5 MG CAPS Take 20 mg by mouth At Bedtime       predniSONE (DELTASONE) 20 MG tablet Take 1  Tab (20 mg) daily the day before, day on and day after infusion of Soliris every other week. 6 tablet 5     predniSONE (DELTASONE) 5 MG tablet Take 1.5 tablets (7.5 mg) by mouth daily 30 tablet 5     Thiamine HCl (VITAMIN  B-1) 50 MG tablet Take 50 mg by mouth daily       VITAMIN A PO Take 8,000 Units by mouth daily 1 Tablet Daily       VITAMIN E 400 Units daily 1 Tablet Daily         Social Hx:  Retired  malpractice (defense)     Family Hx:   Family History   Problem Relation Age of Onset     C.A.D. Mother      Alzheimer Disease Father        O: Alert, appears comfortable without resp acc muscle use  VS: BP (!) 143/90   Pulse 88   Resp 16   Wt 80.3 kg (177 lb 1.6 oz)   SpO2 96%   BMI 30.40 kg/m    HEENT: NC/AT, no icterus. OP clear  Neck: No CHARLENE  Lungs: CTA bilaterally, no wheezes or crackles  Cor: RRR S1S2, +2/6 CHEMO throughout  Extr: No clubbing/cyanosis/edema  Neuro: Alert, speech fluent, no facial droop    BiPAP download:  Initial compliance period 6/9-7/8/21. 14/8 cwp, RR 6/min. Compliance >4h 100%, with avg use 8h 29m. Delivered  ml, residual AHI 2.9/hr    Most recent 9/21-10/20/21. 11/6 cwp, RR 6/min. Compliance >4h 73%, with avg use 6h 7m. Delivered TV avg 345 ml, residual AHI 1.7/hr    A/P:     1) Myasthenia gravis with restrictive lung disease  - PSG April 2016  - NIV restarted 6/2021, and she is doing well with this  - No changes recommended in this regard    2) Asthma/dyspnea:   - No wheezing on exam today  - Will repeat PFTs (last Feb 2021), and I'll call her with results  - She'll try ICS - Rx sent to her pharmacy  - Depending on response to ICS, she'll consider CT pulm angio (didn't want today due to radiation exposure and IV contrast dye risk) and/or cardiac workup  - I'll make sure she's entered into the [employer] registry. Not clear whether this is playing a role, and it wouldn't change our management at this time    No RTC scheduled at this time; I'd be happy to see her again in the future if desired      Charito Marinelli MD    Dept of Pulmonary, Sleep and Critical Care Medicine  Pager [phone]        Again, thank you for allowing me to participate in the care of your patient.      Sincerely,    Charito Marinelli MD

## 2021-11-26 ENCOUNTER — MYC MEDICAL ADVICE (OUTPATIENT)
Dept: NEUROLOGY | Facility: CLINIC | Age: 66
End: 2021-11-26
Payer: MEDICARE

## 2021-12-10 DIAGNOSIS — F32.0 MILD MAJOR DEPRESSION (H): ICD-10-CM

## 2021-12-10 RX ORDER — CITALOPRAM HYDROBROMIDE 20 MG/1
TABLET ORAL
Qty: 45 TABLET | Refills: 2 | OUTPATIENT
Start: 2021-12-10

## 2022-04-24 ENCOUNTER — HEALTH MAINTENANCE LETTER (OUTPATIENT)
Age: 67
End: 2022-04-24

## 2022-11-19 ENCOUNTER — HEALTH MAINTENANCE LETTER (OUTPATIENT)
Age: 67
End: 2022-11-19

## 2023-06-01 ENCOUNTER — HEALTH MAINTENANCE LETTER (OUTPATIENT)
Age: 68
End: 2023-06-01

## 2023-10-19 NOTE — TELEPHONE ENCOUNTER
Received email from Gemini Da Silva,  at [facility] that stated patient is having an acute exacerbation of her MG. They need clarification on her IVIG orders. Dr. Vitale, they are requesting a specific brand be noted. What would you like to use?    Estee Hopper, RN Care Coordinator     [phone]

## 2023-11-18 ENCOUNTER — HEALTH MAINTENANCE LETTER (OUTPATIENT)
Age: 68
End: 2023-11-18

## 2024-05-24 NOTE — Clinical Note
Hi Dr. Leyva,Patient does not need a new sleep study. She qualifies for a RAD under neuromuscular disease on the Medicare RAD chart, as she has myasthenia gravis.Thank you,Judith
24-May-2024

## 2024-06-15 ENCOUNTER — HEALTH MAINTENANCE LETTER (OUTPATIENT)
Age: 69
End: 2024-06-15

## (undated) DEVICE — GOWN XLG DISP 9545

## (undated) DEVICE — GLOVE PROTEXIS POWDER FREE SMT 7.5  2D72PT75X

## (undated) DEVICE — GLOVE PROTEXIS POWDER FREE SMT 7.0  2D72PT70X

## (undated) DEVICE — DECANTER BAG 2002S

## (undated) DEVICE — LINEN TOWEL PACK X5 5464

## (undated) RX ORDER — CALCIUM GLUCONATE 94 MG/ML
INJECTION, SOLUTION INTRAVENOUS
Status: DISPENSED
Start: 2017-12-30

## (undated) RX ORDER — HEPARIN SODIUM 1000 [USP'U]/ML
INJECTION, SOLUTION INTRAVENOUS; SUBCUTANEOUS
Status: DISPENSED
Start: 2017-12-30

## (undated) RX ORDER — GABAPENTIN 300 MG/1
CAPSULE ORAL
Status: DISPENSED
Start: 2017-12-20

## (undated) RX ORDER — ACETAMINOPHEN 325 MG/1
TABLET ORAL
Status: DISPENSED
Start: 2018-07-09

## (undated) RX ORDER — CALCIUM GLUCONATE 94 MG/ML
INJECTION, SOLUTION INTRAVENOUS
Status: DISPENSED
Start: 2017-12-21

## (undated) RX ORDER — HEPARIN SODIUM 1000 [USP'U]/ML
INJECTION, SOLUTION INTRAVENOUS; SUBCUTANEOUS
Status: DISPENSED
Start: 2017-12-23

## (undated) RX ORDER — CEFAZOLIN SODIUM 2 G/100ML
INJECTION, SOLUTION INTRAVENOUS
Status: DISPENSED
Start: 2018-07-09

## (undated) RX ORDER — ALBUMIN, HUMAN INJ 5% 5 %
SOLUTION INTRAVENOUS
Status: DISPENSED
Start: 2017-12-28

## (undated) RX ORDER — CALCIUM GLUCONATE 94 MG/ML
INJECTION, SOLUTION INTRAVENOUS
Status: DISPENSED
Start: 2018-07-16

## (undated) RX ORDER — ALBUMIN, HUMAN INJ 5% 5 %
SOLUTION INTRAVENOUS
Status: DISPENSED
Start: 2018-07-11

## (undated) RX ORDER — CEFAZOLIN SODIUM 1 G/3ML
INJECTION, POWDER, FOR SOLUTION INTRAMUSCULAR; INTRAVENOUS
Status: DISPENSED
Start: 2017-12-20

## (undated) RX ORDER — CALCIUM GLUCONATE 94 MG/ML
INJECTION, SOLUTION INTRAVENOUS
Status: DISPENSED
Start: 2017-12-23

## (undated) RX ORDER — ACETAMINOPHEN 325 MG/1
TABLET ORAL
Status: DISPENSED
Start: 2018-07-16

## (undated) RX ORDER — HEPARIN SODIUM,PORCINE 10 UNIT/ML
VIAL (ML) INTRAVENOUS
Status: DISPENSED
Start: 2018-07-09

## (undated) RX ORDER — CALCIUM GLUCONATE 94 MG/ML
INJECTION, SOLUTION INTRAVENOUS
Status: DISPENSED
Start: 2018-07-18

## (undated) RX ORDER — ALBUMIN, HUMAN INJ 5% 5 %
SOLUTION INTRAVENOUS
Status: DISPENSED
Start: 2018-07-16

## (undated) RX ORDER — HEPARIN SODIUM 1000 [USP'U]/ML
INJECTION, SOLUTION INTRAVENOUS; SUBCUTANEOUS
Status: DISPENSED
Start: 2018-07-09

## (undated) RX ORDER — CALCIUM GLUCONATE 94 MG/ML
INJECTION, SOLUTION INTRAVENOUS
Status: DISPENSED
Start: 2018-07-09

## (undated) RX ORDER — ACETAMINOPHEN 325 MG/1
TABLET ORAL
Status: DISPENSED
Start: 2017-12-20

## (undated) RX ORDER — ALBUMIN, HUMAN INJ 5% 5 %
SOLUTION INTRAVENOUS
Status: DISPENSED
Start: 2017-12-21

## (undated) RX ORDER — HEPARIN SODIUM 1000 [USP'U]/ML
INJECTION, SOLUTION INTRAVENOUS; SUBCUTANEOUS
Status: DISPENSED
Start: 2018-07-16

## (undated) RX ORDER — CALCIUM GLUCONATE 94 MG/ML
INJECTION, SOLUTION INTRAVENOUS
Status: DISPENSED
Start: 2017-12-26

## (undated) RX ORDER — SODIUM CHLORIDE 9 MG/ML
INJECTION, SOLUTION INTRAVENOUS
Status: DISPENSED
Start: 2018-07-09

## (undated) RX ORDER — ALBUMIN, HUMAN INJ 5% 5 %
SOLUTION INTRAVENOUS
Status: DISPENSED
Start: 2018-07-13

## (undated) RX ORDER — HEPARIN SODIUM (PORCINE) LOCK FLUSH IV SOLN 100 UNIT/ML 100 UNIT/ML
SOLUTION INTRAVENOUS
Status: DISPENSED
Start: 2018-07-09

## (undated) RX ORDER — HEPARIN SODIUM 1000 [USP'U]/ML
INJECTION, SOLUTION INTRAVENOUS; SUBCUTANEOUS
Status: DISPENSED
Start: 2017-12-21

## (undated) RX ORDER — HEPARIN SODIUM (PORCINE) LOCK FLUSH IV SOLN 100 UNIT/ML 100 UNIT/ML
SOLUTION INTRAVENOUS
Status: DISPENSED
Start: 2017-12-20

## (undated) RX ORDER — HEPARIN SODIUM 1000 [USP'U]/ML
INJECTION, SOLUTION INTRAVENOUS; SUBCUTANEOUS
Status: DISPENSED
Start: 2018-07-11

## (undated) RX ORDER — HEPARIN SODIUM 1000 [USP'U]/ML
INJECTION, SOLUTION INTRAVENOUS; SUBCUTANEOUS
Status: DISPENSED
Start: 2018-07-18

## (undated) RX ORDER — NITROGLYCERIN 0.4 MG/1
TABLET SUBLINGUAL
Status: DISPENSED
Start: 2019-12-20

## (undated) RX ORDER — CALCIUM GLUCONATE 94 MG/ML
INJECTION, SOLUTION INTRAVENOUS
Status: DISPENSED
Start: 2017-12-28

## (undated) RX ORDER — LIDOCAINE HYDROCHLORIDE 10 MG/ML
INJECTION, SOLUTION EPIDURAL; INFILTRATION; INTRACAUDAL; PERINEURAL
Status: DISPENSED
Start: 2018-01-04

## (undated) RX ORDER — CALCIUM GLUCONATE 94 MG/ML
INJECTION, SOLUTION INTRAVENOUS
Status: DISPENSED
Start: 2018-07-11

## (undated) RX ORDER — ALBUMIN, HUMAN INJ 5% 5 %
SOLUTION INTRAVENOUS
Status: DISPENSED
Start: 2018-07-18

## (undated) RX ORDER — LIDOCAINE HYDROCHLORIDE 10 MG/ML
INJECTION, SOLUTION EPIDURAL; INFILTRATION; INTRACAUDAL; PERINEURAL
Status: DISPENSED
Start: 2018-07-09

## (undated) RX ORDER — LIDOCAINE HYDROCHLORIDE 10 MG/ML
INJECTION, SOLUTION EPIDURAL; INFILTRATION; INTRACAUDAL; PERINEURAL
Status: DISPENSED
Start: 2018-07-23

## (undated) RX ORDER — CALCIUM GLUCONATE 94 MG/ML
INJECTION, SOLUTION INTRAVENOUS
Status: DISPENSED
Start: 2018-07-13

## (undated) RX ORDER — ALBUMIN, HUMAN INJ 5% 5 %
SOLUTION INTRAVENOUS
Status: DISPENSED
Start: 2017-12-26

## (undated) RX ORDER — ALBUMIN, HUMAN INJ 5% 5 %
SOLUTION INTRAVENOUS
Status: DISPENSED
Start: 2017-12-30

## (undated) RX ORDER — HEPARIN SODIUM 1000 [USP'U]/ML
INJECTION, SOLUTION INTRAVENOUS; SUBCUTANEOUS
Status: DISPENSED
Start: 2018-07-13

## (undated) RX ORDER — METOPROLOL TARTRATE 100 MG
TABLET ORAL
Status: DISPENSED
Start: 2019-12-20

## (undated) RX ORDER — HEPARIN SODIUM 1000 [USP'U]/ML
INJECTION, SOLUTION INTRAVENOUS; SUBCUTANEOUS
Status: DISPENSED
Start: 2017-12-28

## (undated) RX ORDER — ALBUMIN, HUMAN INJ 5% 5 %
SOLUTION INTRAVENOUS
Status: DISPENSED
Start: 2017-12-23

## (undated) RX ORDER — HEPARIN SODIUM 1000 [USP'U]/ML
INJECTION, SOLUTION INTRAVENOUS; SUBCUTANEOUS
Status: DISPENSED
Start: 2017-12-26

## (undated) RX ORDER — ACETAMINOPHEN 325 MG/1
TABLET ORAL
Status: DISPENSED
Start: 2018-07-18

## (undated) RX ORDER — ALBUMIN, HUMAN INJ 5% 5 %
SOLUTION INTRAVENOUS
Status: DISPENSED
Start: 2018-07-09